# Patient Record
Sex: FEMALE | NOT HISPANIC OR LATINO | Employment: OTHER | URBAN - METROPOLITAN AREA
[De-identification: names, ages, dates, MRNs, and addresses within clinical notes are randomized per-mention and may not be internally consistent; named-entity substitution may affect disease eponyms.]

---

## 2018-08-21 ENCOUNTER — TELEPHONE (OUTPATIENT)
Dept: GASTROENTEROLOGY | Facility: CLINIC | Age: 73
End: 2018-08-21

## 2018-08-21 NOTE — TELEPHONE ENCOUNTER
PREVISIT INFORMATION                                                    Bobby KAILA Ignacio scheduled for future visit at [facility] specialty clinics.    Patient is scheduled to see Dr Anderson on 8/24/18  Reason for visit: stomach pain/burning   Referring provider N/A  Has patient seen previous specialist? No  Medical Records:  records in care ereverywhere     REVIEW                                                      New patient packet mailed to patient: No  Medication reconciliation complete: No      Current Outpatient Prescriptions   Medication Sig Dispense Refill     atorvastatin (LIPITOR) 20 MG tablet Take 1 tablet (20 mg) by mouth daily 30 tablet 12     Bismuth Subsalicylate (PEPTO-BISMOL PO) Take by mouth every morning       Cholecalciferol (VITAMIN D) 2000 UNITS CAPS Take 2,000 Units by mouth daily       levothyroxine (SYNTHROID, LEVOTHROID) 112 MCG tablet Take 1 tablet by mouth daily. 100 tablet 3     omeprazole (PRILOSEC) 20 MG capsule Take 1 capsule (20 mg) by mouth daily 30 capsule 12     sertraline (ZOLOFT) 50 MG tablet Take 1 tablet by mouth daily. 100 tablet 3       Allergies: Aspirin-butalbital-caffeine [butalbital-aspirin-caffeine]        PLAN/FOLLOW-UP NEEDED                                                      Previsit review complete.  Patient will see provider at future scheduled appointment. Spoke with patient about consult date, time, and location of visit. Patient stated she will call back with . Patient is also due to see GI   Through the [employer] system on 8/22/18. Will call back if visit with Dr Anderson is needed    Patient Reminders Given:  Please, make sure you bring an updated list of your medications.   If you are having a procedure, please, present 15 minutes early.  If you need to cancel or reschedule,please call [phone].    Man CHOWDHURY

## 2018-08-21 NOTE — TELEPHONE ENCOUNTER
Spoke with patient and  about visit with Dr Anderson. Patient states they will keep visit with Dr Anderson as of now and will call if visit needs to be canceled after seeing GI through [facility]     Man CHOWDHURY

## 2018-12-05 ENCOUNTER — PATIENT OUTREACH (OUTPATIENT)
Dept: CARE COORDINATION | Facility: CLINIC | Age: 73
End: 2018-12-05

## 2019-12-20 ENCOUNTER — TRANSCRIBE ORDERS (OUTPATIENT)
Dept: OTHER | Age: 74
End: 2019-12-20

## 2019-12-20 DIAGNOSIS — M79.2 NEUROPATHIC PAIN OF LOWER EXTREMITY, UNSPECIFIED LATERALITY: Primary | ICD-10-CM

## 2020-01-02 ENCOUNTER — TELEPHONE (OUTPATIENT)
Dept: NEUROLOGY | Facility: CLINIC | Age: 75
End: 2020-01-02

## 2020-01-02 NOTE — TELEPHONE ENCOUNTER
Health Call Center    Phone Message    May a detailed message be left on voicemail: yes    Reason for Call: Other: Pt referred by Precious Earl from [facility] for neuropathic pain of extremity.     Pt's  says that pt experiences pain from her knees to her foot. Pt's toes are also painful and there is a burning sensation. Pt is not taking any narcotics for this. Protocols says to send a msg for clinical team to review.    Please call Pt , Daren Ignacio back.  says they prefer appt in the afternoon.    Action Taken: Message routed to:  [facility] neuro

## 2020-01-03 NOTE — TELEPHONE ENCOUNTER
[facility] Call Center    Phone Message    May a detailed message be left on voicemail: yes    Reason for Call: Other: .  pt is calling back requesting a response to his original inquiry.    Action Taken: Message routed to:  [facility] neuro

## 2020-01-07 NOTE — TELEPHONE ENCOUNTER
I called pt  back and left a voicemail. I offered him the very soonest opening in general neuro January 24th at 10am with Dr. Whitfield. I held spot and sent to scheduling to make appointment. I asked pt call back to confirm this time will work.     Hali MTAOS

## 2020-01-09 ENCOUNTER — PRE VISIT (OUTPATIENT)
Dept: NEUROLOGY | Facility: CLINIC | Age: 75
End: 2020-01-09

## 2020-01-09 NOTE — TELEPHONE ENCOUNTER
FUTURE VISIT INFORMATION      FUTURE VISIT INFORMATION:    Date: 1/24/2020    Time: 10AM    Location: [facility]  REFERRAL INFORMATION:    Referring provider:  Dr. Earl     Referring providers clinic:  [facility]     Reason for visit/diagnosis  Neuropathic Pain of Lower Extremity     RECORDS REQUESTED FROM:       Clinic name Comments Records Status Imaging Status   [facility]  Care Everywhere Requested to PACS-MR Lumbar spine 5/1/2019

## 2020-04-10 NOTE — TELEPHONE ENCOUNTER
FUTURE VISIT INFORMATION      FUTURE VISIT INFORMATION:    Date: 7/14/20    Time: 2:20pm    Location: [facility] [place]  REFERRAL INFORMATION:    Referring provider:  Unknown    Referring providers clinic:  Unknown    Reason for visit/diagnosis  3 month follow up    RECORDS REQUESTED FROM:       Left message asking what patient is coming in for/ where records are  Clinic name Comments Records Status Imaging Status

## 2020-04-29 ENCOUNTER — TELEPHONE (OUTPATIENT)
Dept: INTERNAL MEDICINE | Facility: CLINIC | Age: 75
End: 2020-04-29

## 2020-04-29 NOTE — TELEPHONE ENCOUNTER
[facility] Call Center    Phone Message    May a detailed message be left on voicemail: yes     Reason for Call: Medication Question or concern regarding medication   Prescription Clarification  Name of Medication: something for dizziness  Prescribing Provider: Dr Wilkins   Pharmacy:      [id number] - [facility] [address] AT [facility] [address] [facility]     What on the order needs clarification? Daren pt  is calling to see if the pt can get something for her dizziness?             Action Taken: Message routed to:  [facility] primary care    Travel Screening: Not Applicable

## 2020-04-29 NOTE — TELEPHONE ENCOUNTER
Called patient's  Daren:  Cannot prescribe medication for patient's dizziness until she her appointment with Dr. Wilkins on 05/04/20.  Main reason: patient has not seen us primary care clinic or Dr. Wilkins since 2013.  Because of this, patient is no longer an establish patient of ours.  Advised to ED or urgent care if dizziness gets worse before appointment with Dr. Wilkins on 05/04/20.   gave verbal understanding.         Abad Powell CMA at 3:41 PM on 4/29/2020

## 2020-05-28 ENCOUNTER — VIRTUAL VISIT (OUTPATIENT)
Dept: NEUROLOGY | Facility: CLINIC | Age: 75
End: 2020-05-28
Payer: COMMERCIAL

## 2020-05-28 DIAGNOSIS — R69 DIAGNOSIS DEFERRED: Primary | ICD-10-CM

## 2020-05-28 NOTE — LETTER
5/28/2020     RE: Bobby Ignacio  [address] [address]     Dear Colleague,    Thank you for referring your patient, Bobby Ignacio, to the [facility] NEUROLOGY at [facility]. Please see a copy of my visit note below.    Not available today. Wants it tomorrow. To reschedule. [email or web address]    Again, thank you for allowing me to participate in the care of your patient.      Sincerely,    George Ramos MD

## 2020-06-12 ENCOUNTER — TELEPHONE (OUTPATIENT)
Dept: OPHTHALMOLOGY | Facility: CLINIC | Age: 75
End: 2020-06-12

## 2020-06-15 NOTE — TELEPHONE ENCOUNTER
FUTURE VISIT INFORMATION      FUTURE VISIT INFORMATION:    Date: 9/1/20    Time: 2:20pm    Location: [facility]  REFERRAL INFORMATION:    Referring provider:  Unknown    Referring providers clinic:  Unknown    Reason for visit/diagnosis  3 month follow up     RECORDS REQUESTED FROM:         Left message asking what patient is coming in for/ where records are

## 2020-07-14 ENCOUNTER — PRE VISIT (OUTPATIENT)
Dept: OPHTHALMOLOGY | Facility: CLINIC | Age: 75
End: 2020-07-14

## 2020-09-01 ENCOUNTER — PRE VISIT (OUTPATIENT)
Dept: OPHTHALMOLOGY | Facility: CLINIC | Age: 75
End: 2020-09-01

## 2020-09-01 ENCOUNTER — OFFICE VISIT (OUTPATIENT)
Dept: OPHTHALMOLOGY | Facility: CLINIC | Age: 75
End: 2020-09-01
Payer: COMMERCIAL

## 2020-09-01 DIAGNOSIS — H25.813 MIXED TYPE AGE-RELATED CATARACT, BOTH EYES: ICD-10-CM

## 2020-09-01 DIAGNOSIS — H52.4 PRESBYOPIA OF BOTH EYES: ICD-10-CM

## 2020-09-01 DIAGNOSIS — H43.813 PVD (POSTERIOR VITREOUS DETACHMENT), BILATERAL: ICD-10-CM

## 2020-09-01 DIAGNOSIS — H52.00 HYPERMETROPIA, UNSPECIFIED LATERALITY: Primary | ICD-10-CM

## 2020-09-01 DIAGNOSIS — H10.13 ALLERGIC CONJUNCTIVITIS OF BOTH EYES: ICD-10-CM

## 2020-09-01 ASSESSMENT — REFRACTION_MANIFEST
OD_CYLINDER: +0.50
OS_SPHERE: +0.25
OS_CYLINDER: +2.00
OS_AXIS: 173
OS_CYLINDER: +2.25
OD_ADD: +2.75
OD_CYLINDER: +0.50
OD_AXIS: 151
METHOD_AUTOREFRACTION: 1
OS_SPHERE: +0.75
OD_SPHERE: +0.50
OS_AXIS: 170
OD_SPHERE: +0.50
OD_AXIS: 150
OS_ADD: +2.75

## 2020-09-01 ASSESSMENT — EXTERNAL EXAM - RIGHT EYE: OD_EXAM: NORMAL

## 2020-09-01 ASSESSMENT — KERATOMETRY
OS_AXISANGLE2_DEGREES: 073
OS_K2POWER_DIOPTERS: 46.25
OS_K1POWER_DIOPTERS: 45.75
OD_K2POWER_DIOPTERS: 46.75
OS_AXISANGLE_DEGREES: 163
METHOD_AUTO_MANUAL: AUTOMATED
OD_AXISANGLE2_DEGREES: 070
OD_AXISANGLE_DEGREES: 160
OD_K1POWER_DIOPTERS: 46.00

## 2020-09-01 ASSESSMENT — SLIT LAMP EXAM - LIDS
COMMENTS: NORMAL
COMMENTS: NORMAL

## 2020-09-01 ASSESSMENT — CONF VISUAL FIELD
OS_NORMAL: 1
METHOD: COUNTING FINGERS
OD_NORMAL: 1

## 2020-09-01 ASSESSMENT — CUP TO DISC RATIO
OS_RATIO: 0.3
OD_RATIO: 0.3

## 2020-09-01 ASSESSMENT — VISUAL ACUITY
METHOD: SNELLEN - LINEAR
OS_SC: 20/70
OD_SC: 20/25
OD_SC+: +1

## 2020-09-01 ASSESSMENT — TONOMETRY
OS_IOP_MMHG: 14
OD_IOP_MMHG: 13
IOP_METHOD: ICARE

## 2020-09-01 ASSESSMENT — EXTERNAL EXAM - LEFT EYE: OS_EXAM: NORMAL

## 2020-09-01 NOTE — PROGRESS NOTES
"HPI  Bobby Ignacio is a 75 year old female here for comprehensive eye exam.  Patient notes that she wears glasses  for reading, she sees okay in the distance but would like to see signs better when driving. Patient uses an \"OTC drop\" unsure of the name, uses for relief of itching and tearing, uses in the spring time due to seasonal allergies.  She uses TID during spring season, not currently using now.       PMH:   Past Medical History:   Diagnosis Date     Chest pain 4/2014     Depression      Fatigue      Gastritis      Unspecified hypothyroidism     Hypothyroidism        POH: glasses , posterior vitreous detachment, no surgery, no trauma  Oc Meds: none  FH: Denies any glaucoma, age related macular degeneration, or other known eye diseases       Assessment & Plan      (H52.00) Hypermetropia, unspecified laterality - Both Eyes  (primary encounter diagnosis)  (H52.4) Presbyopia of both eyes - Both Eyes  Comment: change improves visual acuity   Plan: manifest refraction done and prescription for glasses given           (H25.813) Mixed type age-related cataract, both eyes - Both Eyes  Comment: becoming visually significant left eye > right eye   Plan: discussed with patient natural history of cataract and to call with worsening vision    (H43.813) PVD (posterior vitreous detachment), bilateral - Both Eyes  Comment: chronic per chart, no symptoms  No Bridger's sign, retinal tears, or detachment seen on exam today.  Plan:  Natural history of posterior vitreous detachment as well as retinal tear/detachment symptoms discussed with patient.  Told to call for prompt dilated exam if these symptoms occur.    (H10.13) Allergic conjunctivitis of both eyes - Both Eyes  Comment: mild currently, worse in spring by history  Plan: recommend ketotifen, patient not sure what she uses, call if symptoms do not improve  -----------------------------------------------------------------------------------       Patient disposition:   Return " in about 1 year (around 9/1/2021) for Comprehensive Exam. Patient to call sooner as needed.    Complete documentation of historical and exam elements from today's encounter can be found in the full encounter summary report (not reduplicated in this progress note). I personally obtained the chief complaint(s) and history of present illness.  I have confirmed and edited as necessary the CC, HPI, PMH/PSH, social history, FMH, ROS, and exam/neuro findings as obtained by the technician or others. I have examined this patient myself and I personally viewed the image(s) and studies listed above and the documentation reflects my findings and interpretation.     Ernestina Mckinney MD

## 2020-09-01 NOTE — NURSING NOTE
"Chief Complaints and History of Present Illnesses   Patient presents with     COMPREHENSIVE EYE EXAM     Chief Complaint(s) and History of Present Illness(es)     COMPREHENSIVE EYE EXAM     Laterality: both eyes    Quality: blurred vision    Context: distance vision and near vision    Associated symptoms: itching.  Negative for flashes, floaters, dryness, eye pain and tearing              Comments     Patient notes that she wears gls for reading, she sees okay in the distance but would like to see signs better when driving. Patient uses an \"OTC drop\" unsure of the name, uses for relief of itching, uses in the spring time due to seasonal allergies, uses TID during spring season, not currently using now.     Lacy Kline COT September 1, 2020 2:06 PM                  "

## 2020-10-05 ENCOUNTER — OFFICE VISIT (OUTPATIENT)
Dept: NEUROLOGY | Facility: CLINIC | Age: 75
End: 2020-10-05
Attending: INTERNAL MEDICINE
Payer: COMMERCIAL

## 2020-10-05 VITALS
RESPIRATION RATE: 18 BRPM | DIASTOLIC BLOOD PRESSURE: 73 MMHG | SYSTOLIC BLOOD PRESSURE: 118 MMHG | OXYGEN SATURATION: 95 % | HEART RATE: 94 BPM

## 2020-10-05 DIAGNOSIS — M79.605 PAIN IN BOTH LOWER EXTREMITIES: Primary | ICD-10-CM

## 2020-10-05 DIAGNOSIS — M79.604 PAIN IN BOTH LOWER EXTREMITIES: Primary | ICD-10-CM

## 2020-10-05 DIAGNOSIS — M79.2 NEUROPATHIC PAIN: ICD-10-CM

## 2020-10-05 PROCEDURE — 99204 OFFICE O/P NEW MOD 45 MIN: CPT | Performed by: PSYCHIATRY & NEUROLOGY

## 2020-10-05 RX ORDER — TRAZODONE HYDROCHLORIDE 50 MG/1
TABLET, FILM COATED ORAL
COMMUNITY
Start: 2020-09-18 | End: 2022-02-25

## 2020-10-05 RX ORDER — NORTRIPTYLINE HCL 10 MG
10 CAPSULE ORAL
COMMUNITY
Start: 2020-09-18 | End: 2020-10-05

## 2020-10-05 RX ORDER — GABAPENTIN 100 MG/1
CAPSULE ORAL
Qty: 90 CAPSULE | Refills: 2 | Status: SHIPPED | OUTPATIENT
Start: 2020-10-05 | End: 2020-10-21

## 2020-10-05 NOTE — LETTER
10/5/2020       RE: Bobby Ignacio  [address] [facility] [address]     Dear Colleague,    Thank you for referring your patient, Bobby Ignacio, to the [facility]OLOG[facility] [place] at [facility]. Please see a copy of my visit note below.    Service Date: 10/05/2020      HISTORY OF PRESENT ILLNESS:  Bobby Ignacio is a 75-year-old female being seen for evaluation of cramps, tingling and burning pain involving her lower extremities.      This started about 4 or 5 years ago.  Initially it was more of a tingling sensation, but now she has gone on to have burning pain in her feet.  She will get cramps.  She also has intermittent tingling of the right more than the left thigh.  Her symptoms are much worse at night, and this disrupts her sleep.  She denies any lower extremity weakness.  She does report a dry mouth.      She did have a lumbar MRI scan done through the [employer] system on 05/01/2019.  The study did not show any major changes.  There was mild left foraminal stenosis at L3-L4, mild bilateral foraminal narrowing at L4-L5 and a broad-based disk bulge at L5-S1 with moderate foraminal stenosis and some contact of the traversing S1 nerve roots.      She has had a number of laboratory studies done over the past year or so.  Her hemoglobin A1c has been elevated at 6.1.  She does have hypothyroidism and at one point her TSH was low with a normal free T4, but it was rechecked in September and the TSH was normal.  She has had normal liver functions, basic metabolic panel, CBC and sedimentation rate.  She has also had negative testing for hepatitis C.  Tissue transglutaminase was negative.      The patient was recently started on nortriptyline at 10 mg.  It has not really helped much.  She has not been on gabapentin that she can recall.      PAST MEDICAL HISTORY:  Notable for hyperlipidemia, hypothyroidism, depression and bilateral knee surgeries.       CURRENT MEDICATIONS:   1.  Atorvastatin.   2.  Vitamin D.   3.  Levothyroxine.   4.  Omeprazole.   5.  Sertraline 100 mg.   6.  Trazodone for sleep.   7.  Nortriptyline 10 mg.      ALLERGIES:  She does not have any true medical allergies.  It appears Fiorinal caused an upset stomach.      FAMILY HISTORY:  There is no family history of neurologic disorders that she is aware of.  There is no family history of neuropathy.      SOCIAL HISTORY:  She does not smoke or use alcohol.      PHYSICAL EXAMINATION:  Examination reveals a patient who is alert and cooperative.  Her heart rate is 95.  Blood pressure 118/73.  Pupils are equal, round and react well to light.  She has full extraocular motility.  Facial strength and sensation are normal.  Palate moves in the midline.  Tongue is normal.  Motor examination reveals normal upper and lower extremity strength.  Her sensory examination is intact to pin, cold, touch, position, and vibration.  There is no sensory loss to touch over the anterolateral thighs.  She ambulates with an antalgic gait.  Reflexes are 2+ in the upper extremities, trace at the knees and absent at the ankles.  Plantar responses are flexor.      IMPRESSION:  Chronic lower extremity paresthesias, dysesthesias and cramps.      I suspect this is a peripheral neuropathy, although her examination is really unrevealing in that regard.      PLAN:  For further evaluation, she is going to have EMG studies done.        I am also going to check Sjogren antibodies and serum immunofixation.      I discussed a trial of gabapentin for her pain, which is particularly problematic at night.  I cautioned her about exacerbation of depression on the drug, but we are going to start off with a low dose.  She will start at 100 mg at night for a week, then she can go to 200 mg for a week and then 300 mg and then hold at that dose until I see her back.      I advised her to discontinue the nortriptyline, as it probably will only  exacerbate her dry mouth.      I will be seeing her back in 8 weeks.     FINESSE GOLDBERG MD       cc:   Pete Wilkins MD    [facility]    [address]    [place]  [address]       Precious Earl MD   [facility]   [address] [place]  [address]      Carolyn Cary MD   [address]  [address]             D: 10/05/2020   T: 10/05/2020   MT: JUANIS      Name:     ELMO CRAIN   MRN:      [id number]        Account:      [id number]   :      1945           Service Date: 10/05/2020      Document: [id number]

## 2020-10-06 NOTE — PROGRESS NOTES
Service Date: 10/05/2020      HISTORY OF PRESENT ILLNESS:  Bobby Ignacio is a 75-year-old female being seen for evaluation of cramps, tingling and burning pain involving her lower extremities.      This started about 4 or 5 years ago.  Initially it was more of a tingling sensation, but now she has gone on to have burning pain in her feet.  She will get cramps.  She also has intermittent tingling of the right more than the left thigh.  Her symptoms are much worse at night, and this disrupts her sleep.  She denies any lower extremity weakness.  She does report a dry mouth.      She did have a lumbar MRI scan done through the [email or web address] system on 05/01/2019.  The study did not show any major changes.  There was mild left foraminal stenosis at L3-L4, mild bilateral foraminal narrowing at L4-L5 and a broad-based disk bulge at L5-S1 with moderate foraminal stenosis and some contact of the traversing S1 nerve roots.      She has had a number of laboratory studies done over the past year or so.  Her hemoglobin A1c has been elevated at 6.1.  She does have hypothyroidism and at one point her TSH was low with a normal free T4, but it was rechecked in September and the TSH was normal.  She has had normal liver functions, basic metabolic panel, CBC and sedimentation rate.  She has also had negative testing for hepatitis C.  Tissue transglutaminase was negative.      The patient was recently started on nortriptyline at 10 mg.  It has not really helped much.  She has not been on gabapentin that she can recall.      PAST MEDICAL HISTORY:  Notable for hyperlipidemia, hypothyroidism, depression and bilateral knee surgeries.      CURRENT MEDICATIONS:   1.  Atorvastatin.   2.  Vitamin D.   3.  Levothyroxine.   4.  Omeprazole.   5.  Sertraline 100 mg.   6.  Trazodone for sleep.   7.  Nortriptyline 10 mg.      ALLERGIES:  She does not have any true medical allergies.  It appears Fiorinal caused an upset stomach.      FAMILY HISTORY:   There is no family history of neurologic disorders that she is aware of.  There is no family history of neuropathy.      SOCIAL HISTORY:  She does not smoke or use alcohol.      PHYSICAL EXAMINATION:  Examination reveals a patient who is alert and cooperative.  Her heart rate is 95.  Blood pressure 118/73.  Pupils are equal, round and react well to light.  She has full extraocular motility.  Facial strength and sensation are normal.  Palate moves in the midline.  Tongue is normal.  Motor examination reveals normal upper and lower extremity strength.  Her sensory examination is intact to pin, cold, touch, position, and vibration.  There is no sensory loss to touch over the anterolateral thighs.  She ambulates with an antalgic gait.  Reflexes are 2+ in the upper extremities, trace at the knees and absent at the ankles.  Plantar responses are flexor.      IMPRESSION:  Chronic lower extremity paresthesias, dysesthesias and cramps.      I suspect this is a peripheral neuropathy, although her examination is really unrevealing in that regard.      PLAN:  For further evaluation, she is going to have EMG studies done.        I am also going to check Sjogren antibodies and serum immunofixation.      I discussed a trial of gabapentin for her pain, which is particularly problematic at night.  I cautioned her about exacerbation of depression on the drug, but we are going to start off with a low dose.  She will start at 100 mg at night for a week, then she can go to 200 mg for a week and then 300 mg and then hold at that dose until I see her back.      I advised her to discontinue the nortriptyline, as it probably will only exacerbate her dry mouth.      I will be seeing her back in 8 weeks.     ADDENDUM 10/21/20: EMG negative for large fiber neuropathy but dis show changes of Chronic bilateral S1 radiculopathy. She did not have SIEP or Sjogren antibodies done. She did not try Gabapentin as went to wrong pharmacy. Spoke to  . MRI done in May 2019 suggested possible bilateral S1 compression. Will get follow up lumbar MRI in view of EMG findings. She will have blood work done. Rx for Gabapentin sent to her pharmacy. She has follow up with me on  and reminded her  of this.     20: Patient unable to keep appointment today. Discussed Lumbar MRI results with . Moderate degenerative changes at L4-5 with disc bulging and abutment on L5 and S1 nerve roots. Not severe. She became depressed with Gabapentin. Discussed trail of low dose Duloxetine (20 mg/day). Discussed potential interaction with sertraline and advised stopping duloxetine if any significant side effects such as confusion, tremor. Reminded to have IEP and Sjogren ABs checked. Still wonder about neuropathy based on symptoms. ?mall fiber.  will arrange follow up with me in a few weeks.       cc:   Pete Wilkins MD    [facility]    [address]    [place]  [address]       Precious Earl MD   [facility]   [address] [id number]      Carolyn Cary MD   [facility]   [address] [place]  [address]         FINESSE GOLDBERG MD             D: 10/05/2020   T: 10/05/2020   MT: JUANIS      Name:     ELMO CRAIN   MRN:      [id number]        Account:      [id number]   :      1945           Service Date: 10/05/2020      Document: [id number]

## 2020-10-12 ENCOUNTER — OFFICE VISIT (OUTPATIENT)
Dept: NEUROLOGY | Facility: CLINIC | Age: 75
End: 2020-10-12
Payer: COMMERCIAL

## 2020-10-12 DIAGNOSIS — M79.672 PAIN IN BOTH FEET: ICD-10-CM

## 2020-10-12 DIAGNOSIS — M54.16 LUMBAR RADICULOPATHY: Primary | ICD-10-CM

## 2020-10-12 DIAGNOSIS — M79.671 PAIN IN BOTH FEET: ICD-10-CM

## 2020-10-12 PROCEDURE — 95886 MUSC TEST DONE W/N TEST COMP: CPT | Performed by: PSYCHIATRY & NEUROLOGY

## 2020-10-12 PROCEDURE — 95910 NRV CNDJ TEST 7-8 STUDIES: CPT | Performed by: PSYCHIATRY & NEUROLOGY

## 2020-10-12 PROCEDURE — 95885 MUSC TST DONE W/NERV TST LIM: CPT | Mod: 59 | Performed by: PSYCHIATRY & NEUROLOGY

## 2020-10-12 NOTE — LETTER
10/12/2020       RE: Bobby Ignacio  [address] [address]     Dear Colleague,    Thank you for referring your patient, Bobby Ignacio, to the [facility] EMG [facility] [place] at [facility]. Please see a copy of my visit note below.        [facility]  Electrodiagnostic Laboratory    Nerve Conduction & EMG Report          Patient: Bobby Ignacio YOB: 1945  Patient ID: [id number] Age: 75 Years 4 Months  Gender: Female      History & Examination:  75 year old woman with burning in both feet. She also reports low back pain. Evaluate for polyneuropathy vs radiuclopathy.     Techniques:  Sensory and motor conduction studies were done with surface recording electrodes. EMG was done with a concentric needle electrode.     Results:  Nerve conduction studies:   1. Bilateral sural and superficial peroneal sensory responses are normal.   2. Bilateral peroneal-EDB and tibial-AH motor responses are normal.     Needle EM. Fibrillation potentials and positive sharp waves were seen in the bilateral gastrocnemius muscles.   2. Large amplitude and/or long duration motor unit potentials (MUP) were seen in the bilateral gastrocnemius, right PL, and right glut med muscles.   3. Recruitment patterns are normal.     Interpretation:  This is an abnormal study. There is electrophysiologic evidence of a chronic right-sided S1 radiculopathy. Limited needle EMG of left-sided muscles was suggestive of a similar process affecting that side as well (i.e., bilateral chronic S1 radiulcopathies). There is no evidence of a large-fiber polyneuropathy affecting the lower limbs on the basis of this study.       Garry Cheung MD  Department of Neurology      Sensory NCS      Nerve / Sites Rec. Site Onset Peak NP Amp Ref. PP Amp Dist Antwan Ref. Temp     ms ms  V  V  V cm m/s m/s  C   R SURAL - Lat Mall 60      Calf Ankle 2.92 3.80 11.3 5.0 16.1 14 48.0 38.0  31.5   L SURAL - Lat Mall 60      Calf Ankle 2.92 3.80 14.7 5.0 18.3 14 48.0 38.0 31.8   L SUP PERONEAL      Lat Leg Castillo 2.34 3.23 5.2  3.5 12.5 53.3 38.0 31.8   R SUP PERONEAL      Lat Leg Castillo 2.66 3.23 5.4  8.1 12.5 47.1 38.0 31.8       Motor NCS      Nerve / Sites Rec. Site Lat Ref. Amp Ref. Rel Amp Dist Antwan Ref. Dur. Area Temp.     ms ms mV mV % cm m/s m/s ms %  C   R DEEP PERONEAL - EDB 60      Ankle EDB 3.96 6.00 4.0 2.0 100 8   6.88 100 31.8      FibHead EDB 10.00  3.6  89.4 27 44.7 38.0 7.14 89.9 31.8      Pop Fos EDB 11.77  3.6  89.8 10 56.5 38.0 7.03 449 31.8   L DEEP PERONEAL - EDB 60      Ankle EDB 3.96 6.00 2.6 2.0 100 8   5.57 100 24.7   R TIBIAL - AH      Ankle AH 3.54 6.00 13.1 4.0 100 8   5.68 100 31.8      Pop Fos AH 11.82  10.3  78.7 32 38.6 38.0 6.56 99 31.8   L TIBIAL - AH      Ankle AH 3.85 6.00 8.8 4.0 100 8   5.89 100 31.8       EMG Summary Table     Spontaneous MUAP Recruitment    IA Fib/PSW Fasc H.F. Amp Dur. PPP Pattern   R. TIB ANTERIOR N None None None N N None Normal   R. GASTROCN (MED) Increased 1+ None None 1+ N None Normal   R. PERON LONGUS N None None None N 1+ 1+ Normal   R. VAST LATERALIS N None None None N N None Normal   R. GLUTEUS MED N None None None N N 2+ Normal   R. LUMB PSP (L) N None None None       L. TIB ANTERIOR N None None None N N None Normal   L. GASTROCN (MED) Increased 1+ None None 1+ N 1+ Normal                                Again, thank you for allowing me to participate in the care of your patient.      Sincerely,    Garry Cheung MD

## 2020-10-12 NOTE — LETTER
10/12/2020       RE: Bobby Ignacio  [address] [facility] [id number]     Dear Colleague,    Thank you for referring your patient, Bobby Ignacio, to the [facility] EMG CLINIC [place] at [facility]. Please see a copy of my visit note below.    Nerve Conduction & EMG Report          Patient: Bobby Ignacio YOB: 1945  Patient ID: [id number] Age: 75 Years 4 Months  Gender: Female      History & Examination:  75 year old woman with burning in both feet. She also reports low back pain. Evaluate for polyneuropathy vs radiuclopathy.     Techniques:  Sensory and motor conduction studies were done with surface recording electrodes. EMG was done with a concentric needle electrode.     Results:  Nerve conduction studies:   1. Bilateral sural and superficial peroneal sensory responses are normal.   2. Bilateral peroneal-EDB and tibial-AH motor responses are normal.     Needle EM. Fibrillation potentials and positive sharp waves were seen in the bilateral gastrocnemius muscles.   2. Large amplitude and/or long duration motor unit potentials (MUP) were seen in the bilateral gastrocnemius, right PL, and right glut med muscles.   3. Recruitment patterns are normal.     Interpretation:  This is an abnormal study. There is electrophysiologic evidence of a chronic right-sided S1 radiculopathy. Limited needle EMG of left-sided muscles was suggestive of a similar process affecting that side as well (i.e., bilateral chronic S1 radiulcopathies). There is no evidence of a large-fiber polyneuropathy affecting the lower limbs on the basis of this study.       Garry Cheung MD  Department of Neurology      Sensory NCS      Nerve / Sites Rec. Site Onset Peak NP Amp Ref. PP Amp Dist Antwan Ref. Temp     ms ms  V  V  V cm m/s m/s  C   R SURAL - Lat Mall 60      Calf Ankle 2.92 3.80 11.3 5.0 16.1 14 48.0 38.0 31.5   L SURAL - Lat Mall 60      Calf Ankle 2.92 3.80 14.7  5.0 18.3 14 48.0 38.0 31.8   L SUP PERONEAL      Lat Leg Castillo 2.34 3.23 5.2  3.5 12.5 53.3 38.0 31.8   R SUP PERONEAL      Lat Leg Castillo 2.66 3.23 5.4  8.1 12.5 47.1 38.0 31.8       Motor NCS      Nerve / Sites Rec. Site Lat Ref. Amp Ref. Rel Amp Dist Antwan Ref. Dur. Area Temp.     ms ms mV mV % cm m/s m/s ms %  C   R DEEP PERONEAL - EDB 60      Ankle EDB 3.96 6.00 4.0 2.0 100 8   6.88 100 31.8      FibHead EDB 10.00  3.6  89.4 27 44.7 38.0 7.14 89.9 31.8      Pop Fos EDB 11.77  3.6  89.8 10 56.5 38.0 7.03 449 31.8   L DEEP PERONEAL - EDB 60      Ankle EDB 3.96 6.00 2.6 2.0 100 8   5.57 100 24.7   R TIBIAL - AH      Ankle AH 3.54 6.00 13.1 4.0 100 8   5.68 100 31.8      Pop Fos AH 11.82  10.3  78.7 32 38.6 38.0 6.56 99 31.8   L TIBIAL - AH      Ankle AH 3.85 6.00 8.8 4.0 100 8   5.89 100 31.8           EMG Summary Table     Spontaneous MUAP Recruitment    IA Fib/PSW Fasc H.F. Amp Dur. PPP Pattern   R. TIB ANTERIOR N None None None N N None Normal   R. GASTROCN (MED) Increased 1+ None None 1+ N None Normal   R. PERON LONGUS N None None None N 1+ 1+ Normal   R. VAST LATERALIS N None None None N N None Normal   R. GLUTEUS MED N None None None N N 2+ Normal   R. LUMB PSP (L) N None None None       L. TIB ANTERIOR N None None None N N None Normal   L. GASTROCN (MED) Increased 1+ None None 1+ N 1+ Normal                         Again, thank you for allowing me to participate in the care of your patient.  Sincerely,    Garry Cheung MD

## 2020-10-12 NOTE — PROGRESS NOTES
[facility]  Electrodiagnostic Laboratory    Nerve Conduction & EMG Report          Patient: Bobby Ignacio YOB: 1945  Patient ID: [id number] Age: 75 Years 4 Months  Gender: Female      History & Examination:  75 year old woman with burning in both feet. She also reports low back pain. Evaluate for polyneuropathy vs radiuclopathy.     Techniques:  Sensory and motor conduction studies were done with surface recording electrodes. EMG was done with a concentric needle electrode.     Results:  Nerve conduction studies:   1. Bilateral sural and superficial peroneal sensory responses are normal.   2. Bilateral peroneal-EDB and tibial-AH motor responses are normal.     Needle EM. Fibrillation potentials and positive sharp waves were seen in the bilateral gastrocnemius muscles.   2. Large amplitude and/or long duration motor unit potentials (MUP) were seen in the bilateral gastrocnemius, right PL, and right glut med muscles.   3. Recruitment patterns are normal.     Interpretation:  This is an abnormal study. There is electrophysiologic evidence of a chronic right-sided S1 radiculopathy. Limited needle EMG of left-sided muscles was suggestive of a similar process affecting that side as well (i.e., bilateral chronic S1 radiulcopathies). There is no evidence of a large-fiber polyneuropathy affecting the lower limbs on the basis of this study.       Garry Cheung MD  Department of Neurology      Sensory NCS      Nerve / Sites Rec. Site Onset Peak NP Amp Ref. PP Amp Dist Antwan Ref. Temp     ms ms  V  V  V cm m/s m/s  C   R SURAL - Lat Mall 60      Calf Ankle 2.92 3.80 11.3 5.0 16.1 14 48.0 38.0 31.5   L SURAL - Lat Mall 60      Calf Ankle 2.92 3.80 14.7 5.0 18.3 14 48.0 38.0 31.8   L SUP PERONEAL      Lat Leg Castillo 2.34 3.23 5.2  3.5 12.5 53.3 38.0 31.8   R SUP PERONEAL      Lat Leg Castillo 2.66 3.23 5.4  8.1 12.5 47.1 38.0 31.8       Motor NCS      Nerve / Sites Rec. Site Lat Ref. Amp Ref. Rel Amp Dist  Antwan Ref. Dur. Area Temp.     ms ms mV mV % cm m/s m/s ms %  C   R DEEP PERONEAL - EDB 60      Ankle EDB 3.96 6.00 4.0 2.0 100 8   6.88 100 31.8      FibHead EDB 10.00  3.6  89.4 27 44.7 38.0 7.14 89.9 31.8      Pop Fos EDB 11.77  3.6  89.8 10 56.5 38.0 7.03 449 31.8   L DEEP PERONEAL - EDB 60      Ankle EDB 3.96 6.00 2.6 2.0 100 8   5.57 100 24.7   R TIBIAL - AH      Ankle AH 3.54 6.00 13.1 4.0 100 8   5.68 100 31.8      Pop Fos AH 11.82  10.3  78.7 32 38.6 38.0 6.56 99 31.8   L TIBIAL - AH      Ankle AH 3.85 6.00 8.8 4.0 100 8   5.89 100 31.8       EMG Summary Table     Spontaneous MUAP Recruitment    IA Fib/PSW Fasc H.F. Amp Dur. PPP Pattern   R. TIB ANTERIOR N None None None N N None Normal   R. GASTROCN (MED) Increased 1+ None None 1+ N None Normal   R. PERON LONGUS N None None None N 1+ 1+ Normal   R. VAST LATERALIS N None None None N N None Normal   R. GLUTEUS MED N None None None N N 2+ Normal   R. LUMB PSP (L) N None None None       L. TIB ANTERIOR N None None None N N None Normal   L. GASTROCN (MED) Increased 1+ None None 1+ N 1+ Normal

## 2020-10-20 ENCOUNTER — TELEPHONE (OUTPATIENT)
Dept: NEUROLOGY | Facility: CLINIC | Age: 75
End: 2020-10-20

## 2020-10-20 NOTE — TELEPHONE ENCOUNTER
[facility] Call Center    Phone Message    May a detailed message be left on voicemail: yes     Reason for Call: Other: Spouse is calling on behalf of patient to discuss the results of EMG and next steps for patient.     Spouse is wondering if patient would be prescribed medication.    Patient will be available when clinic call spouse- back to discuss best time to call anytime in the afternoon    Patient had an EMG done on 10/12/20 with Dr. Cheung-         Action Taken: Other:  NEUROLOGY    Travel Screening: Not Applicable

## 2020-10-21 DIAGNOSIS — M79.605 PAIN IN BOTH LOWER EXTREMITIES: ICD-10-CM

## 2020-10-21 DIAGNOSIS — M79.604 PAIN IN BOTH LOWER EXTREMITIES: ICD-10-CM

## 2020-10-21 RX ORDER — GABAPENTIN 100 MG/1
CAPSULE ORAL
Qty: 90 CAPSULE | Refills: 2 | Status: SHIPPED | OUTPATIENT
Start: 2020-10-21 | End: 2020-11-30 | Stop reason: SINTOL

## 2020-11-27 ENCOUNTER — ANCILLARY PROCEDURE (OUTPATIENT)
Dept: MRI IMAGING | Facility: CLINIC | Age: 75
End: 2020-11-27
Attending: PSYCHIATRY & NEUROLOGY
Payer: COMMERCIAL

## 2020-11-27 DIAGNOSIS — M79.604 PAIN IN BOTH LOWER EXTREMITIES: ICD-10-CM

## 2020-11-27 DIAGNOSIS — M79.605 PAIN IN BOTH LOWER EXTREMITIES: ICD-10-CM

## 2020-11-27 PROCEDURE — 72148 MRI LUMBAR SPINE W/O DYE: CPT | Mod: GC | Performed by: STUDENT IN AN ORGANIZED HEALTH CARE EDUCATION/TRAINING PROGRAM

## 2020-11-30 DIAGNOSIS — M79.605 PAIN IN BOTH LOWER EXTREMITIES: ICD-10-CM

## 2020-11-30 DIAGNOSIS — M54.16 LUMBAR RADICULOPATHY: Primary | ICD-10-CM

## 2020-11-30 DIAGNOSIS — M79.604 PAIN IN BOTH LOWER EXTREMITIES: ICD-10-CM

## 2020-11-30 RX ORDER — DULOXETIN HYDROCHLORIDE 20 MG/1
20 CAPSULE, DELAYED RELEASE ORAL DAILY
Qty: 30 CAPSULE | Refills: 2 | Status: SHIPPED | OUTPATIENT
Start: 2020-11-30 | End: 2022-03-14

## 2020-11-30 RX ORDER — DULOXETIN HYDROCHLORIDE 20 MG/1
20 CAPSULE, DELAYED RELEASE ORAL 2 TIMES DAILY
Qty: 30 CAPSULE | Refills: 2 | Status: SHIPPED | OUTPATIENT
Start: 2020-11-30 | End: 2020-11-30

## 2021-04-08 NOTE — TELEPHONE ENCOUNTER
REFERRAL INFORMATION:    Referring Provider:  N/A    Referring Clinic:  N/A    Reason for Visit/Diagnosis: Abdominal pain, Bloating, Problem after eating     FUTURE VISIT INFORMATION:    Appointment Date: 6/22/2021    Appointment Time: 2:40 PM      NOTES STATUS DETAILS   OFFICE NOTE from Referring Provider N/A    OFFICE NOTE from Other Specialist Care Everywhere/ internal 4/29/2021 Office visit with Dr. Danielle Romo ([facility])     11/20/2020, 8/10/18 Office visit with Dr. Carolyn Cary ([facility])     8/22/18 Office visit with LINNEA Iniguez CNP ( [facility])     8/20/18 Office visit with Dr. Taj Burdick ([facility])    HOSPITAL DISCHARGE SUMMARY/  ED VISITS Care Everywhere    OPERATIVE REPORT N/A    MEDICATION LIST Internal         ENDOSCOPY  N/A    COLONOSCOPY N/A    ERCP N/A    EUS N/A    STOOL TESTING N/A    PERTINENT LABS Care Everywhere/ Internal     PATHOLOGY REPORTS (RELATED) N/A    IMAGING (CT, MRI, EGD, MRCP, Small Bowel Follow Through/SBT, MR/CT Enterography) N/A

## 2021-04-29 ENCOUNTER — VIRTUAL VISIT (OUTPATIENT)
Dept: INTERNAL MEDICINE | Facility: CLINIC | Age: 76
End: 2021-04-29
Payer: COMMERCIAL

## 2021-04-29 DIAGNOSIS — K31.9 STOMACH PROBLEMS: Primary | ICD-10-CM

## 2021-04-29 PROCEDURE — 99203 OFFICE O/P NEW LOW 30 MIN: CPT | Mod: 95 | Performed by: INTERNAL MEDICINE

## 2021-04-29 RX ORDER — PREDNISONE 20 MG/1
20 TABLET ORAL
COMMUNITY
Start: 2021-03-25 | End: 2022-02-25

## 2021-04-29 RX ORDER — LEVOTHYROXINE SODIUM 125 UG/1
125 TABLET ORAL DAILY
COMMUNITY
Start: 2021-02-05

## 2021-04-29 RX ORDER — SERTRALINE HYDROCHLORIDE 100 MG/1
100 TABLET, FILM COATED ORAL DAILY
COMMUNITY
Start: 2021-02-04

## 2021-04-29 RX ORDER — PANTOPRAZOLE SODIUM 40 MG/1
40 TABLET, DELAYED RELEASE ORAL DAILY
COMMUNITY
Start: 2021-02-04 | End: 2022-03-14

## 2021-04-29 NOTE — PROGRESS NOTES
Pre charting:  This effort is essential to preparing for upcoming service directly affecting ongoing primary care management and coordination of care for this patient for the same day office visit.  Person performing pre charting work:  Dr. Romo  This non face-to-face clinical work included review/documentation of medical history, notes and test results outside our system (e.g.. CareEverywhere, paper copies), reviewing which specialists are also following the patient, updating problem list, reviewing medications, need for refills, vital sign trends,  flow sheets such as PHQ-9 and ORAL-7 questionnaires, hospital discharge summaries, routine health care maintenance, specialist notes, laboratory, procedures, imaging, etc.    Date of pre charting: Today  Time: 10:53-11:06 a.m.  Total time:  15 min

## 2021-04-29 NOTE — PROGRESS NOTES
"This patient is being evaluated via a billable telephone visit; THIS VISIT WAS INITIATED BY THE PT, as AN ALTERNATIVE TO IN PERSON VISIT .       The patient has has been notified of following:      \"This billable telephone visit will be conducted via a call between you and your physician/provider. We have found that certain health care needs can be provided without the need for a physical exam.  This service lets us provide the care you need with a short phone conversation.  If a prescription is necessary we can send it directly to your pharmacy.  If lab work is needed we can place an order for that and you can then stop by our lab to have the test done at a later time. We can also place orders for a limited number of imaging tests if they are deemed urgently necessary.     If during the course of the call the physician/provider feels a telephone visit is not appropriate, you will not be charged for this service.\"     Due to efforts to reduce the spread of COVID-19 in the clinic, state, nation, telephone visits are encouraged currently. Patient understands that diagnose and advice is limited by the inability to exam him/her/them face-to-face.    Patient has given verbal consent for the virtual audio visit? Yes  Did patient initiate this virtual visit? Yes    Person spoken to: patient    This was a synchronous virtual visit  Time call initiated:  1:05 pm  Time call ended:  1:34 pm  Total length of call: 29 min    Danielle Romo M.D.  Internal Medicine  [facility]       Patients: if you have questions or concerns about this progress note, please discuss them with the provider at a future office visit.      "

## 2021-04-29 NOTE — PATIENT INSTRUCTIONS
[facility] Medication Refill Request Information:  * Please contact your pharmacy regarding ANY request for medication refills.  ** [facility] Prescription Fax = [phone]  * Please allow 3 business days for routine medication refills.  * Please allow 5 business days for controlled substance medication refills.     [facility] Test Result notification information:  *You will be notified with in 7-10 days of your appointment day regarding the results of your test.  If you are on MyChart you will be notified as soon as the provider has reviewed the results and signed off on them.    [facility] [phone]

## 2021-04-29 NOTE — PROGRESS NOTES
"CC: stomach problems    Other health care team members:  PCP Dr. Wilkins  GI:  Dr. Kwon  Neurology:  Dr. Whitfield  Ophthalmology:  Dr. Mckinney  Ortho:  Dr. Barrientos/outside clinic    HPI:  75 year old female  History may be affected by language barrier.  Patient provides history, with 's assistance.  Patient reports \"problem with stomach\", \"burning, gasses, loose motion\", dry mouth, hiccuping.  Pepto Bismol and Imodium help, but not completely.  Duration \"long time\", intermittent, variable intensity, this bout has been going on for about one month.  Intensity, unable to say. Aggravated by--unable to say. States had tests to \"look into stomach\" about 10 years ago, one in [place], one time here.  States was normal. Reviewed GI consultation from [facility].  Has not changed her diet. Eats \"typical  foods\", pork, chicken, goat. Bananas, oranges. Does cook with onions.      Was seen by [facility], reviewed note from 8/22/18.  IBS suspected.  Discussed nature of IBS, FODMAP diet, probiotics, Imodium, dietary/supplemental fiber, antispasmotics, avoid caffeine, encouraged exercise, and stress management.  Advised fiber up to 1 TBS BID.  Bentyl prn cramping.  Small, frequent meals, drink water, exercise.  Continue omeprazole. RTC one year.    I do not see any EGD/colonoscopy or surg path reports in CareEverywhere.    PMHx:  Hearing loss, S/N  S/p left TKA  DJD, hip, low back , neck, knee  LBP with right S1 radiculopathy, possibly left as well (see EMG/NCS 10/12/20)  Dyslipidemia  Hypothyroidism  Foot burning sensation/neuropathy  LE cramping  LE varicose veins  Anxiety, depression  GERD  Osteopenia  Insomnia  IBS  Chronic abdominal pain, bloating  Impaired fasting glucose  Dizziness, unclear etiology    Current Outpatient Medications   Medication Sig Dispense Refill     atorvastatin (LIPITOR) 20 MG tablet Take 1 tablet (20 mg) by mouth daily 30 tablet 12     Bismuth Subsalicylate (PEPTO-BISMOL PO) Take by " mouth every morning       Cholecalciferol (VITAMIN D) 2000 UNITS CAPS Take 2,000 Units by mouth daily       DULoxetine (CYMBALTA) 20 MG capsule Take 1 capsule (20 mg) by mouth daily 30 capsule 2     levothyroxine (SYNTHROID, LEVOTHROID) 112 MCG tablet Take 1 tablet by mouth daily. (Patient taking differently: Take 0.125 mcg by mouth daily ) 100 tablet 3     levothyroxine (SYNTHROID/LEVOTHROID) 125 MCG tablet Take 125 mcg by mouth daily       omeprazole (PRILOSEC) 20 MG capsule Take 1 capsule (20 mg) by mouth daily 30 capsule 12     pantoprazole (PROTONIX) 40 MG EC tablet Take 40 mg by mouth daily       predniSONE (DELTASONE) 20 MG tablet Take 20 mg by mouth       sertraline (ZOLOFT) 100 MG tablet Take 100 mg by mouth daily       sertraline (ZOLOFT) 50 MG tablet Take 1 tablet by mouth daily. (Patient taking differently: Take 100 mg by mouth daily ) 100 tablet 3     traZODone (DESYREL) 50 MG tablet TK 1 TO 2 TS PO HS PRN       Allergies   Allergen Reactions     Aspirin-Butalbital-Caffeine [Butalbital-Aspirin-Caffeine]      sensatitive stomach     Gabapentin Other (See Comments)     Depression     BP Readings from Last 6 Encounters:   10/05/20 118/73   05/05/14 128/63   03/27/13 128/74   04/19/12 134/69   04/05/12 141/85     Wt Readings from Last 5 Encounters:   05/05/14 71.4 kg (157 lb 8 oz)   03/27/13 70.7 kg (155 lb 12.8 oz)   04/19/12 71.4 kg (157 lb 6.5 oz)   04/05/12 72.4 kg (159 lb 9.8 oz)     Estimated body mass index is 30.76 kg/m  as calculated from the following:    Height as of 5/5/14: 1.524 m (5').    Weight as of 5/5/14: 71.4 kg (157 lb 8 oz).  General:  Alert, breathing comfortably, actively engaged in conversation.    Bobby was seen today for gastrointestinal problem and medication follow-up.    Diagnoses and all orders for this visit:    Stomach problems  -     GASTROENTEROLOGY ADULT REF CONSULT ONLY; Future    In person.    See additional details of today's visit in the history of present illness  section.    F/U with PCP as needed.    Danielle Romo M.D.  Internal Medicine  [facility]     Patients: if you have questions or concerns about this progress note, please discuss them with the provider at a future office visit.

## 2021-04-29 NOTE — NURSING NOTE
Chief Complaint   Patient presents with     Gastrointestinal Problem     Pt is havng abdominal pain, burning sensation, gas. Pt is possibly dehydration per patients . pt also has a dry mouth.      Medication Follow-up     Video Visit Technology for this patient: No video technology available to patient, please call patient over the phone     Sonam Lazo LPN at 1:02 PM on 4/29/2021.

## 2021-06-22 ENCOUNTER — PRE VISIT (OUTPATIENT)
Dept: GASTROENTEROLOGY | Facility: CLINIC | Age: 76
End: 2021-06-22

## 2021-09-19 ENCOUNTER — TRANSFERRED RECORDS (OUTPATIENT)
Dept: HEALTH INFORMATION MANAGEMENT | Facility: CLINIC | Age: 76
End: 2021-09-19

## 2021-09-22 PROBLEM — M54.2 NECK PAIN, CHRONIC: Status: ACTIVE | Noted: 2018-01-06

## 2021-09-22 PROBLEM — R11.0 NAUSEA: Status: ACTIVE | Noted: 2018-01-08

## 2021-09-22 PROBLEM — R73.03 PREDIABETES: Status: ACTIVE | Noted: 2018-01-06

## 2021-09-22 PROBLEM — H90.3 SENSORINEURAL HEARING LOSS OF BOTH EARS: Status: ACTIVE | Noted: 2018-08-25

## 2021-09-22 PROBLEM — G89.29 NECK PAIN, CHRONIC: Status: ACTIVE | Noted: 2018-01-06

## 2021-09-22 PROBLEM — R20.8 BURNING SENSATION OF FEET: Status: ACTIVE | Noted: 2017-02-23

## 2021-09-22 PROBLEM — R73.01 IFG (IMPAIRED FASTING GLUCOSE): Status: ACTIVE | Noted: 2018-01-06

## 2021-09-22 PROBLEM — I83.90 VARICOSE VEINS OF LOWER EXTREMITY: Status: ACTIVE | Noted: 2017-02-23

## 2021-09-22 PROBLEM — R25.2 CRAMPS OF LOWER EXTREMITY: Status: ACTIVE | Noted: 2017-02-23

## 2021-09-22 PROBLEM — M19.90 DJD (DEGENERATIVE JOINT DISEASE): Status: ACTIVE | Noted: 2018-01-06

## 2021-09-22 PROBLEM — K59.00 CONSTIPATION: Status: ACTIVE | Noted: 2018-01-09

## 2021-09-22 PROBLEM — R10.9 CHRONIC ABDOMINAL PAIN: Status: ACTIVE | Noted: 2020-09-20

## 2021-09-22 PROBLEM — Z96.652 STATUS POST TOTAL LEFT KNEE REPLACEMENT: Status: ACTIVE | Noted: 2018-01-07

## 2021-09-22 PROBLEM — E78.5 HYPERLIPEMIA: Status: ACTIVE | Noted: 2018-01-06

## 2021-09-22 PROBLEM — G89.29 CHRONIC ABDOMINAL PAIN: Status: ACTIVE | Noted: 2020-09-20

## 2021-09-22 RX ORDER — ATORVASTATIN CALCIUM 10 MG/1
10 TABLET, FILM COATED ORAL AT BEDTIME
COMMUNITY
Start: 2021-05-27 | End: 2022-03-14

## 2021-09-23 NOTE — TELEPHONE ENCOUNTER
Action 9/23/21 CJ   Action Taken Requested EKGs 9-19-21 x2 (showing Afib) from [facility]    EKG in Epic

## 2021-09-30 ENCOUNTER — OFFICE VISIT (OUTPATIENT)
Dept: CARDIOLOGY | Facility: CLINIC | Age: 76
End: 2021-09-30
Attending: INTERNAL MEDICINE
Payer: COMMERCIAL

## 2021-09-30 ENCOUNTER — PRE VISIT (OUTPATIENT)
Dept: CARDIOLOGY | Facility: CLINIC | Age: 76
End: 2021-09-30

## 2021-09-30 VITALS
HEIGHT: 60 IN | HEART RATE: 112 BPM | DIASTOLIC BLOOD PRESSURE: 83 MMHG | BODY MASS INDEX: 29.25 KG/M2 | WEIGHT: 149 LBS | OXYGEN SATURATION: 94 % | SYSTOLIC BLOOD PRESSURE: 126 MMHG

## 2021-09-30 DIAGNOSIS — I48.19 PERSISTENT ATRIAL FIBRILLATION (H): Primary | ICD-10-CM

## 2021-09-30 PROCEDURE — 99204 OFFICE O/P NEW MOD 45 MIN: CPT | Mod: GC | Performed by: INTERNAL MEDICINE

## 2021-09-30 PROCEDURE — G0463 HOSPITAL OUTPT CLINIC VISIT: HCPCS | Mod: 25

## 2021-09-30 PROCEDURE — 93005 ELECTROCARDIOGRAM TRACING: CPT

## 2021-09-30 RX ORDER — POTASSIUM CHLORIDE 1500 MG/1
40 TABLET, EXTENDED RELEASE ORAL
Status: CANCELLED | OUTPATIENT
Start: 2021-09-30

## 2021-09-30 RX ORDER — LIDOCAINE 40 MG/G
CREAM TOPICAL
Status: CANCELLED | OUTPATIENT
Start: 2021-09-30

## 2021-09-30 RX ORDER — MAGNESIUM SULFATE HEPTAHYDRATE 40 MG/ML
2 INJECTION, SOLUTION INTRAVENOUS
Status: CANCELLED | OUTPATIENT
Start: 2021-09-30

## 2021-09-30 RX ORDER — METOPROLOL SUCCINATE 25 MG/1
25 TABLET, EXTENDED RELEASE ORAL AT BEDTIME
Qty: 30 TABLET | Refills: 4 | Status: SHIPPED | OUTPATIENT
Start: 2021-09-30

## 2021-09-30 RX ORDER — POTASSIUM CHLORIDE 1500 MG/1
20 TABLET, EXTENDED RELEASE ORAL
Status: CANCELLED | OUTPATIENT
Start: 2021-09-30

## 2021-09-30 ASSESSMENT — MIFFLIN-ST. JEOR: SCORE: 1081.74

## 2021-09-30 ASSESSMENT — PAIN SCALES - GENERAL: PAINLEVEL: NO PAIN (0)

## 2021-09-30 NOTE — LETTER
9/30/2021      RE: Bobby Ignacio  [address] [facility] [place] [address]       Dear Colleague,    Thank you for the opportunity to participate in the care of your patient, Bobby Ignacio, at the [facility] [place] [facility]. Please see a copy of my visit note below.    September 30, 2021    [facility]     Bobby Ignacio is a 76 year old female with a hx of hypothyroid, HLD who is presenting for evaluation after a new diagnosis of atrial fibrillation.     11 days ago she presented to an outside emergency department with chest pain and left shoulder pain.  In the ED her troponin was negative x2 and her chest pain was thought to be noncardiac.  Her EKG during this ED visit was notable for rate controlled atrial fibrillation.  She is started on apixaban and discharged home with a cardiology referral.  Since her ED visit she does endorse no further episodes of chest pain.  She denies any palpitations or rapid heart rate.  She denies any lightheadedness dizziness or passing out.  She does have chronic shortness of breath which limits her exercise capacity.  She says she can only walk up 1-2 flights of stairs or walk 1-2 blocks before stopping to rest.  Denies any orthopnea or PND.  Denies any snoring, apnea or early morning headaches.  No weight changes she has no history of cardiac disease, but does have a significant family history for MI.    PAST MEDICAL HISTORY:  Past Medical History:   Diagnosis Date     Chest pain 4/2014     Depression      Fatigue      Gastritis      Unspecified hypothyroidism     Hypothyroidism       FAMILY HISTORY:  Family History   Problem Relation Age of Onset     Cardiovascular Father         CHF     C.A.D. Father      Cardiovascular Brother         CHF     C.A.D. Brother      Cardiovascular Brother      Cardiovascular Brother      Glaucoma No family hx of      Macular Degeneration No  family hx of        SOCIAL HISTORY:  Social History     Socioeconomic History     Marital status:      Spouse name: Not on file     Number of children: Not on file     Years of education: Not on file     Highest education level: Not on file   Occupational History     Not on file   Tobacco Use     Smoking status: Never Smoker     Smokeless tobacco: Never Used   Substance and Sexual Activity     Alcohol use: No     Drug use: No     Sexual activity: Yes     Partners: Male   Other Topics Concern      Service Not Asked     Blood Transfusions Not Asked     Caffeine Concern Not Asked     Occupational Exposure Not Asked     Hobby Hazards Not Asked     Sleep Concern Not Asked     Stress Concern Not Asked     Weight Concern Not Asked     Special Diet Not Asked     Back Care Not Asked     Exercise Yes     Bike Helmet Not Asked     Seat Belt Not Asked     Self-Exams Not Asked     Parent/sibling w/ CABG, MI or angioplasty before 65F 55M? Not Asked   Social History Narrative     Not on file     Social Determinants of Health     Financial Resource Strain:      Difficulty of Paying Living Expenses:    Food Insecurity:      Worried About Running Out of Food in the Last Year:      Ran Out of Food in the Last Year:    Transportation Needs:      Lack of Transportation (Medical):      Lack of Transportation (Non-Medical):    Physical Activity:      Days of Exercise per Week:      Minutes of Exercise per Session:    Stress:      Feeling of Stress :    Social Connections:      Frequency of Communication with Friends and Family:      Frequency of Social Gatherings with Friends and Family:      Attends Zoroastrianism Services:      Active Member of Clubs or Organizations:      Attends Club or Organization Meetings:      Marital Status:    Intimate Partner Violence:      Fear of Current or Ex-Partner:      Emotionally Abused:      Physically Abused:      Sexually Abused:        CURRENT MEDICATIONS:  Current Outpatient Medications    Medication Sig Dispense Refill     apixaban ANTICOAGULANT (ELIQUIS) 5 MG tablet Take 5 mg by mouth       atorvastatin (LIPITOR) 10 MG tablet Take 10 mg by mouth At Bedtime       atorvastatin (LIPITOR) 20 MG tablet Take 1 tablet (20 mg) by mouth daily 30 tablet 12     Bismuth Subsalicylate (PEPTO-BISMOL PO) Take by mouth every morning       Cholecalciferol (VITAMIN D) 2000 UNITS CAPS Take 2,000 Units by mouth daily       DULoxetine (CYMBALTA) 20 MG capsule Take 1 capsule (20 mg) by mouth daily 30 capsule 2     levothyroxine (SYNTHROID, LEVOTHROID) 112 MCG tablet Take 1 tablet by mouth daily. (Patient taking differently: Take 0.125 mcg by mouth daily ) 100 tablet 3     levothyroxine (SYNTHROID/LEVOTHROID) 125 MCG tablet Take 125 mcg by mouth daily       omeprazole (PRILOSEC) 20 MG capsule Take 1 capsule (20 mg) by mouth daily 30 capsule 12     pantoprazole (PROTONIX) 40 MG EC tablet Take 40 mg by mouth daily       predniSONE (DELTASONE) 20 MG tablet Take 20 mg by mouth       sertraline (ZOLOFT) 100 MG tablet Take 100 mg by mouth daily       traZODone (DESYREL) 50 MG tablet TK 1 TO 2 TS PO HS PRN         ROS:   10 point ROS negative except HPI    EXAM:  There were no vitals taken for this visit.  General: appears comfortable, alert and articulate  Head: normocephalic, atraumatic  Eyes: anicteric sclera, EOMI  Neck: no adenopathy  Orophyarynx: moist mucosa, no lesions, dentition intact  Heart: Irregularly irregular, tachycardic, no murmur, gallop, rub, estimated JVP 6cmH20  Lungs: clear, no rales or wheezing  Abdomen: soft, non-tender, bowel sounds present, no hepatosplenomegaly  Extremities: no clubbing, cyanosis or edema  Neurological: normal speech and affect, no gross motor deficits    Labs:  No results for input(s): WBC, HGB, HCT, PLT in the last 71496 hours.  No results for input(s): NA, POTASSIUM, CHLORIDE, CO2, BUN, CR, GFRESTIMATED in the last 05498 hours.    Invalid input(s): GLUC  No results for input(s):  CHOL, HDL, LDL, TRIG, CHOLHDLRATIO in the last 83036 hours.    ECG:  Atrial fibrillation with RVR, RBBB    Assessment and Plan: Bobby Ignacio is a 76 year old female with a past medical history of HLD, Hypothyroidism who presents for evaluation of new atrial fibrillation.     #Persistent Atrial Fibrillation:  #[id number] Score 3 (Female, age)  Noted to be in atrial fibrillation in her ED visit on 9/19. Continues to be in atrial fibrillation today with rates 110-120. She is asymptomatic so unclear when she developed A-fib. Notably she was tachycardic during a clinic visit in Aug. Unclear what prompted atrial fibrillation, ddx includes inappropriate synthroid dose (TSH 0.09 recently). Low suspicion for structural heart disease or LUCI. She has been taking her medications as instructed.   - Start Metoprolol Succinate 25mg at bedtime  - Family Practitioner adjusting her synthroid dose   - Apixaban co-pay is currently too expensive, she will look into the cost of Pradax and Xarelto and follow up (still has 3 weeks of apixiban remaining so she will continue to take it)  - Will plan to schedule a cardioversion and TTE in late October     This patient was seen and discussed with Dr. Holly Matthews MD  Cardiology Fellow  [phone]      Attestation signed by Sherie Barrera MD at 9/30/2021 10:15 PM (Updated):  Patient seen and examined with Dr. Matthews. The history and physical findings are accurate as recorded.   Briefly, 77 yo presented with chest pain, noncardiac, but was incidentally discovered to have atrial fibrillation. Apixaban 5 mg bid started.  TSH 0.09. PCP reduced levothyroxine from 125 to 88 mcg yesterday - she had not yet picked up new script.    Review of available records: no prior EKGs; office visit at PCP 8/10/2021: ; 11/20/2020: HR 85.    All labs, imaging studies, ECG and telemetry data have been reviewed personally. Specifically,   EKGs: 9/19/2021: AF 91 bpm, RBBB; today:  bpm  Labs  9/19/2021: TSH 0.09, free T4 1.47, cr 1.03 (CCL 58), hgb 12, plt 165K  Nuclear stress test 7/23/2015: sinus rhythm, RBBB, no ischemia    The assessment and plans outlined reflect our joint decision making.  AF, unknown duration, probably since at least August 2021. Asymptomatic.  On appropriate dose of apixaban 5 bid, however insurance does not cover all cost so it is very expensive for her. Alternatives are Pradaxa 150 bid or Xarelto 20 every day - I wrote these down for her so she can check with insurance company. I would add Metoprolol succinate 25 at bedtime, and plan for elective cardioversion after she has been on DOAC without interruption for at least 3 weeks.    Ms. Ignacio's son is an ENT in [place] - I spoke with him on phone at her request.    In total, we spent 40 minutes face to face with Bobby Ignacio during today's office visit. I have spent an additional 20 minutes today on chart review and documentation.    I will see her back 1 month post cardioversion and will get 2D echo at that time.    Sherie Barrera MD  Cardiology

## 2021-09-30 NOTE — NURSING NOTE
START Metoprolol 25 mg daily    Needs to change anticoagulation d/t costs, pt will update us with what medication insurance covers    Plan for cardioversion at the end of October.    Follow up with Dr. Barrera 1 month post cardioversion with echo prior.     Jeffrey Vega, RN   Cardiology Nurse Coordinator

## 2021-09-30 NOTE — PROGRESS NOTES
September 30, 2021    [facility]     Bobby Ignacio is a 76 year old female with a hx of hypothyroid, HLD who is presenting for evaluation after a new diagnosis of atrial fibrillation.     11 days ago she presented to an outside emergency department with chest pain and left shoulder pain.  In the ED her troponin was negative x2 and her chest pain was thought to be noncardiac.  Her EKG during this ED visit was notable for rate controlled atrial fibrillation.  She is started on apixaban and discharged home with a cardiology referral.  Since her ED visit she does endorse no further episodes of chest pain.  She denies any palpitations or rapid heart rate.  She denies any lightheadedness dizziness or passing out.  She does have chronic shortness of breath which limits her exercise capacity.  She says she can only walk up 1-2 flights of stairs or walk 1-2 blocks before stopping to rest.  Denies any orthopnea or PND.  Denies any snoring, apnea or early morning headaches.  No weight changes she has no history of cardiac disease, but does have a significant family history for MI.    PAST MEDICAL HISTORY:  Past Medical History:   Diagnosis Date     Chest pain 4/2014     Depression      Fatigue      Gastritis      Unspecified hypothyroidism     Hypothyroidism       FAMILY HISTORY:  Family History   Problem Relation Age of Onset     Cardiovascular Father         CHF     C.A.D. Father      Cardiovascular Brother         CHF     C.A.D. Brother      Cardiovascular Brother      Cardiovascular Brother      Glaucoma No family hx of      Macular Degeneration No family hx of        SOCIAL HISTORY:  Social History     Socioeconomic History     Marital status:      Spouse name: Not on file     Number of children: Not on file     Years of education: Not on file     Highest education level: Not on file   Occupational History     Not on file   Tobacco Use     Smoking status: Never Smoker     Smokeless  tobacco: Never Used   Substance and Sexual Activity     Alcohol use: No     Drug use: No     Sexual activity: Yes     Partners: Male   Other Topics Concern      Service Not Asked     Blood Transfusions Not Asked     Caffeine Concern Not Asked     Occupational Exposure Not Asked     Hobby Hazards Not Asked     Sleep Concern Not Asked     Stress Concern Not Asked     Weight Concern Not Asked     Special Diet Not Asked     Back Care Not Asked     Exercise Yes     Bike Helmet Not Asked     Seat Belt Not Asked     Self-Exams Not Asked     Parent/sibling w/ CABG, MI or angioplasty before 65F 55M? Not Asked   Social History Narrative     Not on file     Social Determinants of Health     Financial Resource Strain:      Difficulty of Paying Living Expenses:    Food Insecurity:      Worried About Running Out of Food in the Last Year:      Ran Out of Food in the Last Year:    Transportation Needs:      Lack of Transportation (Medical):      Lack of Transportation (Non-Medical):    Physical Activity:      Days of Exercise per Week:      Minutes of Exercise per Session:    Stress:      Feeling of Stress :    Social Connections:      Frequency of Communication with Friends and Family:      Frequency of Social Gatherings with Friends and Family:      Attends Church Services:      Active Member of Clubs or Organizations:      Attends Club or Organization Meetings:      Marital Status:    Intimate Partner Violence:      Fear of Current or Ex-Partner:      Emotionally Abused:      Physically Abused:      Sexually Abused:        CURRENT MEDICATIONS:  Current Outpatient Medications   Medication Sig Dispense Refill     apixaban ANTICOAGULANT (ELIQUIS) 5 MG tablet Take 5 mg by mouth       atorvastatin (LIPITOR) 10 MG tablet Take 10 mg by mouth At Bedtime       atorvastatin (LIPITOR) 20 MG tablet Take 1 tablet (20 mg) by mouth daily 30 tablet 12     Bismuth Subsalicylate (PEPTO-BISMOL PO) Take by mouth every morning        Cholecalciferol (VITAMIN D) 2000 UNITS CAPS Take 2,000 Units by mouth daily       DULoxetine (CYMBALTA) 20 MG capsule Take 1 capsule (20 mg) by mouth daily 30 capsule 2     levothyroxine (SYNTHROID, LEVOTHROID) 112 MCG tablet Take 1 tablet by mouth daily. (Patient taking differently: Take 0.125 mcg by mouth daily ) 100 tablet 3     levothyroxine (SYNTHROID/LEVOTHROID) 125 MCG tablet Take 125 mcg by mouth daily       omeprazole (PRILOSEC) 20 MG capsule Take 1 capsule (20 mg) by mouth daily 30 capsule 12     pantoprazole (PROTONIX) 40 MG EC tablet Take 40 mg by mouth daily       predniSONE (DELTASONE) 20 MG tablet Take 20 mg by mouth       sertraline (ZOLOFT) 100 MG tablet Take 100 mg by mouth daily       traZODone (DESYREL) 50 MG tablet TK 1 TO 2 TS PO HS PRN         ROS:   10 point ROS negative except HPI    EXAM:  There were no vitals taken for this visit.  General: appears comfortable, alert and articulate  Head: normocephalic, atraumatic  Eyes: anicteric sclera, EOMI  Neck: no adenopathy  Orophyarynx: moist mucosa, no lesions, dentition intact  Heart: Irregularly irregular, tachycardic, no murmur, gallop, rub, estimated JVP 6cmH20  Lungs: clear, no rales or wheezing  Abdomen: soft, non-tender, bowel sounds present, no hepatosplenomegaly  Extremities: no clubbing, cyanosis or edema  Neurological: normal speech and affect, no gross motor deficits    Labs:  No results for input(s): WBC, HGB, HCT, PLT in the last 79302 hours.  No results for input(s): NA, POTASSIUM, CHLORIDE, CO2, BUN, CR, GFRESTIMATED in the last 28158 hours.    Invalid input(s): GLUC  No results for input(s): CHOL, HDL, LDL, TRIG, CHOLHDLRATIO in the last 21460 hours.    ECG:  Atrial fibrillation with RVR, RBBB    Assessment and Plan: Bobby Ignacio is a 76 year old female with a past medical history of HLD, Hypothyroidism who presents for evaluation of new atrial fibrillation.     #Persistent Atrial Fibrillation:  #[id number] Score 3 (Female,  age)  Noted to be in atrial fibrillation in her ED visit on 9/19. Continues to be in atrial fibrillation today with rates 110-120. She is asymptomatic so unclear when she developed A-fib. Notably she was tachycardic during a clinic visit in Aug. Unclear what prompted atrial fibrillation, ddx includes inappropriate synthroid dose (TSH 0.09 recently). Low suspicion for structural heart disease or LUCI. She has been taking her medications as instructed.   - Start Metoprolol Succinate 25mg at bedtime  - Family Practitioner adjusting her synthroid dose   - Apixaban co-pay is currently too expensive, she will look into the cost of Pradax and Xarelto and follow up (still has 3 weeks of apixiban remaining so she will continue to take it)  - Will plan to schedule a cardioversion and TTE in late October     This patient was seen and discussed with Dr. Holly Matthews MD  Cardiology Fellow  [phone]

## 2021-09-30 NOTE — PATIENT INSTRUCTIONS
"You were seen today in the Cardiovascular Clinic at the [facility].     Cardiology Providers you saw during your visit: Dr. Sherie Barrera    Diagnosis: atrial fibrillation    Results: discussed with patient    Orders:   None    Medication Changes:   Add Metoprolol succinate 25 mg at bedtime    Options for Eliquis:  Pradaxa 150 twice a day or Xarelto 20 mg once a day    Recommendations:   Outpatient cardioversion late October    Follow-up:   4 weeks after cardioversion  Please follow up with primary care provider for medication refills         Please feel free to call me with any questions or concerns.       Lay Fajardo RN     Questions and [phone].   First press #1 for the [employer] and then press #3 for \"Medical Questions\" to reach us Cardiology Nurses.      On Call Cardiologist for after hours or on weekends: [phone]   option #4 and ask to speak to the on-call Cardiologist.          If you need a medication refill please contact your pharmacy.  Please allow 3 business days for your refill to be completed.    "

## 2021-10-01 LAB
ATRIAL RATE - MUSE: 87 BPM
DIASTOLIC BLOOD PRESSURE - MUSE: NORMAL MMHG
INTERPRETATION ECG - MUSE: NORMAL
P AXIS - MUSE: NORMAL DEGREES
PR INTERVAL - MUSE: NORMAL MS
QRS DURATION - MUSE: 126 MS
QT - MUSE: 356 MS
QTC - MUSE: 485 MS
R AXIS - MUSE: 6 DEGREES
SYSTOLIC BLOOD PRESSURE - MUSE: NORMAL MMHG
T AXIS - MUSE: -16 DEGREES
VENTRICULAR RATE- MUSE: 112 BPM

## 2021-10-05 ENCOUNTER — TELEPHONE (OUTPATIENT)
Dept: CARDIOLOGY | Facility: CLINIC | Age: 76
End: 2021-10-05

## 2021-10-05 NOTE — TELEPHONE ENCOUNTER
EP Scheduling called the patient to schedule Cardioversion. The number [phone] was left for the patient to return the call and schedule the procedure.    Nancy Duque  Periop Electrophysiology   [phone]

## 2021-10-05 NOTE — TELEPHONE ENCOUNTER
Attempted to call pt to discuss which blood thinner her insurance will cover to get that ordered since she only has a few weeks left of Eliquis.     Pt did not answer but left VM requesting she call us back to discuss.     Jeffrey Vega, RN   Cardiology Nurse Coordinator

## 2021-10-07 NOTE — TELEPHONE ENCOUNTER
Another attempt was made to reach the patient and schedule her procedure. The phone number to EP scheduling was left again.     Nancy Duque  Periop Electrophysiology   [phone]

## 2021-10-11 ENCOUNTER — OFFICE VISIT (OUTPATIENT)
Dept: OPHTHALMOLOGY | Facility: CLINIC | Age: 76
End: 2021-10-11
Payer: COMMERCIAL

## 2021-10-11 ENCOUNTER — TELEPHONE (OUTPATIENT)
Dept: OPHTHALMOLOGY | Facility: CLINIC | Age: 76
End: 2021-10-11

## 2021-10-11 DIAGNOSIS — H01.00A BLEPHARITIS OF UPPER AND LOWER EYELIDS OF BOTH EYES, UNSPECIFIED TYPE: ICD-10-CM

## 2021-10-11 DIAGNOSIS — H02.9 LESION OF LEFT UPPER EYELID: ICD-10-CM

## 2021-10-11 DIAGNOSIS — H10.13 ALLERGIC CONJUNCTIVITIS OF BOTH EYES: Primary | ICD-10-CM

## 2021-10-11 DIAGNOSIS — H01.00B BLEPHARITIS OF UPPER AND LOWER EYELIDS OF BOTH EYES, UNSPECIFIED TYPE: ICD-10-CM

## 2021-10-11 PROCEDURE — 92012 INTRM OPH EXAM EST PATIENT: CPT | Performed by: OPHTHALMOLOGY

## 2021-10-11 ASSESSMENT — TONOMETRY
IOP_METHOD: ICARE
OD_IOP_MMHG: 10
OS_IOP_MMHG: 10

## 2021-10-11 ASSESSMENT — VISUAL ACUITY
CORRECTION_TYPE: GLASSES
OS_PH_CC: 20/25
OS_CC: 20/40
OD_CC: 20/25
OS_CC+: -1
OS_PH_CC+: -3
OD_CC+: -1
METHOD: SNELLEN - LINEAR

## 2021-10-11 ASSESSMENT — REFRACTION_WEARINGRX
OD_SPHERE: +0.50
OD_CYLINDER: +0.50
OD_AXIS: 150
OS_AXIS: 170
OS_SPHERE: +0.75
SPECS_TYPE: LAST MRX IN PHOROPTER
OS_ADD: +2.75
OD_ADD: +2.75
OS_CYLINDER: +2.00

## 2021-10-11 ASSESSMENT — EXTERNAL EXAM - RIGHT EYE: OD_EXAM: NORMAL

## 2021-10-11 ASSESSMENT — CONF VISUAL FIELD
OS_NORMAL: 1
METHOD: COUNTING FINGERS
OD_NORMAL: 1

## 2021-10-11 ASSESSMENT — SLIT LAMP EXAM - LIDS: COMMENTS: MGD MILD

## 2021-10-11 ASSESSMENT — EXTERNAL EXAM - LEFT EYE: OS_EXAM: NORMAL

## 2021-10-11 NOTE — TELEPHONE ENCOUNTER
Spoke with patient regarding scheduling a Return in about 8 days (around 10/19/2021) for Follow Up-blepharitis, allergic conjunctivitis, needs CEE (deferred last time). Ok to overbook next 1-2 weeks on a Tuesday. Scheduled patient accordingly and sent AVS Printout to confirmed address.-Per Patient

## 2021-10-11 NOTE — NURSING NOTE
Chief Complaints and History of Present Illnesses   Patient presents with     Eye Problem Left Eye     Chief Complaint(s) and History of Present Illness(es)     Eye Problem Left Eye     Laterality: left eye    Onset: 3 days ago    Associated symptoms: photophobia, swelling, discharge, itching and foreign body sensation (constant per pt).  Negative for eye pain    Pain scale: 0/10              Comments     Pt notes that she has been having tearing and pain, has been ongoing for a few days, has been photophobic, denies redness. Pt is using Always every day OU. She notes that when the pain is present it feels tender, comes and goes  Lacy Kline COT October 11, 2021 10:24 AM

## 2021-10-11 NOTE — PROGRESS NOTES
HPI  Bobby Ignacio is a 76 year old female here for swelling of left eyelid and itching left eye with light sensitivity.  Started 3 days ago.  No known exposures to chemicals, dust, debris.  Using alaway eye drops twice a day not helping.  Pimple like lesion on the outer corner left eye for about a month. Lid tenderness comes and goes.  Distance vision not totally clear but hasn't changed recently.     PMH:   Past Medical History:   Diagnosis Date     Chest pain 4/2014     Depression      Fatigue      Gastritis      Unspecified hypothyroidism     Hypothyroidism        POH: hypermetropia/presbyopia glasses , posterior vitreous detachment, no surgery, no trauma, Mixed type age-related cataract, posterior vitreous detachment both eyes   Oc Meds: alaway twice a day both eyes   FH: Denies any glaucoma, age related macular degeneration, or other known eye diseases       Assessment & Plan      (H10.13) Allergic conjunctivitis of both eyes - Both Eyes  (primary encounter diagnosis)  Comment: signs of eye rubbing, unknown environmental exposure  Plan: frequent flushing with artificial tear drops   Continue antihistamine- can try oral since some nasal symptoms   Wipe lids before bed    (H01.00A,  H01.00B) Blepharitis of upper and lower eyelids of both eyes, unspecified type  Comment: secondary   Plan:   Warm compresses 1-2 times daily   Lid scrubs morning and evening: use diluted baby shampoo on warm washcloth  or cotton-tipped applicator to clean lid margins carefully from side to side   Artificial tears 4-6 times per day as needed for discomfort    (H02.9) Lesion of left upper eyelid - Left Eye  Comment: insect bite versus old chalazion  Doesn't look activity inflammed and not close to margin  Plan: observe for now    Due for annual exam but will wait for that until above resolved, deferred refraction today  -----------------------------------------------------------------------------------       Patient disposition:    Return in about 8 days (around 10/19/2021) for Follow Up-blepharitis, allergic conjunctivitis,  needs CEE (deferred last time). Patient to call sooner as needed.    Complete documentation of historical and exam elements from today's encounter can be found in the full encounter summary report (not reduplicated in this progress note). I personally obtained the chief complaint(s) and history of present illness.  I have confirmed and edited as necessary the CC, HPI, PMH/PSH, social history, FMH, ROS, and exam/neuro findings as obtained by the technician or others. I have examined this patient myself and I personally viewed the image(s) and studies listed above and the documentation reflects my findings and interpretation.     Ernestina Mckinney MD

## 2021-10-12 NOTE — TELEPHONE ENCOUNTER
Another message was left in an attempt to schedule the patient for her cardioversion.     Nancy Duque  Periop Electrophysiology   [phone]

## 2021-12-16 DIAGNOSIS — I48.19 PERSISTENT ATRIAL FIBRILLATION (H): ICD-10-CM

## 2021-12-20 RX ORDER — METOPROLOL SUCCINATE 25 MG/1
25 TABLET, EXTENDED RELEASE ORAL AT BEDTIME
Qty: 90 TABLET | OUTPATIENT
Start: 2021-12-20

## 2022-01-26 NOTE — TELEPHONE ENCOUNTER
REFERRAL INFORMATION:    Referring Provider:  Dr. Danielle Romo     Referring Clinic:  [facility]     Reason for Visit/Diagnosis: Stomach problems      FUTURE VISIT INFORMATION:    Appointment Date: 5/4/2022    Appointment Time: 3 PM      NOTES STATUS DETAILS   OFFICE NOTE from Referring Provider Internal 4/29/2021 Office visit with Dr. Romo      OFFICE NOTE from Other Specialist Internal  2/25/2022 Office visit with Dr. Pete Wilkins ([facility])      HOSPITAL DISCHARGE SUMMARY/  ED VISITS N/A    OPERATIVE REPORT N/A    MEDICATION LIST Internal         ENDOSCOPY  N/A    COLONOSCOPY N/A    ERCP N/A    EUS N/A    STOOL TESTING N/A    PERTINENT LABS Internal/ Care Everywhere    PATHOLOGY REPORTS (RELATED) N/A    IMAGING (CT, MRI, EGD, MRCP, Small Bowel Follow Through/SBT, MR/CT Enterography) Internal  US Abdomen: 2/25/2022

## 2022-02-25 ENCOUNTER — OFFICE VISIT (OUTPATIENT)
Dept: INTERNAL MEDICINE | Facility: CLINIC | Age: 77
End: 2022-02-25
Payer: COMMERCIAL

## 2022-02-25 ENCOUNTER — ANCILLARY PROCEDURE (OUTPATIENT)
Dept: ULTRASOUND IMAGING | Facility: CLINIC | Age: 77
End: 2022-02-25
Attending: INTERNAL MEDICINE
Payer: COMMERCIAL

## 2022-02-25 ENCOUNTER — LAB (OUTPATIENT)
Dept: LAB | Facility: CLINIC | Age: 77
End: 2022-02-25
Payer: COMMERCIAL

## 2022-02-25 VITALS
HEIGHT: 60 IN | WEIGHT: 147 LBS | HEART RATE: 97 BPM | OXYGEN SATURATION: 94 % | SYSTOLIC BLOOD PRESSURE: 144 MMHG | BODY MASS INDEX: 28.86 KG/M2 | DIASTOLIC BLOOD PRESSURE: 90 MMHG | RESPIRATION RATE: 16 BRPM

## 2022-02-25 DIAGNOSIS — R10.84 ABDOMINAL PAIN, GENERALIZED: ICD-10-CM

## 2022-02-25 DIAGNOSIS — E55.9 VITAMIN D DEFICIENCY: ICD-10-CM

## 2022-02-25 DIAGNOSIS — E78.5 HYPERLIPIDEMIA, UNSPECIFIED HYPERLIPIDEMIA TYPE: ICD-10-CM

## 2022-02-25 DIAGNOSIS — E03.9 HYPOTHYROIDISM, UNSPECIFIED TYPE: ICD-10-CM

## 2022-02-25 DIAGNOSIS — R19.5 LOOSE STOOLS: Primary | ICD-10-CM

## 2022-02-25 LAB
ALBUMIN SERPL-MCNC: 3.9 G/DL (ref 3.4–5)
ALBUMIN UR-MCNC: NEGATIVE MG/DL
ALP SERPL-CCNC: 100 U/L (ref 40–150)
ALT SERPL W P-5'-P-CCNC: 26 U/L (ref 0–50)
ANION GAP SERPL CALCULATED.3IONS-SCNC: 4 MMOL/L (ref 3–14)
APPEARANCE UR: CLEAR
AST SERPL W P-5'-P-CCNC: 29 U/L (ref 0–45)
BASOPHILS # BLD AUTO: 0 10E3/UL (ref 0–0.2)
BASOPHILS NFR BLD AUTO: 1 %
BILIRUB SERPL-MCNC: 0.7 MG/DL (ref 0.2–1.3)
BILIRUB UR QL STRIP: NEGATIVE
BUN SERPL-MCNC: 17 MG/DL (ref 7–30)
CALCIUM SERPL-MCNC: 9.3 MG/DL (ref 8.5–10.1)
CHLORIDE BLD-SCNC: 105 MMOL/L (ref 94–109)
CHOLEST SERPL-MCNC: 130 MG/DL
CO2 SERPL-SCNC: 30 MMOL/L (ref 20–32)
COLOR UR AUTO: ABNORMAL
CREAT SERPL-MCNC: 0.75 MG/DL (ref 0.52–1.04)
CRP SERPL-MCNC: <2.9 MG/L (ref 0–8)
DEPRECATED CALCIDIOL+CALCIFEROL SERPL-MC: 66 UG/L (ref 20–75)
EOSINOPHIL # BLD AUTO: 0.3 10E3/UL (ref 0–0.7)
EOSINOPHIL NFR BLD AUTO: 4 %
ERYTHROCYTE [DISTWIDTH] IN BLOOD BY AUTOMATED COUNT: 13.4 % (ref 10–15)
ERYTHROCYTE [SEDIMENTATION RATE] IN BLOOD BY WESTERGREN METHOD: 17 MM/HR (ref 0–30)
FASTING STATUS PATIENT QL REPORTED: NO
GFR SERPL CREATININE-BSD FRML MDRD: 82 ML/MIN/1.73M2
GLUCOSE BLD-MCNC: 96 MG/DL (ref 70–99)
GLUCOSE UR STRIP-MCNC: NEGATIVE MG/DL
HCT VFR BLD AUTO: 41.3 % (ref 35–47)
HDLC SERPL-MCNC: 50 MG/DL
HGB BLD-MCNC: 13.4 G/DL (ref 11.7–15.7)
HGB UR QL STRIP: NEGATIVE
IMM GRANULOCYTES # BLD: 0 10E3/UL
IMM GRANULOCYTES NFR BLD: 0 %
KETONES UR STRIP-MCNC: NEGATIVE MG/DL
LDLC SERPL CALC-MCNC: 60 MG/DL
LEUKOCYTE ESTERASE UR QL STRIP: NEGATIVE
LIPASE SERPL-CCNC: 187 U/L (ref 73–393)
LYMPHOCYTES # BLD AUTO: 1.6 10E3/UL (ref 0.8–5.3)
LYMPHOCYTES NFR BLD AUTO: 23 %
MCH RBC QN AUTO: 28.3 PG (ref 26.5–33)
MCHC RBC AUTO-ENTMCNC: 32.4 G/DL (ref 31.5–36.5)
MCV RBC AUTO: 87 FL (ref 78–100)
MONOCYTES # BLD AUTO: 0.6 10E3/UL (ref 0–1.3)
MONOCYTES NFR BLD AUTO: 9 %
MUCOUS THREADS #/AREA URNS LPF: PRESENT /LPF
NEUTROPHILS # BLD AUTO: 4.1 10E3/UL (ref 1.6–8.3)
NEUTROPHILS NFR BLD AUTO: 63 %
NITRATE UR QL: NEGATIVE
NONHDLC SERPL-MCNC: 80 MG/DL
NRBC # BLD AUTO: 0 10E3/UL
NRBC BLD AUTO-RTO: 0 /100
PH UR STRIP: 6 [PH] (ref 5–7)
PLATELET # BLD AUTO: 171 10E3/UL (ref 150–450)
POTASSIUM BLD-SCNC: 3.7 MMOL/L (ref 3.4–5.3)
PROT SERPL-MCNC: 7.6 G/DL (ref 6.8–8.8)
RBC # BLD AUTO: 4.73 10E6/UL (ref 3.8–5.2)
RBC URINE: 1 /HPF
SODIUM SERPL-SCNC: 139 MMOL/L (ref 133–144)
SP GR UR STRIP: 1.02 (ref 1–1.03)
SQUAMOUS EPITHELIAL: 1 /HPF
TRANSITIONAL EPI: <1 /HPF
TRIGL SERPL-MCNC: 101 MG/DL
TSH SERPL DL<=0.005 MIU/L-ACNC: 3.92 MU/L (ref 0.4–4)
UROBILINOGEN UR STRIP-MCNC: NORMAL MG/DL
WBC # BLD AUTO: 6.6 10E3/UL (ref 4–11)
WBC URINE: 1 /HPF

## 2022-02-25 PROCEDURE — 99214 OFFICE O/P EST MOD 30 MIN: CPT | Performed by: INTERNAL MEDICINE

## 2022-02-25 PROCEDURE — 86140 C-REACTIVE PROTEIN: CPT | Performed by: PATHOLOGY

## 2022-02-25 PROCEDURE — 83690 ASSAY OF LIPASE: CPT | Performed by: PATHOLOGY

## 2022-02-25 PROCEDURE — 82306 VITAMIN D 25 HYDROXY: CPT | Mod: 90 | Performed by: PATHOLOGY

## 2022-02-25 PROCEDURE — 80053 COMPREHEN METABOLIC PANEL: CPT | Performed by: PATHOLOGY

## 2022-02-25 PROCEDURE — 76700 US EXAM ABDOM COMPLETE: CPT | Performed by: RADIOLOGY

## 2022-02-25 PROCEDURE — 81001 URINALYSIS AUTO W/SCOPE: CPT | Performed by: PATHOLOGY

## 2022-02-25 PROCEDURE — 85652 RBC SED RATE AUTOMATED: CPT | Performed by: PATHOLOGY

## 2022-02-25 PROCEDURE — 84443 ASSAY THYROID STIM HORMONE: CPT | Performed by: PATHOLOGY

## 2022-02-25 PROCEDURE — 99000 SPECIMEN HANDLING OFFICE-LAB: CPT | Performed by: PATHOLOGY

## 2022-02-25 PROCEDURE — 85025 COMPLETE CBC W/AUTO DIFF WBC: CPT | Performed by: PATHOLOGY

## 2022-02-25 PROCEDURE — 36415 COLL VENOUS BLD VENIPUNCTURE: CPT | Performed by: PATHOLOGY

## 2022-02-25 PROCEDURE — 80061 LIPID PANEL: CPT | Performed by: PATHOLOGY

## 2022-02-25 ASSESSMENT — PAIN SCALES - GENERAL: PAINLEVEL: NO PAIN (0)

## 2022-02-25 NOTE — PATIENT INSTRUCTIONS
Thank you for visiting the [facility] today at the [facility]! The following is some information about our clinic:     Primary Care Center Frequently-Asked Questions    (1) How do I schedule appointments at the [facility]?     Primary Care--to schedule or make changes to an existing appointment, please call our primary care line at [phone].    Labs--to schedule a lab appointment at the [facility] you can use [employer] or call [phone]. If you have a [place] location that is closer to home, you can reach out to that location for scheduling options.     Imaging--if you need to schedule a CT, X-ray, MRI, ultrasound, or other imaging study you can call [phone] to schedule at the [facility] or any other [facility] imaging location.     Referrals--if a referral to another specialty was ordered you can expect a phone call from their scheduling team. If you have not heard from them in a week, please call us or send us a [employer] message to check the status or get a scheduling number. Please note that this only applies to internal [facility] referrals. If the referral is external you would need to contact their office for scheduling.     (2) I have a question about my visit, who do I contact?     You can call us at the primary care line at [phone] to ask questions about your visit. You can also send a secure message through [employer], which is reviewed by clinic staff. Please note that [employer] messages have a twenty-four to forty-eight business hour turnaround time and should not be used for urgent concerns.    (3) How will I get the results of my tests?    If you are signed up for [employer] all tests will be released to you within twenty-four hours of resulting. Please allow three to five days for your doctor to review your results and place a note interpreting the results. If you do not have [employer] you will receive your  results through mail seven to ten business days following the return of the tests. Please note that if there should be any urgent or concerning results that your doctor or their registered nurse will reach out to you the same day as the tests come back. If you have follow up questions about your results or would like to discuss the results in detail please schedule a follow up with your provider either in person or virtually.     (4) How do I get refills of my prescriptions?     You should always first contact your pharmacy for refills of your medications. If submitting a refill request on AGL[employer]IC, please be sure to submit the request only once--repeat requests can cause delays in refill. If you are requesting a NEW medication or a medication related to new symptoms you will need to schedule an appointment with a provider prior to approval. Please note: Routine medication refills have up to one to three business day turnaround whereas controlled substances refills have up to five to seven business day turnaround.    (5) I have new symptoms, what do I do?     If you are having an immediate medical emergency, you should dial 911 for assistance.   For anything urgent that needs to be seen within a few hours to one day you should visit a local urgent care for assistance.  For non-urgent symptoms that need to be seen within a few days to a week you can schedule with an available provider in primary care by going to [employer] or calling [phone].   If you are not sure how serious your symptoms are or you would like to receive medical advice you can always call [phone] to speak with a triage nurse.

## 2022-02-25 NOTE — NURSING NOTE
Bobby Ignacio is a 76 year old female patient that presents today in clinic for the following:    Chief Complaint   Patient presents with     Gastrointestinal Problem     Feels bloated and has had trouble sleeping because of the stomach pain     The patient's allergies and medications were reviewed as noted. A set of vitals were recorded as noted without incident. The patient does not have any other questions for the provider.    Bridgett Marie, EMT at 3:03 PM on 2/25/2022

## 2022-02-25 NOTE — PROGRESS NOTES
HPI  76-year-old presents today with a note from her  indicating that she has been having ongoing problems with bloating and abdominal pain as well as burning in the feet.  Burning in the feet has been present for some time is followed by neurology.  Is been found to have an S1 radiculopathy and assumed to have a peripheral neuropathy.  The abdominal pain is associated with bloating is generalized it seems to be improved with eating by her report and also bothers her at night.  She also reports that she has several loose stools throughout the course of the day and on occasion has nocturnal stools.  There is no bloody or black stools no nausea vomiting no fever chills or sweats.  In the past he was treated with omeprazole which is not believe that this provided her any relief.  She has not had any recent imaging endoscopy or investigation of this.  Past Medical History:   Diagnosis Date     Chest pain 4/2014     Depression      Fatigue      Gastritis      Unspecified hypothyroidism     Hypothyroidism     Past Surgical History:   Procedure Laterality Date     CHOLECYSTECTOMY, OPEN       Family History   Problem Relation Age of Onset     Cardiovascular Father         CHF     MENDYAYISEL. Father      Cardiovascular Brother         CHF     BARBARA. Brother      Cardiovascular Brother      Cardiovascular Brother      Glaucoma No family hx of      Macular Degeneration No family hx of          Exam:  BP (!) 144/90 (BP Location: Right arm, Patient Position: Sitting, Cuff Size: Adult Regular)   Pulse 97   Resp 16   Ht 1.524 m (5')   Wt 66.7 kg (147 lb)   SpO2 94%   BMI 28.71 kg/m    147 lbs 0 oz  PHYSICAL EXAMINATION:   The patient is alert, oriented with a clear sensorium.   Skin shows no lesions or rashes and good turgor.   Head is normocephalic and atraumatic.   Neck shows no nodes, thyromegaly or bruits.   Back is nontender.   Lungs are clear to percussion and auscultation.   Heart irregular normal S1 and S2 without  murmur or gallop.   Abdomen is soft, nontender without masses or organomegaly.   Extremities show no edema and no evidence of active synovitis.   Results for orders placed or performed in visit on 02/25/22   US Abdomen Complete     Status: None    Narrative    Complete abdomen ultrasound    INDICATION: Abdominal pain    COMPARISON: None    FINDINGS: Cholecystectomy. Liver appears normal, 12.1 cm in length.  The included abdominal aorta and IVC appear normal. Portal vein color  flow and Doppler waveforms normal. Common bile duct 5 mm. 5 x 4 x 3 mm  right renal cyst laterally. Renal length 10.1 cm. Left kidney appears  normal with length 9.6 cm. No ascites. Spleen appears normal, 7.8 cm  in maximum length. Pancreas is partially obscured by bowel gas  blocking.      Impression    IMPRESSION: Cholecystectomy. 4 mm mean diameter right renal cyst.    CARLIE FELIPE MD         SYSTEM ID:  [id number]   Results for orders placed or performed in visit on 02/25/22   Comprehensive metabolic panel     Status: Normal   Result Value Ref Range    Sodium 139 133 - 144 mmol/L    Potassium 3.7 3.4 - 5.3 mmol/L    Chloride 105 94 - 109 mmol/L    Carbon Dioxide (CO2) 30 20 - 32 mmol/L    Anion Gap 4 3 - 14 mmol/L    Urea Nitrogen 17 7 - 30 mg/dL    Creatinine 0.75 0.52 - 1.04 mg/dL    Calcium 9.3 8.5 - 10.1 mg/dL    Glucose 96 70 - 99 mg/dL    Alkaline Phosphatase 100 40 - 150 U/L    AST 29 0 - 45 U/L    ALT 26 0 - 50 U/L    Protein Total 7.6 6.8 - 8.8 g/dL    Albumin 3.9 3.4 - 5.0 g/dL    Bilirubin Total 0.7 0.2 - 1.3 mg/dL    GFR Estimate 82 >60 mL/min/1.73m2   Lipase     Status: Normal   Result Value Ref Range    Lipase 187 73 - 393 U/L   CRP inflammation     Status: Normal   Result Value Ref Range    CRP Inflammation <2.9 0.0 - 8.0 mg/L   Erythrocyte sedimentation rate auto     Status: Normal   Result Value Ref Range    Erythrocyte Sedimentation Rate 17 0 - 30 mm/hr   Lipid Profile     Status: None   Result Value Ref Range     Cholesterol 130 <200 mg/dL    Triglycerides 101 <150 mg/dL    Direct Measure HDL 50 >=50 mg/dL    LDL Cholesterol Calculated 60 <=100 mg/dL    Non HDL Cholesterol 80 <130 mg/dL    Patient Fasting > 8hrs? No     Narrative    Cholesterol  Desirable:  <200 mg/dL    Triglycerides  Normal:  Less than 150 mg/dL  Borderline High:  150-199 mg/dL  High:  200-499 mg/dL  Very High:  Greater than or equal to 500 mg/dL    Direct Measure HDL  Female:  Greater than or equal to 50 mg/dL   Male:  Greater than or equal to 40 mg/dL    LDL Cholesterol  Desirable:  <100mg/dL  Above Desirable:  100-129 mg/dL   Borderline High:  130-159 mg/dL   High:  160-189 mg/dL   Very High:  >= 190 mg/dL    Non HDL Cholesterol  Desirable:  130 mg/dL  Above Desirable:  130-159 mg/dL  Borderline High:  160-189 mg/dL  High:  190-219 mg/dL  Very High:  Greater than or equal to 220 mg/dL   TSH with free T4 reflex     Status: Normal   Result Value Ref Range    TSH 3.92 0.40 - 4.00 mU/L   UA with Microscopic reflex to Culture     Status: Abnormal    Specimen: Urine, Midstream   Result Value Ref Range    Color Urine Straw Colorless, Straw, Light Yellow, Yellow    Appearance Urine Clear Clear    Glucose Urine Negative Negative mg/dL    Bilirubin Urine Negative Negative    Ketones Urine Negative Negative mg/dL    Specific Gravity Urine 1.025 1.003 - 1.035    Blood Urine Negative Negative    pH Urine 6.0 5.0 - 7.0    Protein Albumin Urine Negative Negative mg/dL    Urobilinogen Urine Normal Normal, 2.0 mg/dL    Nitrite Urine Negative Negative    Leukocyte Esterase Urine Negative Negative    Mucus Urine Present (A) None Seen /LPF    RBC Urine 1 <=2 /HPF    WBC Urine 1 <=5 /HPF    Squamous Epithelials Urine 1 <=1 /HPF    Transitional Epithelials Urine <1 <=1 /HPF    Narrative    Urine Culture not indicated   Vitamin D Deficiency     Status: Normal   Result Value Ref Range    Vitamin D, Total (25-Hydroxy) 66 20 - 75 ug/L    Narrative    Season, race, dietary  intake, and treatment affect the concentration of 25-hydroxy-Vitamin D. Values may decrease during winter months and increase during summer months. Values 20-29 ug/L may indicate Vitamin D insufficiency and values <20 ug/L may indicate Vitamin D deficiency.    Vitamin D determination is routinely performed by an immunoassay specific for 25 hydroxyvitamin D3.  If an individual is on vitamin D2(ergocalciferol) supplementation, please specify 25 OH vitamin D2 and D3 level determination by LCMSMS test VITD23.     CBC with platelets and differential     Status: None   Result Value Ref Range    WBC Count 6.6 4.0 - 11.0 10e3/uL    RBC Count 4.73 3.80 - 5.20 10e6/uL    Hemoglobin 13.4 11.7 - 15.7 g/dL    Hematocrit 41.3 35.0 - 47.0 %    MCV 87 78 - 100 fL    MCH 28.3 26.5 - 33.0 pg    MCHC 32.4 31.5 - 36.5 g/dL    RDW 13.4 10.0 - 15.0 %    Platelet Count 171 150 - 450 10e3/uL    % Neutrophils 63 %    % Lymphocytes 23 %    % Monocytes 9 %    % Eosinophils 4 %    % Basophils 1 %    % Immature Granulocytes 0 %    NRBCs per 100 WBC 0 <1 /100    Absolute Neutrophils 4.1 1.6 - 8.3 10e3/uL    Absolute Lymphocytes 1.6 0.8 - 5.3 10e3/uL    Absolute Monocytes 0.6 0.0 - 1.3 10e3/uL    Absolute Eosinophils 0.3 0.0 - 0.7 10e3/uL    Absolute Basophils 0.0 0.0 - 0.2 10e3/uL    Absolute Immature Granulocytes 0.0 <=0.4 10e3/uL    Absolute NRBCs 0.0 10e3/uL   CBC with Platelets & Differential     Status: None    Narrative    The following orders were created for panel order CBC with Platelets & Differential.  Procedure                               Abnormality         Status                     ---------                               -----------         ------                     CBC with platelets and d...[[id number]]                      Final result                 Please view results for these tests on the individual orders.       ASSESSMENT  1.  Abdominal pain bloating and nocturnal stools uncertain etiology  2.  Hypertension aggravated  by above  3.  Atrial fibrillation  4   History of depression, on sertraline.   5.  Hypothyroidism on replacement  6.  Vitamin D deficiency resolved  7.  S1 radiculopathy  Followed by neurology  8.  Hyperlipidemia on atorvastatin    Plan  We discussed nonpharmacologic blood pressure control measures.  She indicated she cannot eat fruit due to its effect on her abdomen bloating in bowels.  We will investigate her labs today set her up for abdominal ultrasound as well as gastroscopy and colonoscopy.  We will have her follow-up after that.  Over 30 minutes spent on the day of service in chart review, patient contact, record completion and review and notification of lab reports    This note was completed using Dragon voice recognition software.      Pete Wilkins MD  General Internal Medicine  [facility]  [phone]

## 2022-02-26 ENCOUNTER — NURSE TRIAGE (OUTPATIENT)
Dept: NURSING | Facility: CLINIC | Age: 77
End: 2022-02-26
Payer: COMMERCIAL

## 2022-02-26 NOTE — TELEPHONE ENCOUNTER
Patient's  calling - verbal consent given by patient over the phone. Says patient saw her PCP yesterday.  He says they are a bit confused about which medications she is to stop and what the new medications are.  Reviewed with caller the three medications to stop per her after visit summary:  Pepto-Bismol, Prednisone and Trazodone.  Advised caller there are no new medications listed.    He says they have some questions about the medications listed on the after visit summary:     Levothyroxine is listed twice with two different dosages.  She has levothyroxine 125 mcg at home -is this the correct dose to continue?      Also she does not have apixaban - is this something she should be taking?     If she is to continue the rest of the medications on the medication list she will need refills of:  apixaban  metoprolol succinate  sertraline  duloxetine  Preferred Pharmacy : [facility]     Message to PCP care team for Monday.  Please call patient and/or  to discuss medication questions.    Shea Grodon RN  Triage Nurse Advisor    Reason for Disposition    [1] Caller has NON-URGENT medication question about med that PCP prescribed AND [2] triager unable to answer question    Protocols used: MEDICATION QUESTION CALL-A-

## 2022-02-28 ENCOUNTER — TELEPHONE (OUTPATIENT)
Dept: GASTROENTEROLOGY | Facility: CLINIC | Age: 77
End: 2022-02-28
Payer: COMMERCIAL

## 2022-02-28 ENCOUNTER — TELEPHONE (OUTPATIENT)
Dept: INTERNAL MEDICINE | Facility: CLINIC | Age: 77
End: 2022-02-28
Payer: COMMERCIAL

## 2022-02-28 NOTE — TELEPHONE ENCOUNTER
[facility] Call Center    Phone Message    May a detailed message be left on voicemail: yes     Reason for Call: Other: Patient and spouse were calling regarding the patient's medication and which she should be taking. Please call and discuss.     Action Taken: Message routed to:  [facility]    Travel Screening: Not Applicable

## 2022-02-28 NOTE — TELEPHONE ENCOUNTER
Patient  picked up and states patient is not home. Will call back later- provided department number.

## 2022-03-01 NOTE — TELEPHONE ENCOUNTER
[facility] Call Center    Phone Message    May a detailed message be left on voicemail: yes     Reason for Call: Other: Patient's spouse called returning a voicemail regarding medications. Please call back when available.      Action Taken: Message routed to:  [facility]    Travel Screening: Not Applicable

## 2022-03-01 NOTE — CONFIDENTIAL NOTE
Attempted to reach Bobby regarding recent call to clinic but reached VM. Left VM asking for a return call if they still wanted to discuss medication.  Hilaria Mendoza RN

## 2022-03-02 NOTE — TELEPHONE ENCOUNTER
[facility] Call Center    Phone Message    May a detailed message be left on voicemail: yes     Reason for Call: Other: Patient's  calling back Nurse Hilaria regarding meds. Writer informed him that she is out of clinic today. Requesting a call back from a nurse to discuss meds today.      #: [phone]    Action Taken: Message routed to:  [facility]    Travel Screening: Not Applicable

## 2022-03-03 NOTE — CONFIDENTIAL NOTE
Attempted to return Bobby's call to clinic but reached . Left  requesting a return call and gave clinic number.  Hilaria Mendoza RN

## 2022-03-04 NOTE — TELEPHONE ENCOUNTER
[facility] Call Center    Phone Message    May a detailed message be left on voicemail: yes     Reason for Call: Other: Patient's  calling back and requesting to speak to the nurse about thyroid meds. Please call back to discuss asap.     Action Taken: Message routed to:  [facility]    Travel Screening: Not Applicable

## 2022-03-07 NOTE — CONFIDENTIAL NOTE
------- If patient returns call, please help them make a virtual visit with any provider in [facility] with availability. Thanks    Attempted to return Bobby's 's call to clinic but reached voicemail. Left vm suggesting an appointment to discuss medications. Gave clinic number to schedule.

## 2022-03-09 ENCOUNTER — OFFICE VISIT (OUTPATIENT)
Dept: OPHTHALMOLOGY | Facility: CLINIC | Age: 77
End: 2022-03-09
Attending: OPHTHALMOLOGY
Payer: COMMERCIAL

## 2022-03-09 DIAGNOSIS — H10.13 ALLERGIC CONJUNCTIVITIS OF BOTH EYES: ICD-10-CM

## 2022-03-09 DIAGNOSIS — H01.00A BLEPHARITIS OF UPPER AND LOWER EYELIDS OF BOTH EYES, UNSPECIFIED TYPE: Primary | ICD-10-CM

## 2022-03-09 DIAGNOSIS — H01.00B BLEPHARITIS OF UPPER AND LOWER EYELIDS OF BOTH EYES, UNSPECIFIED TYPE: Primary | ICD-10-CM

## 2022-03-09 DIAGNOSIS — H25.813 MIXED TYPE AGE-RELATED CATARACT, BOTH EYES: ICD-10-CM

## 2022-03-09 PROCEDURE — 99213 OFFICE O/P EST LOW 20 MIN: CPT | Performed by: OPHTHALMOLOGY

## 2022-03-09 PROCEDURE — G0463 HOSPITAL OUTPT CLINIC VISIT: HCPCS

## 2022-03-09 ASSESSMENT — REFRACTION_WEARINGRX
OS_AXIS: 170
OS_SPHERE: +0.75
OD_SPHERE: +0.50
OS_ADD: +2.75
OD_CYLINDER: +0.50
OD_AXIS: 150
OS_CYLINDER: +2.00
OD_ADD: +2.75
SPECS_TYPE: LAST MRX IN PHOROPTER

## 2022-03-09 ASSESSMENT — EXTERNAL EXAM - RIGHT EYE: OD_EXAM: NORMAL

## 2022-03-09 ASSESSMENT — VISUAL ACUITY
OD_SC: 20/30
OS_PH_SC: 20/40
OS_SC+: +1
OS_PH_SC+: -1
OS_SC: 20/50
METHOD: SNELLEN - LINEAR
OD_PH_SC: 20/25

## 2022-03-09 ASSESSMENT — CONF VISUAL FIELD
METHOD: COUNTING FINGERS
OD_NORMAL: 1
OS_NORMAL: 1

## 2022-03-09 ASSESSMENT — EXTERNAL EXAM - LEFT EYE: OS_EXAM: NORMAL

## 2022-03-09 ASSESSMENT — TONOMETRY
IOP_METHOD: ICARE
OS_IOP_MMHG: 10
OD_IOP_MMHG: 10

## 2022-03-09 NOTE — NURSING NOTE
Chief Complaints and History of Present Illnesses   Patient presents with     Conjunctivitis Follow Up     Chief Complaint(s) and History of Present Illness(es)     Conjunctivitis Follow Up     Laterality: both eyes    Associated symptoms: irritation, burning and itching    Frequency: constantly    Duration: 2 weeks    Treatments tried: artificial tears              Comments     Follow up for irritated,burning and itching eyes.   FB sensation in the left eye.   She notes increased symptoms for two weeks.    She is bothered by the sun.  She has decreased night vision when headlights from cars are near her  YISEL Ibarra, COA 3:08 PM 03/09/2022

## 2022-03-09 NOTE — PATIENT INSTRUCTIONS
"Instructions for your blepharitis:  Follow these steps twice a day:     1.  Soak the eyelids for 5-10 minutes with a hot wet cloth -- as hot as you can but not so hot that you burn yourself.  An easy way to make a long-lasting warm compress is to use a microwaveable gel mask, Abraham eye mask (available online or  Pharmacy) or wrap a hot potato in a wet washcloth.  If you use the microwave to heat anything, be VERY CAREFUL that it is not too hot as microwaved foods and cloths can have very uneven hot spots that pose a burn hazard.       2.  After the eyelids are soft and refreshed from the hot compress, clean the debris from the glands at the bases of the eyelashes.  With a warm wet washcloth wrapped around your index finger, use the tip of your finger to vigorously scrub the bases of the eyelashes.  The principle is similar to brushing your teeth but here you can use a side-to-side motion.  Perform ten strokes per eyelid across the entire length of the eyelid. You can use plain water for this brushing but many patients claim better results if they use a tiny drop of Scar's or Aveeno \"tear-free\" baby shampoo in a glass of water.  Alternatively, you may try Ocusoft towelettes or foam.  This cleaning dislodges and removes the caked-in secretions in the gland and debris on the eyelids.  Do NOT wash the EYEBALL.    3.  To rinse your eye, use over the counter artificial tear drops liberally after eyelid scrubs (and also in between up to 4-6 times daily) in both eyes.  Use a preservative-free eyedrop if using more frequently than that.  Any brand is ok to try, there are minimal differences.  Preservative-free drops (best if using more than 6 x daily) brands include: Celluvisc, Refresh, Systane, Blink, Optive.  Do NOT use Visine, Clear Eyes, or any \"anti-redness\" eye drops.  These can worsen your eye redness and irritation over time.      4.  Use PATADAY  allergy eye drop once a day both eyes     If these measures are " not helping, we may need to take other measurements to get your eye comfortable.  Please call the clinic if you need a sooner appointment.

## 2022-03-09 NOTE — PROGRESS NOTES
HPI  Bobby Ignacio is a 76 year old female here for comprehensive eye exam.  Was last seen for eyelid lesion and blepharitis. Still complains of itching both eyes and foreign body sensation left eye has been present off and on, more often in the last 2 weeks.   She is bothered by the sun.  She has decreased night vision when headlights from cars are near her.    Alaway eye drops twice a day not helping.  Pimple like lesion on the outer corner left eye for about a month. Lid tenderness comes and goes.  Distance vision not totally clear but hasn't changed recently.     PMH:   Past Medical History:   Diagnosis Date     Chest pain 4/2014     Depression      Fatigue      Gastritis      Unspecified hypothyroidism     Hypothyroidism        POH: hypermetropia/presbyopia glasses , posterior vitreous detachment, no surgery, no trauma, Mixed type age-related cataract, posterior vitreous detachment both eyes   Oc Meds: alaway twice a day both eyes   FH: Denies any glaucoma, age related macular degeneration, or other known eye diseases       Assessment & Plan      (H01.00A,  H01.00B) Blepharitis of upper and lower eyelids of both eyes, unspecified type  Comment: etiology of itching  Plan:   Warm compresses 1-2 times daily   Lid scrubs morning and evening: use diluted baby shampoo on warm washcloth  or cotton-tipped applicator to clean lid margins carefully from side to side   Artificial tears 4-6 times per day as needed for discomfort    (H10.13) Allergic conjunctivitis of both eyes - Both Eyes  (primary encounter diagnosis)  Comment: signs of eye rubbing, ? environmental exposure  Plan: frequent flushing with artificial tear drops   Pataday over the counter (red box) once daily both eyes   Wipe lids before bed    (H25.813) Mixed type age-related cataract, both eyes  Due for annual exam but will wait for that until above resolved, deferred refraction today  Manifest refraction next visit and dilated fundus exam    -----------------------------------------------------------------------------------       Patient disposition:   Return in about 2 weeks (around 3/23/2022) for Comprehensive Exam (f/u blepharitis as  well, needs  cee). Patient to call sooner as needed.    Complete documentation of historical and exam elements from today's encounter can be found in the full encounter summary report (not reduplicated in this progress note). I personally obtained the chief complaint(s) and history of present illness.  I have confirmed and edited as necessary the CC, HPI, PMH/PSH, social history, FMH, ROS, and exam/neuro findings as obtained by the technician or others. I have examined this patient myself and I personally viewed the image(s) and studies listed above and the documentation reflects my findings and interpretation.     Ernestina Mckinney MD

## 2022-03-14 ENCOUNTER — OFFICE VISIT (OUTPATIENT)
Dept: INTERNAL MEDICINE | Facility: CLINIC | Age: 77
End: 2022-03-14
Payer: COMMERCIAL

## 2022-03-14 VITALS
BODY MASS INDEX: 29.84 KG/M2 | SYSTOLIC BLOOD PRESSURE: 143 MMHG | OXYGEN SATURATION: 98 % | RESPIRATION RATE: 16 BRPM | DIASTOLIC BLOOD PRESSURE: 91 MMHG | HEART RATE: 93 BPM | HEIGHT: 60 IN | WEIGHT: 152 LBS

## 2022-03-14 DIAGNOSIS — I48.0 PAROXYSMAL ATRIAL FIBRILLATION (H): Primary | ICD-10-CM

## 2022-03-14 PROCEDURE — 99214 OFFICE O/P EST MOD 30 MIN: CPT | Performed by: INTERNAL MEDICINE

## 2022-03-14 ASSESSMENT — PAIN SCALES - GENERAL: PAINLEVEL: NO PAIN (0)

## 2022-03-14 NOTE — NURSING NOTE
Bobby Igncaio is a 76 year old female patient that presents today in clinic for the following:    Chief Complaint   Patient presents with     RECHECK     Discuss lab results and dry mouth     Recheck Medication     The patient's allergies and medications were reviewed as noted. A set of vitals were recorded as noted without incident. The patient does not have any other questions for the provider.    Bridgett Marie, EMT at 5:30 PM on 3/14/2022

## 2022-03-14 NOTE — PROGRESS NOTES
HPI  76-year-old turns today for follow-up evaluation regarding her recent laboratory studies and her medications.  She brings her medications in with her and were able to straighten out her med list.  She has been on the 125 levothyroxine as her recent TSH was normal and we will continue her on this.  She taken the metoprolol for blood pressure she is not on an anticoagulation for her atrial fibrillation despite a CHADS2 score of 3.  We discussed resuming the apixaban and she agreed.  If this is an expensive option will investigate other options for her.  Abdominal bloating has improved significantly although she still has intermittent abdominal discomfort and bloating.  She has not had any change in bowel movements in association with this.  We reviewed her recent laboratory studies which were normal along with her abdominal ultrasound which is normal.  She has upcoming GI appointment and referral for gastroscopy and colonoscopy  Past Medical History:   Diagnosis Date     Chest pain 4/2014     Depression      Fatigue      Gastritis      Unspecified hypothyroidism     Hypothyroidism     Past Surgical History:   Procedure Laterality Date     CHOLECYSTECTOMY, OPEN       Family History   Problem Relation Age of Onset     Cardiovascular Father         CHF     C.A.D. Father      Cardiovascular Brother         CHF     C.AYISEL. Brother      Cardiovascular Brother      Cardiovascular Brother      Glaucoma No family hx of      Macular Degeneration No family hx of          Exam:  BP (!) 143/91 (BP Location: Right arm, Patient Position: Sitting, Cuff Size: Adult Regular)   Pulse 93   Resp 16   Ht 1.524 m (5')   Wt 68.9 kg (152 lb)   SpO2 98%   BMI 29.69 kg/m    152 lbs 0 oz  The patient is alert, oriented with a clear sensorium.   Skin shows no lesions or rashes and good turgor.   Head is normocephalic and atraumatic.    Neck shows no nodes, thyromegaly.     Lungs are clear.   Heart irreg irreg normal S1 and S2 without  murmur or gallop.    Abdomen is soft and nontender  Extremities show no edema.    ASSESSMENT  1.  Atrial fibrillation off apixaban  2.  Hypertension   3.  History of depression, on sertraline.   4   Hyperlipidemia on atorvastatin  5.  Hypothyroidism on replacement  6.  Vitamin D deficiency resolved  7.  S1 radiculopathy  Followed by neurology  8 abdominal bloating uncertain etiology needs endoscopy    Plan  Await the results of her gastroscopy and colonoscopy we will continue her on her present medications and add back the apixaban 5 mg twice daily.  Her follow-up in 6 months for reassessment or follow-up sooner immediately if any increased symptoms or problems        This note was completed using Dragon voice recognition software.      Pete Wilkins MD  General Internal Medicine  [facility]  [phone]

## 2022-04-05 ENCOUNTER — OFFICE VISIT (OUTPATIENT)
Dept: OPHTHALMOLOGY | Facility: CLINIC | Age: 77
End: 2022-04-05
Payer: COMMERCIAL

## 2022-04-05 VITALS — WEIGHT: 144 LBS | HEIGHT: 57 IN | BODY MASS INDEX: 31.07 KG/M2

## 2022-04-05 DIAGNOSIS — H25.813 MIXED TYPE AGE-RELATED CATARACT, BOTH EYES: Primary | ICD-10-CM

## 2022-04-05 DIAGNOSIS — H01.00B BLEPHARITIS OF UPPER AND LOWER EYELIDS OF BOTH EYES, UNSPECIFIED TYPE: ICD-10-CM

## 2022-04-05 DIAGNOSIS — H01.00A BLEPHARITIS OF UPPER AND LOWER EYELIDS OF BOTH EYES, UNSPECIFIED TYPE: ICD-10-CM

## 2022-04-05 DIAGNOSIS — H52.02 HYPEROPIA OF LEFT EYE: ICD-10-CM

## 2022-04-05 PROCEDURE — 92015 DETERMINE REFRACTIVE STATE: CPT | Performed by: OPHTHALMOLOGY

## 2022-04-05 PROCEDURE — 92014 COMPRE OPH EXAM EST PT 1/>: CPT | Performed by: OPHTHALMOLOGY

## 2022-04-05 ASSESSMENT — VISUAL ACUITY
OD_CC: J1+/-3
OD_SC: 20/40
OS_SC: 20/60
OD_SC+: +2
OS_CC: J1-3
METHOD: SNELLEN - LINEAR

## 2022-04-05 ASSESSMENT — TONOMETRY
OS_IOP_MMHG: 14
IOP_METHOD: ICARE
OD_IOP_MMHG: 14

## 2022-04-05 ASSESSMENT — REFRACTION_WEARINGRX
SPECS_TYPE: LAST MRX IN PHOROPTER
OD_CYLINDER: +1.00
OD_SPHERE: +2.50
OD_AXIS: 159
OS_CYLINDER: +1.00
OS_AXIS: 016
OS_SPHERE: +3.00

## 2022-04-05 ASSESSMENT — REFRACTION_MANIFEST
OS_ADD: +2.50
OD_SPHERE: PLANO
OS_CYLINDER: +1.00
OD_ADD: +2.50
OD_AXIS: 165
OS_AXIS: 003
OS_SPHERE: +1.50
OD_CYLINDER: +1.00

## 2022-04-05 ASSESSMENT — EXTERNAL EXAM - RIGHT EYE: OD_EXAM: NORMAL

## 2022-04-05 ASSESSMENT — CONF VISUAL FIELD
OD_NORMAL: 1
OS_NORMAL: 1
METHOD: COUNTING FINGERS

## 2022-04-05 ASSESSMENT — EXTERNAL EXAM - LEFT EYE: OS_EXAM: NORMAL

## 2022-04-05 ASSESSMENT — CUP TO DISC RATIO
OS_RATIO: 0.3
OD_RATIO: 0.3

## 2022-04-05 NOTE — PROGRESS NOTES
"HPI  Bobby Ignacio is a 76 year old female here for comprehensive eye exam.  Vision is acceptable for daily activities but wants new glasses and says \"they could be better\".  Irritation is better with warm moist compresses and pataday. Pimple like lesion on the outer corner left eye for about a month. Lid tenderness comes and goes.       PMH:   Past Medical History:   Diagnosis Date     Chest pain 4/2014     Depression      Fatigue      Gastritis      Unspecified hypothyroidism     Hypothyroidism        POH: hypermetropia/presbyopia glasses , posterior vitreous detachment, no surgery, no trauma, Mixed type age-related cataract, posterior vitreous detachment both eyes   Oc Meds: alaway twice a day both eyes   FH: Denies any glaucoma, age related macular degeneration, or other known eye diseases       Assessment & Plan      (H25.813) Mixed type age-related cataract, both eyes  Comment:   Visually significant left eye and becoming significant right eye  Discussed improving vision with cataract extraction and she thinks she may be ready later this year.  Plan:  Manifest refraction done and new glasses given, if patient decides to proceed with surgery will call for appointment, will need IOL master and surgical discussion then    (H01.00A,  H01.00B) Blepharitis of upper and lower eyelids of both eyes, unspecified type  Comment: better   Plan:   Continue warm moist compresses and lid hygiene    Artificial tears 4-6 times per day as needed for discomfort  Continue Pataday over the counter (red box) once daily both eyes   Wipe lids before bed    (H52.02) Hyperopia of left eye - Left Eye  Comment: myopic shift from cataract   Plan: manifest refraction done and prescription for glasses given     -----------------------------------------------------------------------------------       Patient disposition:   Return in about 1 year (around 4/5/2023) for Comprehensive Exam- cataracts, blepharitis, IOL master. Patient to call " sooner as needed.    Complete documentation of historical and exam elements from today's encounter can be found in the full encounter summary report (not reduplicated in this progress note). I personally obtained the chief complaint(s) and history of present illness.  I have confirmed and edited as necessary the CC, HPI, PMH/PSH, social history, FMH, ROS, and exam/neuro findings as obtained by the technician or others. I have examined this patient myself and I personally viewed the image(s) and studies listed above and the documentation reflects my findings and interpretation.     Ernestina Mckinney MD

## 2022-04-05 NOTE — NURSING NOTE
Chief Complaints and History of Present Illnesses   Patient presents with     COMPREHENSIVE EYE EXAM     Here for exam and follow up with Blepharitis each eye.   Would like to get Rx for glasses and sunglasses.      Chief Complaint(s) and History of Present Illness(es)     COMPREHENSIVE EYE EXAM     Laterality: both eyes    Associated symptoms: tearing, redness and dryness.  Negative for eye pain, discharge and swelling    Treatments tried: no treatments    Pain scale: 0/10    Comments: Here for exam and follow up with Blepharitis each eye.   Would like to get Rx for glasses and sunglasses.               Comments     Lids are feeling better still some irritation with DEDRICK at times but no pattern to onset. Is not bothering at the moment. Feel like vision each eye is OK with glasses. each eye get red and irritated at times feels related to allergy spring and summer months. No bother at the moment.     Present glasses could be stronger. Reports using them for reading. Wants to get something for vision at all focal points to wear full times.     Carol Burgess, COT COT 2:25 PM April 5, 2022

## 2022-04-25 ENCOUNTER — TELEPHONE (OUTPATIENT)
Dept: GASTROENTEROLOGY | Facility: CLINIC | Age: 77
End: 2022-04-25
Payer: COMMERCIAL

## 2022-04-25 NOTE — TELEPHONE ENCOUNTER
Called to remind patient of their upcoming appointment with our GI clinic, on Wednesday 5/4/2022 at 2:45PM with Dr. Ferraro. This appointment is scheduled as an in-person appt. Please arrive 15 minutes early to check in for your appointment. , if your appointment is virtual (video or telephone) you need to be in [place] for the visit. To reschedule or cancel patient to call [phone].    Sayra Jama MA

## 2022-04-26 ENCOUNTER — TELEPHONE (OUTPATIENT)
Dept: GASTROENTEROLOGY | Facility: CLINIC | Age: 77
End: 2022-04-26
Payer: COMMERCIAL

## 2022-04-26 NOTE — TELEPHONE ENCOUNTER
Called to remind patient of their upcoming appointment with our GI clinic, on Wednesday 5/4/2022 at 2:45PM with Dr. Ferraro. This appointment is scheduled as an in-person appt. Please arrive 15 minutes early to check in for your appointment. , if your appointment is virtual (video or telephone) you need to be in [place] for the visit. To reschedule or cancel patient to call [phone].     Sayra Jama MA

## 2022-04-29 ENCOUNTER — TELEPHONE (OUTPATIENT)
Dept: GASTROENTEROLOGY | Facility: CLINIC | Age: 77
End: 2022-04-29
Payer: COMMERCIAL

## 2022-05-03 ENCOUNTER — LAB (OUTPATIENT)
Dept: LAB | Facility: CLINIC | Age: 77
End: 2022-05-03
Payer: COMMERCIAL

## 2022-05-03 ENCOUNTER — OFFICE VISIT (OUTPATIENT)
Dept: GASTROENTEROLOGY | Facility: CLINIC | Age: 77
End: 2022-05-03
Payer: COMMERCIAL

## 2022-05-03 VITALS
DIASTOLIC BLOOD PRESSURE: 82 MMHG | WEIGHT: 150.7 LBS | SYSTOLIC BLOOD PRESSURE: 141 MMHG | HEART RATE: 108 BPM | HEIGHT: 57 IN | OXYGEN SATURATION: 96 % | BODY MASS INDEX: 32.51 KG/M2

## 2022-05-03 DIAGNOSIS — R14.0 POSTPRANDIAL ABDOMINAL BLOATING: ICD-10-CM

## 2022-05-03 DIAGNOSIS — R14.0 BLOATING: ICD-10-CM

## 2022-05-03 DIAGNOSIS — R53.83 FATIGUE, UNSPECIFIED TYPE: Primary | ICD-10-CM

## 2022-05-03 DIAGNOSIS — K90.49 MALABSORPTION DUE TO INTOLERANCE, NOT ELSEWHERE CLASSIFIED: ICD-10-CM

## 2022-05-03 DIAGNOSIS — R20.2 PARESTHESIA: ICD-10-CM

## 2022-05-03 DIAGNOSIS — R53.83 FATIGUE, UNSPECIFIED TYPE: ICD-10-CM

## 2022-05-03 DIAGNOSIS — K52.9 CHRONIC DIARRHEA: ICD-10-CM

## 2022-05-03 LAB
ALBUMIN SERPL-MCNC: 3.8 G/DL (ref 3.4–5)
ALP SERPL-CCNC: 101 U/L (ref 40–150)
ALT SERPL W P-5'-P-CCNC: 22 U/L (ref 0–50)
ANION GAP SERPL CALCULATED.3IONS-SCNC: 5 MMOL/L (ref 3–14)
AST SERPL W P-5'-P-CCNC: 19 U/L (ref 0–45)
BASOPHILS # BLD AUTO: 0 10E3/UL (ref 0–0.2)
BASOPHILS NFR BLD AUTO: 0 %
BILIRUB SERPL-MCNC: 0.5 MG/DL (ref 0.2–1.3)
BUN SERPL-MCNC: 17 MG/DL (ref 7–30)
CALCIUM SERPL-MCNC: 9.1 MG/DL (ref 8.5–10.1)
CHLORIDE BLD-SCNC: 105 MMOL/L (ref 94–109)
CO2 SERPL-SCNC: 30 MMOL/L (ref 20–32)
CREAT SERPL-MCNC: 0.65 MG/DL (ref 0.52–1.04)
CRP SERPL-MCNC: <2.9 MG/L (ref 0–8)
EOSINOPHIL # BLD AUTO: 0.2 10E3/UL (ref 0–0.7)
EOSINOPHIL NFR BLD AUTO: 3 %
ERYTHROCYTE [DISTWIDTH] IN BLOOD BY AUTOMATED COUNT: 12.5 % (ref 10–15)
ERYTHROCYTE [SEDIMENTATION RATE] IN BLOOD BY WESTERGREN METHOD: 13 MM/HR (ref 0–30)
FERRITIN SERPL-MCNC: 118 NG/ML (ref 8–252)
GFR SERPL CREATININE-BSD FRML MDRD: >90 ML/MIN/1.73M2
GLUCOSE BLD-MCNC: 108 MG/DL (ref 70–99)
HCT VFR BLD AUTO: 40.5 % (ref 35–47)
HGB BLD-MCNC: 13.6 G/DL (ref 11.7–15.7)
IMM GRANULOCYTES # BLD: 0 10E3/UL
IMM GRANULOCYTES NFR BLD: 0 %
IRON SATN MFR SERPL: 40 % (ref 15–46)
IRON SERPL-MCNC: 131 UG/DL (ref 35–180)
LYMPHOCYTES # BLD AUTO: 1.4 10E3/UL (ref 0.8–5.3)
LYMPHOCYTES NFR BLD AUTO: 18 %
MCH RBC QN AUTO: 29.6 PG (ref 26.5–33)
MCHC RBC AUTO-ENTMCNC: 33.6 G/DL (ref 31.5–36.5)
MCV RBC AUTO: 88 FL (ref 78–100)
MONOCYTES # BLD AUTO: 0.6 10E3/UL (ref 0–1.3)
MONOCYTES NFR BLD AUTO: 8 %
NEUTROPHILS # BLD AUTO: 5.4 10E3/UL (ref 1.6–8.3)
NEUTROPHILS NFR BLD AUTO: 71 %
NRBC # BLD AUTO: 0 10E3/UL
NRBC BLD AUTO-RTO: 0 /100
PLATELET # BLD AUTO: 153 10E3/UL (ref 150–450)
POTASSIUM BLD-SCNC: 3.9 MMOL/L (ref 3.4–5.3)
PROT SERPL-MCNC: 7.3 G/DL (ref 6.8–8.8)
RBC # BLD AUTO: 4.6 10E6/UL (ref 3.8–5.2)
SODIUM SERPL-SCNC: 140 MMOL/L (ref 133–144)
TIBC SERPL-MCNC: 329 UG/DL (ref 240–430)
TSH SERPL DL<=0.005 MIU/L-ACNC: 3.39 MU/L (ref 0.4–4)
VIT B12 SERPL-MCNC: 491 PG/ML (ref 193–986)
WBC # BLD AUTO: 7.6 10E3/UL (ref 4–11)

## 2022-05-03 PROCEDURE — 83550 IRON BINDING TEST: CPT | Performed by: PATHOLOGY

## 2022-05-03 PROCEDURE — 85027 COMPLETE CBC AUTOMATED: CPT | Performed by: PATHOLOGY

## 2022-05-03 PROCEDURE — 86140 C-REACTIVE PROTEIN: CPT | Performed by: PATHOLOGY

## 2022-05-03 PROCEDURE — 99000 SPECIMEN HANDLING OFFICE-LAB: CPT | Performed by: PATHOLOGY

## 2022-05-03 PROCEDURE — 85652 RBC SED RATE AUTOMATED: CPT | Performed by: PATHOLOGY

## 2022-05-03 PROCEDURE — 82306 VITAMIN D 25 HYDROXY: CPT | Mod: 90 | Performed by: PATHOLOGY

## 2022-05-03 PROCEDURE — 84443 ASSAY THYROID STIM HORMONE: CPT | Performed by: PATHOLOGY

## 2022-05-03 PROCEDURE — 82607 VITAMIN B-12: CPT | Performed by: PATHOLOGY

## 2022-05-03 PROCEDURE — 36415 COLL VENOUS BLD VENIPUNCTURE: CPT | Performed by: PATHOLOGY

## 2022-05-03 PROCEDURE — 86364 TISS TRNSGLTMNASE EA IG CLAS: CPT | Mod: 90 | Performed by: PATHOLOGY

## 2022-05-03 PROCEDURE — 80053 COMPREHEN METABOLIC PANEL: CPT | Performed by: PATHOLOGY

## 2022-05-03 PROCEDURE — 99204 OFFICE O/P NEW MOD 45 MIN: CPT | Mod: GC | Performed by: STUDENT IN AN ORGANIZED HEALTH CARE EDUCATION/TRAINING PROGRAM

## 2022-05-03 PROCEDURE — 82784 ASSAY IGA/IGD/IGG/IGM EACH: CPT | Mod: 90 | Performed by: PATHOLOGY

## 2022-05-03 PROCEDURE — 86258 DGP ANTIBODY EACH IG CLASS: CPT | Mod: 90 | Performed by: PATHOLOGY

## 2022-05-03 PROCEDURE — 82728 ASSAY OF FERRITIN: CPT | Performed by: PATHOLOGY

## 2022-05-03 NOTE — PATIENT INSTRUCTIONS
MsJyothi Mateus,     It was nice to meet you in the GI clinic today. Please see below for the next steps from today's visit.     We have ordered some lab tests. Please stop on the first floor of this clinic building to have your blood drawn before you leave. We will call with the results of these tests. One of the tests is a stool sample as well. You will bring the kit home with you, provide the stool sample, and you can drop it back off at any [facility] laboratory or clinic.   We will schedule you for both an upper endoscopy (to look at your stomach and intestines) as well as a colonoscopy (to look at your large intestine) in the next few months. It has been 10 years since you have had these procedures and your symptoms appear to be worsening. Also, we are unable to access these records from when they were completed in [facility].   Please continue to use the Gax-X medication as needed for your gas and bloating symptoms.   We will see you in clinic again in 6 months.    Please reach out to our clinic with any questions or concerns.     Dr. Jasmine Way and Dr. Anderson

## 2022-05-03 NOTE — LETTER
5/3/2022         RE: Bobby Ignacio  [address] [id number]        Dear Colleague,    Thank you for referring your patient, Bobby Ignacio, to the [facility] [place]. Please see a copy of my visit note below.    [facility]:     New Consult: abdominal fullness, bloating, diarrhea    Date of visit: 5/3/2022     Referring provider: Dr. Pete Wilkins    CC: 76 year old female referred by Dr. Pete Wilkins for evaluation of bloating, post-prandial issues, and diarrhea.    ASSESSMENT AND PLAN:    #. Bloating  #. Diarrhea  #. Post-prandial fullness/early satiety  Broad differential which includes H. Pylori, PUD, microscopic colitis, IBD, malabsorption, chronic pancreatitis, FGIDs, and occult malignancy. Red flag/concerning features of her symptom constellation include age, nocturnal diarrhea, PPI refractoriness. Unlikely to be infection other than H. Pylori given chronicity. Endoscopic work up in [place] around 10 years ago reportedly normal.   -- broad lab work up today for pain/diarrhea/bloating, stool H. Pylori antigen  -- upper and lower endoscopy  -- PRN simethicone and TUMs for symptomatic relief at present    PATIENT CONTACT INFORMATION:    [phone] (Mateus Daren- ) she prefers we use this number    [phone] (her cell), would be 2nd contact    Return to Clinic: 6 months    Hermelindo Ferraro MD  Gastroenterology Fellow  Division of Gastroenterology, Hepatology and Nutrition  [facility]      Patient discussed and seen with Gastroenterology staff Dr. Anderson, who is in agreement with the above.     ====================================================================================================================================  HPI:     Ms. Ignacio is a 76 yof that is referred to GI clinic for bloating    She apologizes for her difficulties with English, though notably has not requested an  for  "assistance. I ask permission to proceed and she grants it.     Provides that she has struggled with digestive issues for \"years\", though the last 4-6 months have been particularly bothersome. Her issues are characterized by post-prandial fullness, bloating, and diarrhea. She denies jonah pain, though the bloating is \"tight\" and \"sharp\" after eating meals. It can last for up to 1-2 hours and is epigastric without radiation. She mentions how loud her intestines are as well, worries others can hear them. Significant amount of eructation in post-prandial setting as well. Her diarrhea occurs 3-5 times per day and can wake her from sleep at times. No blood, mucus, or tenesmus occurs.     No dietary triggers she can identify for her bloating or diarrhea.     No new medication starts, travel, or sick contacts.     She had an upper and lower endoscopy in [facility] 10 years ago, and the were reportedly \"normal\".     Has been on and off of PPIs over the last few years, not much benefit with any dose or duration.     Denies hematochezia, melena, dyschezia, fevers, chills, weight loss, dysphagia,     No family history of GI malignancy, IBD, celiac, H. Pylori.     Notably, has been told to be tested for H. Pylori in the past though has never returned the testing kit.     Social:  - EtOH: none  - Tobacco: none  - Living: in [place], [place] with her     Targeted GI review of systems complete and is otherwise negative.     Past Medical History:   Diagnosis Date     Chest pain 4/2014     Depression      Fatigue      Gastritis      Unspecified hypothyroidism     Hypothyroidism       Past Surgical History:   Procedure Laterality Date     CHOLECYSTECTOMY, OPEN         Family History   Problem Relation Age of Onset     Cardiovascular Father         CHF     C.A.D. Father      Cardiovascular Brother         CHF     C.A.D. Brother      Cardiovascular Brother      Cardiovascular Brother      Glaucoma No family hx of      Macular " "Degeneration No family hx of        Social History     Tobacco Use     Smoking status: Never Smoker     Smokeless tobacco: Never Used   Substance Use Topics     Alcohol use: No       Physical exam:     Vitals:BP (!) 141/82   Pulse 108   Ht 1.448 m (4' 9\")   Wt 68.4 kg (150 lb 11.2 oz)   SpO2 96%   BMI 32.61 kg/m     BMI: Body mass index is 32.61 kg/m .      GEN: NAD, A&Ox3, appropriate  HEENT: EOMI, PERRLA, MMM, hearing grossly intact  Neck: full ROM  Cardiopulmonary: non labored, comfortable on RA, nondiaphoretic, no LE edema  Abdominal: soft, nontender, nondistended, no HSM, no rebound, no guarding, normoactive BS  Skin: no jaundice, no gross lesions on visible skin  Neuro: A&Ox3, grossly intact motor/sensation, gait intact  MSK: no gross deformity, moves arms/legs equally  Psych: normal affect, congruent with mood      Labs:   Lab on 02/25/2022   Component Date Value Ref Range Status     Sodium 02/25/2022 139  133 - 144 mmol/L Final     Potassium 02/25/2022 3.7  3.4 - 5.3 mmol/L Final     Chloride 02/25/2022 105  94 - 109 mmol/L Final     Carbon Dioxide (CO2) 02/25/2022 30  20 - 32 mmol/L Final     Anion Gap 02/25/2022 4  3 - 14 mmol/L Final     Urea Nitrogen 02/25/2022 17  7 - 30 mg/dL Final     Creatinine 02/25/2022 0.75  0.52 - 1.04 mg/dL Final     Calcium 02/25/2022 9.3  8.5 - 10.1 mg/dL Final     Glucose 02/25/2022 96  70 - 99 mg/dL Final     Alkaline Phosphatase 02/25/2022 100  40 - 150 U/L Final     AST 02/25/2022 29  0 - 45 U/L Final     ALT 02/25/2022 26  0 - 50 U/L Final     Protein Total 02/25/2022 7.6  6.8 - 8.8 g/dL Final     Albumin 02/25/2022 3.9  3.4 - 5.0 g/dL Final     Bilirubin Total 02/25/2022 0.7  0.2 - 1.3 mg/dL Final     GFR Estimate 02/25/2022 82  >60 mL/min/1.73m2 Final    Effective December 21, 2021 eGFRcr in adults is calculated using the 2021 CKD-EPI creatinine equation which includes age and gender (Alecia et al., NEJ, DOI: 10.1056/DXRRcs1721358)     Lipase 02/25/2022 187  73 - " 393 U/L Final     CRP Inflammation 02/25/2022 <2.9  0.0 - 8.0 mg/L Final     Erythrocyte Sedimentation Rate 02/25/2022 17  0 - 30 mm/hr Final     Cholesterol 02/25/2022 130  <200 mg/dL Final     Triglycerides 02/25/2022 101  <150 mg/dL Final     Direct Measure HDL 02/25/2022 50  >=50 mg/dL Final     LDL Cholesterol Calculated 02/25/2022 60  <=100 mg/dL Final     Non HDL Cholesterol 02/25/2022 80  <130 mg/dL Final     Patient Fasting > 8hrs? 02/25/2022 No   Final     TSH 02/25/2022 3.92  0.40 - 4.00 mU/L Final     Color Urine 02/25/2022 Straw  Colorless, Straw, Light Yellow, Yellow Final     Appearance Urine 02/25/2022 Clear  Clear Final     Glucose Urine 02/25/2022 Negative  Negative mg/dL Final     Bilirubin Urine 02/25/2022 Negative  Negative Final     Ketones Urine 02/25/2022 Negative  Negative mg/dL Final     Specific Gravity Urine 02/25/2022 1.025  1.003 - 1.035 Final     Blood Urine 02/25/2022 Negative  Negative Final     pH Urine 02/25/2022 6.0  5.0 - 7.0 Final     Protein Albumin Urine 02/25/2022 Negative  Negative mg/dL Final     Urobilinogen Urine 02/25/2022 Normal  Normal, 2.0 mg/dL Final     Nitrite Urine 02/25/2022 Negative  Negative Final     Leukocyte Esterase Urine 02/25/2022 Negative  Negative Final     Mucus Urine 02/25/2022 Present (A) None Seen /LPF Final     RBC Urine 02/25/2022 1  <=2 /HPF Final     WBC Urine 02/25/2022 1  <=5 /HPF Final     Squamous Epithelials Urine 02/25/2022 1  <=1 /HPF Final     Transitional Epithelials Urine 02/25/2022 <1  <=1 /HPF Final     Vitamin D, Total (25-Hydroxy) 02/25/2022 66  20 - 75 ug/L Final     WBC Count 02/25/2022 6.6  4.0 - 11.0 10e3/uL Final     RBC Count 02/25/2022 4.73  3.80 - 5.20 10e6/uL Final     Hemoglobin 02/25/2022 13.4  11.7 - 15.7 g/dL Final     Hematocrit 02/25/2022 41.3  35.0 - 47.0 % Final     MCV 02/25/2022 87  78 - 100 fL Final     MCH 02/25/2022 28.3  26.5 - 33.0 pg Final     MCHC 02/25/2022 32.4  31.5 - 36.5 g/dL Final     RDW 02/25/2022  13.4  10.0 - 15.0 % Final     Platelet Count 02/25/2022 171  150 - 450 10e3/uL Final     % Neutrophils 02/25/2022 63  % Final     % Lymphocytes 02/25/2022 23  % Final     % Monocytes 02/25/2022 9  % Final     % Eosinophils 02/25/2022 4  % Final     % Basophils 02/25/2022 1  % Final     % Immature Granulocytes 02/25/2022 0  % Final     NRBCs per 100 WBC 02/25/2022 0  <1 /100 Final     Absolute Neutrophils 02/25/2022 4.1  1.6 - 8.3 10e3/uL Final     Absolute Lymphocytes 02/25/2022 1.6  0.8 - 5.3 10e3/uL Final     Absolute Monocytes 02/25/2022 0.6  0.0 - 1.3 10e3/uL Final     Absolute Eosinophils 02/25/2022 0.3  0.0 - 0.7 10e3/uL Final     Absolute Basophils 02/25/2022 0.0  0.0 - 0.2 10e3/uL Final     Absolute Immature Granulocytes 02/25/2022 0.0  <=0.4 10e3/uL Final     Absolute NRBCs 02/25/2022 0.0  10e3/uL Final           Relevant imaging:   RUQ U/S: 2/25/22    INDICATION: Abdominal pain     COMPARISON: None     FINDINGS: Cholecystectomy. Liver appears normal, 12.1 cm in length.  The included abdominal aorta and IVC appear normal. Portal vein color  flow and Doppler waveforms normal. Common bile duct 5 mm. 5 x 4 x 3 mm  right renal cyst laterally. Renal length 10.1 cm. Left kidney appears  normal with length 9.6 cm. No ascites. Spleen appears normal, 7.8 cm  in maximum length. Pancreas is partially obscured by bowel gas  blocking.                                                                      IMPRESSION: Cholecystectomy. 4 mm mean diameter right renal cyst.      Endoscopy:  No history of endoscopy.     Pathology:   N/A    Relevant surgical reports:   N/A          Attestation signed by Trent Anderson MD at 5/17/2022 11:35 AM:  ATTENDING ATTESTATION:     DATE SEEN: 5/3/22    Patient was discussed, seen, and examined by Ternt ortiz. The plan of care and pertinent data/imaging were also reviewed with the GI Fellow. Agree with the above assessment and plan with following additions:     Bloating,  loose stools and early satiety. Proceed with lab evaluation, EGD and colonoscopy. Also check stool h pylori antigen. Continue symptomatic management with simethicone and tums. Follow up after studies have been completed.     Please contact me with any further questions.    Trent Anderson MD    [facility]  Division of Gastroenterology, Hepatology and Nutrition      Sincerely,    Hermelindo Ferraro MD

## 2022-05-03 NOTE — NURSING NOTE
"Chief Complaint   Patient presents with     New Patient       Vitals:    05/03/22 1455   BP: (!) 141/82   Pulse: 108   SpO2: 96%   Weight: 68.4 kg (150 lb 11.2 oz)   Height: 1.448 m (4' 9\")       Body mass index is 32.61 kg/m .    Edna Dexter CMA    "

## 2022-05-03 NOTE — PROGRESS NOTES
"[facility]:     New Consult: abdominal fullness, bloating, diarrhea    Date of visit: 5/3/2022     Referring provider: Dr. Pete Wilkins    CC: 76 year old female referred by Dr. Pete Wilkins for evaluation of bloating, post-prandial issues, and diarrhea.    ASSESSMENT AND PLAN:    #. Bloating  #. Diarrhea  #. Post-prandial fullness/early satiety  Broad differential which includes H. Pylori, PUD, microscopic colitis, IBD, malabsorption, chronic pancreatitis, FGIDs, and occult malignancy. Red flag/concerning features of her symptom constellation include age, nocturnal diarrhea, PPI refractoriness. Unlikely to be infection other than H. Pylori given chronicity. Endoscopic work up in [place] around 10 years ago reportedly normal.   -- broad lab work up today for pain/diarrhea/bloating, stool H. Pylori antigen  -- upper and lower endoscopy  -- PRN simethicone and TUMs for symptomatic relief at present    PATIENT CONTACT INFORMATION:    [phone] (Mateus Daren- ) she prefers we use this number    [phone] (her cell), would be 2nd contact    Return to Clinic: 6 months    Hermelindo Ferraro MD  Gastroenterology Fellow  Division of Gastroenterology, Hepatology and Nutrition  [facility]      Patient discussed and seen with Gastroenterology staff Dr. Anderson, who is in agreement with the above.     ====================================================================================================================================  HPI:     Ms. Ignacio is a 76 yof that is referred to GI clinic for bloating    She apologizes for her difficulties with English, though notably has not requested an  for assistance. I ask permission to proceed and she grants it.     Provides that she has struggled with digestive issues for \"years\", though the last 4-6 months have been particularly bothersome. Her issues are characterized by post-prandial fullness, bloating, and diarrhea. She " "denies jonah pain, though the bloating is \"tight\" and \"sharp\" after eating meals. It can last for up to 1-2 hours and is epigastric without radiation. She mentions how loud her intestines are as well, worries others can hear them. Significant amount of eructation in post-prandial setting as well. Her diarrhea occurs 3-5 times per day and can wake her from sleep at times. No blood, mucus, or tenesmus occurs.     No dietary triggers she can identify for her bloating or diarrhea.     No new medication starts, travel, or sick contacts.     She had an upper and lower endoscopy in [facility] 10 years ago, and the were reportedly \"normal\".     Has been on and off of PPIs over the last few years, not much benefit with any dose or duration.     Denies hematochezia, melena, dyschezia, fevers, chills, weight loss, dysphagia,     No family history of GI malignancy, IBD, celiac, H. Pylori.     Notably, has been told to be tested for H. Pylori in the past though has never returned the testing kit.     Social:  - EtOH: none  - Tobacco: none  - Living: in [place], [place] with her     Targeted GI review of systems complete and is otherwise negative.     Past Medical History:   Diagnosis Date     Chest pain 4/2014     Depression      Fatigue      Gastritis      Unspecified hypothyroidism     Hypothyroidism       Past Surgical History:   Procedure Laterality Date     CHOLECYSTECTOMY, OPEN         Family History   Problem Relation Age of Onset     Cardiovascular Father         CHF     C.A.D. Father      Cardiovascular Brother         CHF     C.A.D. Brother      Cardiovascular Brother      Cardiovascular Brother      Glaucoma No family hx of      Macular Degeneration No family hx of        Social History     Tobacco Use     Smoking status: Never Smoker     Smokeless tobacco: Never Used   Substance Use Topics     Alcohol use: No       Physical exam:     Vitals:BP (!) 141/82   Pulse 108   Ht 1.448 m (4' 9\")   Wt 68.4 kg (150 lb 11.2 " oz)   SpO2 96%   BMI 32.61 kg/m     BMI: Body mass index is 32.61 kg/m .      GEN: NAD, A&Ox3, appropriate  HEENT: EOMI, PERRLA, MMM, hearing grossly intact  Neck: full ROM  Cardiopulmonary: non labored, comfortable on RA, nondiaphoretic, no LE edema  Abdominal: soft, nontender, nondistended, no HSM, no rebound, no guarding, normoactive BS  Skin: no jaundice, no gross lesions on visible skin  Neuro: A&Ox3, grossly intact motor/sensation, gait intact  MSK: no gross deformity, moves arms/legs equally  Psych: normal affect, congruent with mood      Labs:   Lab on 02/25/2022   Component Date Value Ref Range Status     Sodium 02/25/2022 139  133 - 144 mmol/L Final     Potassium 02/25/2022 3.7  3.4 - 5.3 mmol/L Final     Chloride 02/25/2022 105  94 - 109 mmol/L Final     Carbon Dioxide (CO2) 02/25/2022 30  20 - 32 mmol/L Final     Anion Gap 02/25/2022 4  3 - 14 mmol/L Final     Urea Nitrogen 02/25/2022 17  7 - 30 mg/dL Final     Creatinine 02/25/2022 0.75  0.52 - 1.04 mg/dL Final     Calcium 02/25/2022 9.3  8.5 - 10.1 mg/dL Final     Glucose 02/25/2022 96  70 - 99 mg/dL Final     Alkaline Phosphatase 02/25/2022 100  40 - 150 U/L Final     AST 02/25/2022 29  0 - 45 U/L Final     ALT 02/25/2022 26  0 - 50 U/L Final     Protein Total 02/25/2022 7.6  6.8 - 8.8 g/dL Final     Albumin 02/25/2022 3.9  3.4 - 5.0 g/dL Final     Bilirubin Total 02/25/2022 0.7  0.2 - 1.3 mg/dL Final     GFR Estimate 02/25/2022 82  >60 mL/min/1.73m2 Final    Effective December 21, 2021 eGFRcr in adults is calculated using the 2021 CKD-EPI creatinine equation which includes age and gender (Alecia smith al., NEJM, DOI: 10.1056/BJRCgb1137365)     Lipase 02/25/2022 187  73 - 393 U/L Final     CRP Inflammation 02/25/2022 <2.9  0.0 - 8.0 mg/L Final     Erythrocyte Sedimentation Rate 02/25/2022 17  0 - 30 mm/hr Final     Cholesterol 02/25/2022 130  <200 mg/dL Final     Triglycerides 02/25/2022 101  <150 mg/dL Final     Direct Measure HDL 02/25/2022 50  >=50  mg/dL Final     LDL Cholesterol Calculated 02/25/2022 60  <=100 mg/dL Final     Non HDL Cholesterol 02/25/2022 80  <130 mg/dL Final     Patient Fasting > 8hrs? 02/25/2022 No   Final     TSH 02/25/2022 3.92  0.40 - 4.00 mU/L Final     Color Urine 02/25/2022 Straw  Colorless, Straw, Light Yellow, Yellow Final     Appearance Urine 02/25/2022 Clear  Clear Final     Glucose Urine 02/25/2022 Negative  Negative mg/dL Final     Bilirubin Urine 02/25/2022 Negative  Negative Final     Ketones Urine 02/25/2022 Negative  Negative mg/dL Final     Specific Gravity Urine 02/25/2022 1.025  1.003 - 1.035 Final     Blood Urine 02/25/2022 Negative  Negative Final     pH Urine 02/25/2022 6.0  5.0 - 7.0 Final     Protein Albumin Urine 02/25/2022 Negative  Negative mg/dL Final     Urobilinogen Urine 02/25/2022 Normal  Normal, 2.0 mg/dL Final     Nitrite Urine 02/25/2022 Negative  Negative Final     Leukocyte Esterase Urine 02/25/2022 Negative  Negative Final     Mucus Urine 02/25/2022 Present (A) None Seen /LPF Final     RBC Urine 02/25/2022 1  <=2 /HPF Final     WBC Urine 02/25/2022 1  <=5 /HPF Final     Squamous Epithelials Urine 02/25/2022 1  <=1 /HPF Final     Transitional Epithelials Urine 02/25/2022 <1  <=1 /HPF Final     Vitamin D, Total (25-Hydroxy) 02/25/2022 66  20 - 75 ug/L Final     WBC Count 02/25/2022 6.6  4.0 - 11.0 10e3/uL Final     RBC Count 02/25/2022 4.73  3.80 - 5.20 10e6/uL Final     Hemoglobin 02/25/2022 13.4  11.7 - 15.7 g/dL Final     Hematocrit 02/25/2022 41.3  35.0 - 47.0 % Final     MCV 02/25/2022 87  78 - 100 fL Final     MCH 02/25/2022 28.3  26.5 - 33.0 pg Final     MCHC 02/25/2022 32.4  31.5 - 36.5 g/dL Final     RDW 02/25/2022 13.4  10.0 - 15.0 % Final     Platelet Count 02/25/2022 171  150 - 450 10e3/uL Final     % Neutrophils 02/25/2022 63  % Final     % Lymphocytes 02/25/2022 23  % Final     % Monocytes 02/25/2022 9  % Final     % Eosinophils 02/25/2022 4  % Final     % Basophils 02/25/2022 1  % Final      % Immature Granulocytes 02/25/2022 0  % Final     NRBCs per 100 WBC 02/25/2022 0  <1 /100 Final     Absolute Neutrophils 02/25/2022 4.1  1.6 - 8.3 10e3/uL Final     Absolute Lymphocytes 02/25/2022 1.6  0.8 - 5.3 10e3/uL Final     Absolute Monocytes 02/25/2022 0.6  0.0 - 1.3 10e3/uL Final     Absolute Eosinophils 02/25/2022 0.3  0.0 - 0.7 10e3/uL Final     Absolute Basophils 02/25/2022 0.0  0.0 - 0.2 10e3/uL Final     Absolute Immature Granulocytes 02/25/2022 0.0  <=0.4 10e3/uL Final     Absolute NRBCs 02/25/2022 0.0  10e3/uL Final           Relevant imaging:   RUQ U/S: 2/25/22    INDICATION: Abdominal pain     COMPARISON: None     FINDINGS: Cholecystectomy. Liver appears normal, 12.1 cm in length.  The included abdominal aorta and IVC appear normal. Portal vein color  flow and Doppler waveforms normal. Common bile duct 5 mm. 5 x 4 x 3 mm  right renal cyst laterally. Renal length 10.1 cm. Left kidney appears  normal with length 9.6 cm. No ascites. Spleen appears normal, 7.8 cm  in maximum length. Pancreas is partially obscured by bowel gas  blocking.                                                                      IMPRESSION: Cholecystectomy. 4 mm mean diameter right renal cyst.      Endoscopy:  No history of endoscopy.     Pathology:   N/A    Relevant surgical reports:   N/A

## 2022-05-04 ENCOUNTER — PRE VISIT (OUTPATIENT)
Dept: GASTROENTEROLOGY | Facility: CLINIC | Age: 77
End: 2022-05-04

## 2022-05-04 LAB
DEPRECATED CALCIDIOL+CALCIFEROL SERPL-MC: 57 UG/L (ref 20–75)
GLIADIN IGA SER-ACNC: 1.5 U/ML
GLIADIN IGG SER-ACNC: <0.6 U/ML
IGA SERPL-MCNC: 225 MG/DL (ref 84–499)
TTG IGA SER-ACNC: 0.9 U/ML
TTG IGG SER-ACNC: <0.6 U/ML

## 2022-05-10 ENCOUNTER — TELEPHONE (OUTPATIENT)
Dept: OPHTHALMOLOGY | Facility: CLINIC | Age: 77
End: 2022-05-10
Payer: COMMERCIAL

## 2022-05-10 NOTE — TELEPHONE ENCOUNTER
[facility] Call Center    Phone Message    May a detailed message be left on voicemail: yes     Reason for Call: Other: Pt spouse called to say Pt eyeglass Rx doesn't help at all. They just got her glasses and she is upset that she still can't see. Please call to discuss. Thank you.      Action Taken: Message routed to:  [facility] EYE    Travel Screening: Not Applicable

## 2022-05-12 ENCOUNTER — TELEPHONE (OUTPATIENT)
Dept: OPHTHALMOLOGY | Facility: CLINIC | Age: 77
End: 2022-05-12
Payer: COMMERCIAL

## 2022-05-12 NOTE — TELEPHONE ENCOUNTER
Spoke with both patient and  regarding concerns with glasses Rx not making much of an improvement with vision.   I mentioned the Cataracts were discussed visit last month and it was mentioned addressing to improve vision as one eye only best corrected vision was 20/50. Holding on this was the plan after that visit. I stated we could set up appt to for Cataract Evaluation.    felt he has been able to hold off on his Cataract surgery for 5 year with still having glasses improve I told him everyone is different and that's not always the case to improve vision and Cataract can progress different for everyone.   He would like to revisit looking at glasses Rx. I told him we can do that and schedule appt with Dr Mckinney I will have Ernestina wesley out to set up that visit.   Glasses Check/ Possible Cataract Evaluation. Intial call was made to M number on file and connection was difficult to hear patient and wife. Communication better on the secondary number patient offered.   Patient requested call back at [phone] ( afternoon apts best for pt)   SARAH Barrios COT 10:18 AM May 12, 2022

## 2022-05-12 NOTE — TELEPHONE ENCOUNTER
Spoke with patient's spouse regarding scheduling for a Glasses Check/ Possible Cataract Evaluation. Scheduled patient accordingly for an afternoon appointment as all morning appointments were declined by spouse. Sent appointment letter to Home address and added patient to the wait-list.-Per Patient's spouse

## 2022-05-24 ENCOUNTER — HOSPITAL ENCOUNTER (OUTPATIENT)
Facility: AMBULATORY SURGERY CENTER | Age: 77
End: 2022-05-24
Attending: INTERNAL MEDICINE | Admitting: INTERNAL MEDICINE
Payer: COMMERCIAL

## 2022-05-24 ENCOUNTER — TELEPHONE (OUTPATIENT)
Dept: GASTROENTEROLOGY | Facility: CLINIC | Age: 77
End: 2022-05-24
Payer: COMMERCIAL

## 2022-05-24 NOTE — TELEPHONE ENCOUNTER
Screening Questions  BlueKIND OF PREP RedLOCATION [review exclusion criteria] GreenSEDATION TYPE  1. Have you had a positive covid test in the last 90 days? N    2. Do you have a legal guardian or medical Power of ?  Are you able to give consent for your medical care?Yes (Sedation review/consideration needed)    3. Are you active on mychart? N    4. What insurance is in the chart? [employer]    3.   Ordering/Referring Provider: Justin    4. BMI 32.6 [BMI OVER 40-EXTENDED PREP]  If greater than 40 review exclusion criteria [PAC APPT IF @ [facility]]        5.  Respiratory Screening :  [If yes to any of the following HOSPITAL setting only]     Do you use daily home oxygen? N    Do you have mod to severe Obstructive Sleep Apnea? N  [facility]]   Do you have Pulmonary Hypertension? N     Do you have UNCONTROLLED asthma? N        6.   Have you had a heart or lung transplant? N      7.   Are you currently on dialysis? N [ If yes, G-PREP & HOSPITAL setting only]     8.   Do you have chronic kidney disease? N [ If yes, G-PREP ]    9.   Have you had a stroke or Transient ischemic attack (TIA - aka  mini stroke ) within 6 months?  N (If yes, please review exclusion criteria)    10.   In the past 6 months, have you had any heart related issues including cardiomyopathy or heart attack? N           If yes, did it require cardiac stenting or other implantable device? N      11.   Do you have any implantable devices in your body (pacemaker, defib, LVAD)? N (If yes, please review exclusion criteria)    12.   Do you take nitroglycerin? N           If yes, how often? NA  (if yes, HOSPITAL setting ONLY)    13.   Are you currently taking any blood thinners? N          [IF YES, INFORM PATIENT TO FOLLOW UP W/ ORDERING PROVIDER FOR BRIDGING INSTRUCTIONS]     14.   Do you have a diagnosis of diabetes? N   [ If yes, G-PREP ]    15.   [FEMALES] Are you currently pregnant? NA    If yes, how many weeks? NA    16.   Are you  taking any prescription pain medications on a routine schedule?  N  [ If yes, EXTENDED PREP.] [If yes, MAC]    17.   Do you have any chemical dependencies such as alcohol, street drugs, or methadone?  N [If yes, MAC]    18.   Do you have any history of post-traumatic stress syndrome, severe anxiety or history of psychosis?  N  [If yes, MAC]    19.   Do you transfer independently?  Yes    20.  On a regular basis do you go 3-5 days between bowel movements? N   [ If yes, EXTENDED PREP.]    21.   Preferred LOCAL Pharmacy for Pre Prescription   [facility] [address] [place] [address]      Scheduling Details      Caller : Karin-  (Please ask for phone number if not scheduled by patient)    Type of Procedure Scheduled: Colon/EGD  Which Colonoscopy Prep was Sent?: Miralax  KHORUTS CF PATIENTS & GROEN'S PATIENTS NEEDS EXTENDED PREP  Surgeon: Justin  Date of Procedure: 8/1/22  Location: [facility] [place]      Sedation Type: MAC  Conscious Sedation- Needs  for 6 hours after the procedure  MAC/General-Needs  for 24 hours after procedure    Pre-op Required at [facility] [place], [place] and OR for MAC sedation: NA  (advise patient they will need a pre-op prior to procedure -)      Informed patient they will need an adult  Yes  Cannot take any type of public or medical transportation alone    Pre-Procedure [facility] or Externally: [place] 7/28/22    Confirmed Nurse will call to complete assessment Yes    Additional comments:

## 2022-06-03 ENCOUNTER — TELEPHONE (OUTPATIENT)
Dept: GASTROENTEROLOGY | Facility: CLINIC | Age: 77
End: 2022-06-03
Payer: COMMERCIAL

## 2022-06-03 NOTE — TELEPHONE ENCOUNTER
Caller: Karin Shelley     Procedure: Colon    Date, Location, and Surgeon of Procedure Cancelled: 8/1, Justin SOW    Ordering Provider: Justin    Reason for cancel (please be detailed, any staff messages or encounters to note?):  of pt stated pt is experiencing increased GI symptoms and requested for a sooner date.        Rescheduled: Yes     If rescheduled:    Date: 7/25   [place] [facility]   Prep Resent: Y(changes to prep?)   Covid Test Rescheduled: N   Note any change or update to original order/sedation: N/a

## 2022-07-15 ENCOUNTER — TELEPHONE (OUTPATIENT)
Dept: GASTROENTEROLOGY | Facility: CLINIC | Age: 77
End: 2022-07-15

## 2022-07-15 NOTE — TELEPHONE ENCOUNTER
Attempted to contact patient regarding upcoming colonoscopy/EGD procedure on 7.25.2022 for pre assessment questions. Patient is unable to talk at this time.    Discuss at home rapid antigen COVID test 1-2 days prior to procedure.    Arrival time: 1330    Facility location: [facility]    Sedation type: MAC    Indication for procedure: chronic post-prandial bloating, pain, diarrhea    Anticoagulants: Eliquis. Holding interval of 2 days    Bowel prep recommendation: Miralax/Magnesium citrate/Dulcolax    Heidi Sorensen RN

## 2022-07-15 NOTE — TELEPHONE ENCOUNTER
Hello,     We wanted to reach out because your patient is having a Colonoscopy/EGD on July / 25 / 2022. The Gastroenterology Service Line is advising that their Eliquis be held for 2 days prior to their appointment.     We have advised the patient of these hold recommendations and to follow up with their prescribing/managing provider.     Please contact us if this is NOT advised, please provider alternative recommendations for hold intervals.     Thank you,     Pre-Assessment Endoscopy Nursing Team  P Endoscopy Nurse Ronald ([id number])

## 2022-07-18 ENCOUNTER — TELEPHONE (OUTPATIENT)
Dept: GASTROENTEROLOGY | Facility: CLINIC | Age: 77
End: 2022-07-18

## 2022-07-18 NOTE — TELEPHONE ENCOUNTER
Patient requested we contact her .  Pt gave 's work number listed in chart to contact.    Second attempt for pre-assessment prior to upcoming colonoscopy/EGD.    No answer.  Left message to return call [phone] #3    Heidi Sorensen RN

## 2022-07-18 NOTE — TELEPHONE ENCOUNTER
Caller: Karin Shelley     Procedure: Colon    Date, Location, and Surgeon of Procedure Cancelled: 7/25, Justin [facility]     Ordering Provider: Trent Anderson MD     Reason for cancel (please be detailed, any staff messages or encounters to note?): [place]        Rescheduled: N

## 2022-07-27 ENCOUNTER — TELEPHONE (OUTPATIENT)
Dept: GASTROENTEROLOGY | Facility: CLINIC | Age: 77
End: 2022-07-27

## 2022-07-27 ENCOUNTER — HOSPITAL ENCOUNTER (OUTPATIENT)
Facility: AMBULATORY SURGERY CENTER | Age: 77
End: 2022-07-27
Attending: INTERNAL MEDICINE | Admitting: INTERNAL MEDICINE
Payer: COMMERCIAL

## 2022-07-27 NOTE — TELEPHONE ENCOUNTER
Caller: BRANT     Procedure: DOUBLE    Date, Location, and Surgeon of Procedure Cancelled: 8/1 YASMIN [facility]    Ordering Provider:BLANCA HOFFMAN    Reason for cancel (please be detailed, any staff messages or encounters to note?): NOT FEELING WELL        Rescheduled: Y     If rescheduled:    Date: 10/4   Location: [facility]   Prep Resent: Y(changes to prep?)   Covid Test Rescheduled: Y   Note any change or update to original order/sedation: 3

## 2022-09-23 ENCOUNTER — TELEPHONE (OUTPATIENT)
Dept: GASTROENTEROLOGY | Facility: CLINIC | Age: 77
End: 2022-09-23

## 2022-09-23 DIAGNOSIS — R19.7 DIARRHEA: Primary | ICD-10-CM

## 2022-09-23 RX ORDER — BISACODYL 5 MG
TABLET, DELAYED RELEASE (ENTERIC COATED) ORAL
Qty: 4 TABLET | Refills: 0 | Status: CANCELLED | OUTPATIENT
Start: 2022-09-23

## 2022-09-23 NOTE — TELEPHONE ENCOUNTER
Called the Pt to discuss below. No answer, LM.     Patient scheduled for EGD/Colonoscopy on 10/4/22.     Discuss at home test option.     Arrival time: 0800    Facility location: [facility] [place] [facility]    Sedation type: MAC    Indication for procedure: EGD: chronic post-prandial bloating, pain  Colonoscopy: chronic bloating, diarrhea    Anticoagulations? Eliquis Holding interval of 2 days    Bowel prep recommendation: Will need to discuss with the Pt. Constipation is listed on problems list, but colonoscopy order states diarrhea.     Will hold off on prescription and Mychart message until we discuss with the Pt.     Pre visit planning completed.    Korin Harper RN

## 2022-09-26 NOTE — TELEPHONE ENCOUNTER
Called the Pt to discuss below. No answer, LM. Unable to send [employer] message as the Pt is not active.     Korin Harper RN   -[facility] Endoscopy

## 2022-09-28 RX ORDER — BISACODYL 5 MG/1
TABLET, DELAYED RELEASE ORAL
Qty: 4 TABLET | Refills: 0 | Status: SHIPPED | OUTPATIENT
Start: 2022-09-28 | End: 2024-01-05

## 2022-09-28 NOTE — TELEPHONE ENCOUNTER
Called the Pt to discuss below. No answer. Left message.     Will go ahead and send in standard GoLytely as the Pt denies constipation at scheduling intake call and order state diarrhea.     Korin Harper RN   -[facility] Endoscopy

## 2022-10-03 RX ORDER — LIDOCAINE 40 MG/G
CREAM TOPICAL
Status: CANCELLED | OUTPATIENT
Start: 2022-10-03

## 2022-10-03 RX ORDER — ONDANSETRON 2 MG/ML
4 INJECTION INTRAMUSCULAR; INTRAVENOUS
Status: CANCELLED | OUTPATIENT
Start: 2022-10-03

## 2022-10-03 NOTE — TELEPHONE ENCOUNTER
"Third attempt for pre-assessment prior to upcoming colonoscopy.    No answer.  Unable to leave a message.  Per chart notes  \"she would like her  to be called to assist in scheduling appointments and procedures.\"    RN attempted to contact pt's .  No answer.  Left message to return call [phone] #4    Heidi Sorensen RN    "

## 2022-10-06 ENCOUNTER — TELEPHONE (OUTPATIENT)
Dept: GASTROENTEROLOGY | Facility: CLINIC | Age: 77
End: 2022-10-06

## 2022-10-06 NOTE — TELEPHONE ENCOUNTER
CANCEL - RESCHEDULE    Ordering Provider: Trent Anderson MD  Procedure Cancelled: EGD/COLON  Procedure Date Cancelled: 10/04  Surgeon of Procedure Cancelled: YASMIN  Sedation type: MAC   Location of Procedure Cancelled: [facility]      Rescheduled: NO - NUMBER WAS BUSY AND NO VM TO LEAVE MESSAGE     If rescheduled:    Date: --   Location: --   Sedation Type: MAC   PAC / Pre-op Required  --   PREP TYPE: --   Covid Test [[facility] - PCR IN FACILITY] [facility]   Note any change or update to original order/sedation: --   Preferred LOCAL Pharmacy for Pre Prescription  [facility] [address]       Reason for cancel (please be detailed, any staff messages or encounters to note? SCHED ERROR? FACILITY/ SEDATION CHANGE? ):     ----- Message from Lay Chadwick RN sent at 10/4/2022  9:19 AM CDT -----  Patient no show today, please reschedule           Details and Prep sent via --     Additional details:

## 2022-10-10 ENCOUNTER — TELEPHONE (OUTPATIENT)
Dept: GASTROENTEROLOGY | Facility: CLINIC | Age: 77
End: 2022-10-10

## 2022-10-10 NOTE — TELEPHONE ENCOUNTER
Caller: geremias    Procedure: double    Date, Location, and Surgeon of Procedure Cancelled: 10/4 YASMIN    Ordering Provider:Kit Santillan Endoscopy Scheduling Pool  TRY TO REACH PT AGAIN TO R/S       THANKS           Previous Messages     ----- Message -----   From: Lay Chadwick, CARLO   Sent: 10/4/2022   9:20 AM CDT   To: Endoscopy Scheduling Pool     Patient no show today, please reschedule        Reason for CALL: Kit Santillan Endoscopy Scheduling Pool  TRY TO REACH PT AGAIN TO R/S       THANKS      ----- Message -----   From: Lay Chadwick RN   Sent: 10/4/2022   9:20 AM CDT   To: Endoscopy Scheduling Pool     Patient no show today, please reschedule        Rescheduled: NO LVM TO CALL BACK TO RESCHEDULE

## 2022-10-24 ENCOUNTER — TELEPHONE (OUTPATIENT)
Dept: GASTROENTEROLOGY | Facility: CLINIC | Age: 77
End: 2022-10-24

## 2022-10-24 NOTE — TELEPHONE ENCOUNTER
Called to remind patient of their upcoming appointment with our GI clinic, on Tuesday 11/1/2022 at 3:45PM with Dr. Ferraro. This appointment is scheduled as an in-person appt. Please arrive 15 minutes early to check in for your appointment. , if your appointment is virtual (video or telephone) you need to be in [place] for the visit. To reschedule or cancel patient to call [phone].    Sayra Jama MA

## 2022-10-27 ENCOUNTER — TELEPHONE (OUTPATIENT)
Dept: GASTROENTEROLOGY | Facility: CLINIC | Age: 77
End: 2022-10-27

## 2022-10-27 NOTE — TELEPHONE ENCOUNTER
Called to remind patient of their upcoming appointment with our GI clinic, on Tuesday 11/1/2022 at 3:45PM with Dr. Ferraro. This appointment is scheduled as an in-person appt. Please arrive 15 minutes early to check in for your appointment. , if your appointment is virtual (video or telephone) you need to be in [place] for the visit. To reschedule or cancel patient to call [phone].     Sayra Jama MA

## 2023-01-04 ENCOUNTER — VIRTUAL VISIT (OUTPATIENT)
Dept: INTERNAL MEDICINE | Facility: CLINIC | Age: 78
End: 2023-01-04
Payer: COMMERCIAL

## 2023-01-04 DIAGNOSIS — R19.7 DIARRHEA, UNSPECIFIED TYPE: ICD-10-CM

## 2023-01-04 DIAGNOSIS — M85.80 OSTEOPENIA, UNSPECIFIED LOCATION: ICD-10-CM

## 2023-01-04 DIAGNOSIS — H57.9 ITCH OF EYE: Primary | ICD-10-CM

## 2023-01-04 DIAGNOSIS — R10.13 EPIGASTRIC PAIN: ICD-10-CM

## 2023-01-04 DIAGNOSIS — Z78.0 POST-MENOPAUSAL: ICD-10-CM

## 2023-01-04 PROCEDURE — 99214 OFFICE O/P EST MOD 30 MIN: CPT | Mod: 95 | Performed by: INTERNAL MEDICINE

## 2023-01-04 RX ORDER — SUCRALFATE 1 G/1
1 TABLET ORAL 4 TIMES DAILY
Qty: 120 TABLET | Refills: 1 | Status: SHIPPED | OUTPATIENT
Start: 2023-01-04 | End: 2024-01-05

## 2023-01-04 NOTE — PROGRESS NOTES
Bobby is a 77 year old who is being evaluated via a billable video visit.      Pt is in [place]    How would you like to obtain your AVS? MyChart  If the video visit is dropped, the invitation should be resent by: Text to cell phone:[phone]  Will anyone else be joining your video visit? No    Shyla KHAN    Video-Visit Details    Type of service:  Video Visit   Video Start Time: 5:08 PM  Video End Time:5:32 PM    Originating Location (pt. Location): Home  Distant Location (provider location):  On-site  Platform used for Video Visit: [facility]    Assessment & Plan     Itch of eye    She presents with months of eye pruritis and drainage which could be allergic.  Will try ketotifen drops as below and recommend she contact [facility] to schedule follow-up with her eye doctor as well.     - ketotifen (ZADITOR) 0.025 % ophthalmic solution; Place 1 drop into both eyes 2 times daily    Epigastric pain  and  Diarrhea, unspecified type    Bobby presents with years of abdominal pain and diarrhea.  Chart review shows she may have been diagnosed with IBS in the past, but that colonoscopy and EGD were ordered last year but not completed- I think it would be reasonable to proceed with these tests.  She has tried H2B, PPI, Gas-X, and Imodium with either no relief or partial relief.  We'll try adding in sucralfate to see if that helps.      - Adult GI  Referral - Procedure Only; Future  - sucralfate (CARAFATE) 1 GM tablet; Take 1 tablet (1 g) by mouth 4 times daily    Post-menopausal  and  Osteopenia, unspecified location    Last DEXA in 2017 showed osteopenia, recommend recheck    - DX Hip/Pelvis/Spine; Future      KarinaLexy Villanueva MD  [facility] [place]    Subjective   Bobby is a 77 year old accompanied by her son, presenting for the following health issues:  Video Visit (Stomach ache, sinus, cold)      HPI     Acute Illness  Acute illness concerns: Stomach ache and eye  symtpoms  Onset/Duration: stomach ache: years, worsening since about 2 weeks ago.  Eyes for months   Symptoms:  Fever: No  Chills/Sweats: No  Headache (location?): No  Sinus Pressure: No  Conjunctivitis:  YES- itchy and watering a lot, she had cataract surgery at [facility]. She called about this to [facility] in November but wasn't set up with appointment.  She has used dry eye drops - sometimes helps and sometimes doesn't  Ear Pain: no  Rhinorrhea: YES  Congestion: No  Sore Throat: No  Cough: no  Abdominal pain is epigastric, sometimes constant but mostly comes and goes, having back pain  Decreased Appetite: No  Nausea: No  Vomiting: No  Diarrhea: YES- 3-4 times per day, no blood    She was previously schedule for an EGD and colonoscopy but this was cancelled as she was out of town.      Therapies tried and outcome: She has tried PPIs in the past without relief, and she seemed to have hair loss on these.  She also tried Pepcid complete and has been taking this recently- and sometimes that helped but sometimes not.  Imodium- sometimes helps and sometimes doesn't.  Gas-X helps some      Review of Systems   Constitutional, HEENT, pulmonary, gi systems are negative, except as otherwise noted.      Objective    There were no vitals taken for this visit.  There is no height or weight on file to calculate BMI.  Physical Exam   GENERAL: healthy, alert and no distress  PSYCH: mentation appears normal, affect normal/bright

## 2023-01-04 NOTE — PATIENT INSTRUCTIONS
DEXA bone density scan was ordered.  Please call [phone] to schedule.     Thank you for visiting the Primary Care Center today at the [facility]! The following is some information about our clinic:     Primary Care Center Frequently-Asked Questions    (1) How do I schedule appointments at the [facility]?     Primary Care--to schedule or make changes to an existing appointment, please call our primary care line at [phone].    Labs--to schedule a lab appointment at the [facility] you can use [employer] or call [phone]. If you have a [place] location that is closer to home, you can reach out to that location for scheduling options.     Imaging--if you need to schedule a CT, X-ray, MRI, ultrasound, or other imaging study you can call [phone] to schedule at the [facility] or any other [facility] imaging location.     Referrals--if a referral to another specialty was ordered you can expect a phone call from their scheduling team. If you have not heard from them in a week, please call us or send us a [employer] message to check the status or get a scheduling number. Please note that this only applies to internal [facility] referrals. If the referral is external you would need to contact their office for scheduling.     (2) I have a question about my visit, who do I contact?     You can call us at the primary care line at [phone] to ask questions about your visit. You can also send a secure message through [employer], which is reviewed by clinic staff. Please note that [employer] messages have a twenty-four to forty-eight business hour turnaround time and should not be used for urgent concerns.    (3) How will I get the results of my tests?    If you are signed up for [employer] all tests will be released to you within twenty-four hours of resulting. Please allow three to five days for your doctor to review your results and place a note  interpreting the results. If you do not have [employer] you will receive your results through mail seven to ten business days following the return of the tests. Please note that if there should be any urgent or concerning results that your doctor or their registered nurse will reach out to you the same day as the tests come back. If you have follow up questions about your results or would like to discuss the results in detail please schedule a follow up with your provider either in person or virtually.     (4) How do I get refills of my prescriptions?     You should always first contact your pharmacy for refills of your medications. If submitting a refill request on [employer], please be sure to submit the request only once--repeat requests can cause delays in refill. If you are requesting a NEW medication or a medication related to new symptoms you will need to schedule an appointment with a provider prior to approval. Please note: Routine medication refills have up to one to three business day turnaround whereas controlled substances refills have up to five to seven business day turnaround.    (5) I have new symptoms, what do I do?     If you are having an immediate medical emergency, you should dial 911 for assistance.   For anything urgent that needs to be seen within a few hours to one day you should visit a local urgent care for assistance.  For non-urgent symptoms that need to be seen within a few days to a week you can schedule with an available provider in primary care by going to [email or web address] or calling [phone].   If you are not sure how serious your symptoms are or you would like to receive medical advice you can always call [phone] to speak with a triage nurse.

## 2023-01-12 ENCOUNTER — HOSPITAL ENCOUNTER (OUTPATIENT)
Facility: CLINIC | Age: 78
End: 2023-01-12
Attending: INTERNAL MEDICINE | Admitting: INTERNAL MEDICINE
Payer: COMMERCIAL

## 2023-01-12 ENCOUNTER — TELEPHONE (OUTPATIENT)
Dept: GASTROENTEROLOGY | Facility: CLINIC | Age: 78
End: 2023-01-12

## 2023-01-12 NOTE — TELEPHONE ENCOUNTER
PT DID SCREENING QUESTIONS WANTS TO CHECK W/ TRANSPORTATON AND CALL BACK TO SCHEDULE     Screening Questions  BLUE  KIND OF PREP RED  LOCATION [review exclusion criteria] GREEN  SEDATION TYPE        N Are you active on mychart?      Ordering/Referring Provider?        [employer] What type of coverage do you have?      N Have you had a positive covid test in the last 14 days?    27.3 1. BMI  [BMI 40+ - review exclusion criteria]    Y  2. Are you able to give consent for your medical care? [IF NO,RN REVIEW]          N  3. Are you taking any prescription pain medications on a routine schedule   (ex narcotics: tramadol, oxycodone, roxicodone, oxycontin,  and percocet)?          3a. EXTENDED PREP What kind of prescription?     N 4. Do you have any chemical dependencies such as alcohol, street drugs, or methadone?        **If yes 3- 5 , please schedule with MAC sedation.**          IF YES TO ANY 6 - 10 - HOSPITAL SETTING ONLY.     N 6.   Do you need assistance transferring?     N 7.   Have you had a heart or lung transplant?    N 8.   Are you currently on dialysis?   N 9.   Do you use daily home oxygen?   N 10. Do you take nitroglycerin?   10a.  If yes, how often?     11. [FEMALES]   Are you currently pregnant?    11a.  If yes, how many weeks? [ Greater than 12 weeks, OR NEEDED]    N 12. Do you have Pulmonary Hypertension? *NEED PAC APPT AT [facility]*     N 13. [review exclusion criteria]  Do you have any implantable devices in your body (pacemaker, defib, LVAD)?    N 14. In the past 6 months, have you had any heart related issues including cardiomyopathy or heart attack?     14a.  If yes, did it require cardiac stenting if so when?     N 15. Have you had a stroke or Transient ischemic attack (TIA - aka  mini stroke ) within 6 months?      N 16. Do you have mod to severe Obstructive Sleep Apnea?  [Hospital only]    N 17. Do you have SEVERE AND UNCONTROLLED asthma? *NEED PAC APPT AT [facility]*     Y ELIQUIS 18. Are you currently taking  "any blood thinners?     18a. If yes, inform patient to \"follow up w/ ordering provider for bridging instructions.\"    N 19. Do you take the medication Phentermine?    19a. If yes, \"Hold for 7 days before procedure.  Please consult your prescribing provider if you have questions about holding this medication.\"     N  20. Do you have chronic kidney disease?      N  21. Do you have a diagnosis of diabetes?     N  22. On a regular basis do you go 3-5 days between bowel movements?      23. Preferred LOCAL Pharmacy for Pre Prescription    [ LIST ONLY ONE PHARMACY]     [id number] - [address]        - CLOSING REMINDERS -    Informed patient they will need an adult    Cannot take any type of public or medical transportation alone    Conscious Sedation- Needs  for 6 hours after the procedure       MAC/General-Needs  for 24 hours after procedure    Pre-Procedure Covid test to be completed [[facility] PCR Testing Required]    Confirmed Nurse will call to complete assessment       - SCHEDULING DETAILS -  N Hospital Setting Required? If yes, what is the exclusion?:    >  Surgeon    >  Date of Procedure  Upper and Lower Endoscopy [EGD and Colonoscopy]  Type of Procedure Scheduled  Location   [facility]-If you answer yes to questions #8, #20, #21Which Colonoscopy Prep was Sent?     MOD Sedation Type     N PAC / Pre-op Required               "

## 2023-01-12 NOTE — TELEPHONE ENCOUNTER
Patient did call back and thought they were on for 1/31, but I didn't see anything scheduled for then or noted as well.   She is on for 2/16/23 at [facility] with Moderate sedation with . details sent to her in mail

## 2023-02-06 ENCOUNTER — TELEPHONE (OUTPATIENT)
Dept: GASTROENTEROLOGY | Facility: CLINIC | Age: 78
End: 2023-02-06

## 2023-02-06 DIAGNOSIS — R19.7 DIARRHEA: Primary | ICD-10-CM

## 2023-02-06 RX ORDER — BISACODYL 5 MG
TABLET, DELAYED RELEASE (ENTERIC COATED) ORAL
Qty: 4 TABLET | Refills: 0 | Status: SHIPPED | OUTPATIENT
Start: 2023-02-06

## 2023-02-06 RX ORDER — BISACODYL 5 MG
TABLET, DELAYED RELEASE (ENTERIC COATED) ORAL
Qty: 4 TABLET | Refills: 0 | Status: SHIPPED | OUTPATIENT
Start: 2023-02-06 | End: 2024-01-05

## 2023-02-06 NOTE — LETTER
February 6, 2023      Bobby BRIGHT Mateus  [address] [id number]              Dear Bobby,        Colonoscopy/Upper endoscopy (EGD)     Procedure date: 2/16/23    Anticipated arrival time: 0900 AM   (Procedure Times are Subject to Change)    Facility location: [facility]; [address] [place] [address], [place] [address] - Check in location: Main entrance at registration desk. Parking information: Paid parking available in the Patient & Visitor Ramp on the corner of  [facility] and [facility].  options are available 24 hours for patients with mobility limitations. Self-park and shuttle service from the parking ramp available. The phone number for shuttle requests is [phone].    Prep Instructions: Instructions on how to prepare for your upcoming procedure are found below. Deviation from instructions may result in less than desired outcomes and procedure may need to be rescheduled.      Policy: Please arrive with a responsible adult who can drive you home after the exam and stay with you for the next 24 hours, unless otherwise specified. You will not be able to drive if you are having any type of sedation. You cannot take a taxi, Uber, train or bus by yourself. Procedure will be cancelled due to not having a .     If you are taking blood thinning medication: Medications such as Coumadin, Plavix, Rivaroxaban (Xarelto)..etc, may need to be temporarily stopped for multiple days before your scheduled procedure. Talk to your doctor. Your prescribing doctor will give you directions to see if these medications should be changed or stopped prior to your exam. Procedure may be canceled if medications are not accurately held.     If you have Diabetes: Ask to have your exam early in the morning if possible. Call your doctor if you have questions regarding your diet modifications and/or diabetic medications during prep.       If you take Phentermine (Adipex-P,  Lomaira): This MUST be held 7 days prior to your scheduled procedure. Your procedure will be canceled if this medication has not been held.     To reschedule or cancel your procedure: Please call our scheduling team at:   [facility]: [phone], option 2  Monday through Friday, 8 a.m. to 4:30 p.m.    ---------------------------------------------------------------------------------------------------------------------    STANDARD Golytely (Colyte, Nulytely)  Prep Instructions for your Colonoscopy    Bowel prep has been sent to [facility] #[id number] [address] AT [address]    Please read these instructions carefully at least 7 days prior to your colonoscopy procedure. Be sure to follow all directions completely. The inside of your colon must be clean to allow for a complete examination for the presence of any growths, polyps, and/or abnormalities, as well as their biopsy or removal. A number of tips are included in order to make this part of the procedure as comfortable as possible.    Getting ready:     A nurse will call you within a week of your exam to prescribe your bowel prep, review the prep instructions, and answer any other questions you have.     You must arrange for an adult to drive you home after your exam. Your colonoscopy cannot be done unless you have a ride. If you need to use public transportation, someone must ride with you and stay with you for a minimum of 6-24 hours.    Check with your insurance company to be sure they will cover this exam.    7 days before the exam:    Talk to your doctor:  If you take blood-thinners (such as Coumadin, Plavix, Xarelto), your prescription or schedule may need to change before the test.    Stop taking fiber supplements, multi-vitamins with iron, and medicines that contain iron.    Continue taking prescribed aspirin; talk to your prescribing doctor with any concerns.    Stop eating corn, nuts and  foods with seeds.  These can stay in the colon for days.    If you have diabetes:  Ask to have your exam early in the morning.  Also, ask your doctor if you should change your diet or medicines.    3 days before the exam:    Begin a low-fiber diet: No raw fruits or vegetables, whole wheat, seeds, nuts, popcorn or other high-fiber foods (see list on page). No binding agents: (bran, Metamucil, Fibercon) and no Olestra (a fat substitute).    One day before the exam:    You can have a light, low-fiber breakfast. But drink only clear liquids after 9 a.m. (see list below). Drink at least 8 to 10 full glasses of clear liquids during the day.     Fill the jug that contains the Golytely powder with warm water. Cover and shake until well mixed. Use a full gallon of water. Chill for 3 hours, but do not add ice.    You will start drinking half of the Golytely solution at 6 p.m. The timing of drinking the 2nd half of the Golytely solution depends on your exam time. See Step 2 below.    After you start drinking the solution, stay near a toilet. You may have watery stools (diarrhea), mild cramping, bloating , and nausea.     Step One:  o At 3 p.m., take 2 tablets of Dulcolax (bisacodyl).  o At 6 p.m. start drinking the Golytely solution. Drink an 8-ounce glass every 15 minutes until the jug is half empty. Drink each glass quickly.     Step Two:   If you arrive before 11 AM:  At 11 p.m. on the day before your exam:    Take 2 Dulcolax (Bisacodyl) tablets.     Start drinking the other half of the Golytely jug. Drink one 8-ounce glass every 15 minutes until the jug is empty. Drink each glass quickly.  If you arrive after 11 AM:  At 6 AM on the day of the exam:    Take 2 tablets of Dulcolax (Bisacodyl).     Drink the other half of the Golytely jug.     Drink one 8-ounce glass every 15 minutes until the jug is empty.     Drink each glass quickly.     You should finish the prep 4 hours before the exam.      Day of exam:      You may  drink clear liquids only up until 2 hours before your arrival time.    Do not drink anything 2 hours before your arrival, not even water. (If you must take medicine, you can take it with a sip of water.)     Do not chew or swallow anything including water or gum for at least 2 hours before your exam. If you do, we may cancel the exam for your safety.     Do not take diabetes medicine by mouth until after your exam.    If you have asthma, bring your inhaler.    Arrive with an adult who will take you home after your test. The medicine used will make you sleepy. If you don't have someone to take you home, we will cancel your test.       CLEAR LIQUIDS   You may have:    Water, tea, coffee (no milk or cream)    Soda pop, Gatorade (not red or purple)    Jell-O, Popsicles (no milk or fruit pieces - not red or purple)    Fat-free soup broth or bouillon    Plain hard candy, such as clear life savers (not red or purple)    Clear juices and fruit-flavored drinks, such as apple juice, white grape juice, Hi-C, and Carlos-Aid (not red or purple)   Do not have:    Milk or milk products such as ice cream, malts or shakes, or coffee creamer    Red or purple drinks of any kind such as cranberry juice or grape juice. Avoid red or purple Jell-O, Popsicles, Carlos-Aid, sorbet, sherbet and candy    Juices with pulp such as orange, grapefruit, pineapple or tomato juice    Cream soups of any kind    Alcohol and beer    Protein drinks or protein powder     LOW FIBER DIET   You may have:      Starches: White bread, rolls, biscuits, croissants, Hope toast, white flour tortillas, waffles, pancakes, Malian toast; white rice, noodles, pasta, macaroni; cooked and peeled potatoes; plain crackers, saltines; cooked farina or cream of rice; puffed rice, corn flakes, Rice Krispies, Special K     Vegetables: tender cooked and canned, vegetable broths    Fruits and fruit juices: Strained fruit juice, canned fruit without seeds or skin (not pineapple),  applesauce, pear sauce, ripe bananas, melons (not watermelon)     Milk products: Milk (plain or flavored), cheese, cottage cheese, yogurt (no berries), custard, ice cream      Proteins: Tender, well-cooked ground beef, lamb, veal, ham, pork, chicken, turkey, fish or organ meats; eggs; creamy peanut butter     Fats and condiments:  Margarine, butter, oils, mayonnaise, sour cream, salad dressing, plain gravy; spices, cooked herbs; sugar, clear jelly, honey, syrup     Snacks, sweets and drinks: Pretzels, hard candy; plain cakes and cookies (no nuts or seeds); gelatin, plain pudding, sherbet, Popsicles; coffee, tea, carbonated ( fizzy ) drinks Do not have:      Starches: Breads or rolls that contain nuts, seeds or fruit; whole wheat or whole grain breads that contain more than 1 gram of fiber per slice; cornbread; corn or whole wheat tortillas; potatoes with skin; brown rice, wild rice, kasha (buckwheat), and oatmeal     Vegetables: Any raw or steamed vegetables; vegetables with seeds; corn in any form     Fruits and fruit juices: Prunes, prune juice, raisins and other dried fruits, berries and other fruits with seeds, canned pineapple juices with pulp such as orange, grapefruit, pineapple or tomato juice    Milk products: Any yogurt with nuts, seeds or berries     Proteins: Tough, fibrous meats with gristle; cooked dried beans, peas or lentils; crunchy peanut butter    Fats and condiments: Pickles, olives, relish, horseradish; jam, marmalade, preserves     Snacks, sweets and drinks: Popcorn, nuts, seeds, granola, coconut, candies made with nuts or seeds; all desserts that contain nuts, seeds, raisins and other dried fruits, coconut, whole grains or bran.        FAQ:      How do you know if your colon is cleaned out?   o After completing the bowel prep, your bowel movements should be all liquid and yellow. Your bowel movements will look similar to urine in the toilet. If there are pieces of stool (poop) in the toilet,  or if you can't see to the bottom of the toilet, please call our office for advice. Call [phone] and ask to speak with a nurse.     Why do you need a responsible  to take you home and stay with you?  o We require a responsible adult to take you home for your safety. The sedation medicines used to relax you during the procedure can impair your judgement and reaction time, make you forgetful and possible a little unsteady. Do not drive, make any important decisions, or sign any legal documents for 24 hours after your procedure.     It is normal to feel bloated and gassy after your procedure. Walking will help move the air through your colon. You can take non-aspirin pain relievers that contain acetaminophen (Tylenol).     When can you eat after your procedure?  o You may resume your normal diet when you feel ready, unless advised otherwise by the doctor performing your procedure. Do not drink alcohol for 24 hours after your procedure.     You many resume normal activities (work, exercise, etc.) after 24 hours.     When will you get test results?  o You should have your procedure results and any lab results (if applicable) by letter, [employer] message, or phone call within 2 weeks. If you have any questions, please call the doctor that referred you for the procedure.       Thank you for choosing  [facility], for your procedure. If you are sent a survey regarding your care, please take the time to complete the questionnaire. We value your feedback!             Instructions for Your Upper Endoscopy      What is an upper endoscopy?  - This is an exam that checks for problems linked to heartburn, swallowing or belly (abdominal) pain or other symptoms of the upper GI tract. Depending on your symptoms, the doctor will look at your esophagus (food pipe), stomach and the part of the stomach that enters the small intestine.     Getting ready  - You must arrange for an adult to drive you home and stay with you  after your exam. This person will need to stay with you for 24 hours unless your provider says otherwise.   - Your exam cannot be done unless you have proper transportation. If you need to use public transportation someone must ride with you.  - Dress in comfortable, loose clothing.  - Bring your insurance card. Leave your purse, billfold, credit cards and other valuables at home.  - Bring a list of your medicines and known allergies. If you have a pacemaker or ICD, please bring your information card.  - We do our best to stay on time, but there may be a delay. Please bring something to pass the time, such as a newspaper or book.    - Important: You must complete all steps before the exam.     Seven days before the exam   - Talk to your doctor: If you take blood-thinners (such as Coumadin, Plavix, Xarelto), your prescription or schedule may need to change before the test.  - Continue taking prescribed aspirin; talk to your prescribing doctor with any concerns.    - If you have diabetes: Ask to have your exam early in the morning. Also, ask your doctor if you should change your diet or medicines    One day before the exam   - Stop eating all solid foods 8 hours prior to arrival time. You may drink clear liquids.   **If you have achalasia (treated or untreated) or gastroparesis, please be on a clear liquid diet for 24 hours prior to your exam and stop drinking all liquids at midnight.     Day of the exam  - You may drink clear liquids until 2 hours before your arrival time.  - You may take all of your morning medicines (except for diabetes pills) as usual with 4 oz. of water up to 2 hours before your arrival time.  - If you take diabetes medicine (pills): do not take them the morning of your test.  - Bring a list of your medicines and known allergies.  - Please arrive with an adult who can take you home after the test: The medicine will make you sleepy. If you do not have a , we may cancel your test.     What  are clear liquids?   You may have:  - Water, tea, coffee (no cream)  - Soda pop, Gatorade   - Clear nutrition drinks (Enlive, Resource Breeze)   - Jell-O, Popsicles (no milk or fruit pieces) or sorbet   - Fat-free soup broth or bouillon  - Plain hard cand, such as clear life savers   - Clear juices and fruit-flavored drinks such as apple juice, white grape juice, Hi-C and Carlos-Aid    Do not have:  - Milk or milk products such as ice cream, malts or shakes  - Juices with pulp such as orange, grapefruit, pineapple or tomato juice  - Cream soups of any kind  - Alcohol       During the exam  - The exam lasts from 10 to 20 minutes.   - We will review the risks and benefits of the exam. We will then ask you to sign a consent form.  - We will place a small needle (IV) in your hand or arm. We will give you medicine through the IV to keep you comfortable.   -We will spray a numbing medicine into your throat. This will reduce gagging.   - We will help you swallow a flexible tube (the endoscope). You may feel some discomfort at first. The tube will not get in the way of your breathing.  - You will not be able to talk with the endoscope in your mouth. Use hand signals instead.  - When we put air into your stomach, you may feel full or have mild cramps. We will remove the air at the end of the exam.  - To reduce discomfort, breathe at a slow, even pace. Try to relax the muscles in your neck and shoulders.  - We may take a small piece of tissue (a biopsy) to test in the lab. It will not hurt. We may also take pictures of your insides.     After the exam  - You will rest in the recovery area until you feel ready to leave. This takes about 30 to 60 minutes.   - We will remove the IV.  - You may burp up air left in your stomach.  - You may feel drowsy or a little dizzy from the medicine.   - Your doctor will discuss your results. You will receive your test results in 7 to 10 business days by letter or MyChart.   - Your throat may  feel numb. One hour after the exam, swallow a small amount of cool water. If you can swallow easily, you may go back to your regular diet and medicines.  - Your throat may be sore for the rest of the day. Throat lozenges or ice chips may help.  - Do not drive for 24 hours.    Call us at once if you have:  - Unusual pain or problems swallowing, unusual stomach or chest pain.  - Vomit that looks like coffee grounds or black or bloody stools (bowel movements).  - A fever above 100.6  F (37.5  C) when taken under the tongue.    Test results  - You will receive your results in 7 to 10 business days by phone, letter or MyChart.    For questions or appointments, call:  [facility]   [phone], option 2  Monday through Friday, 8 a.m. to 4:30 p.m.  (If it is after hours please reach out to the clinic or provider you were scheduled with.)    You should be aware that your endoscopist may be a part of a study to improve endoscopy procedures.  As part of a study, pictures gathered from your procedure may be stored and analyzed in a de-identified manner.

## 2023-02-06 NOTE — TELEPHONE ENCOUNTER
Attempted to contact patient regarding upcoming Colonoscopy/Upper endoscopy (EGD) procedure on 2/16/23 for pre assessment questions.    Went through assessment questions with pt. She said it was a lot and her  usually takes care. She says she has not received her letter that was sent on 1/12/23. Writer stated will send new letter and also email listed. Pt states her  will call back to go over instructions again.    He will return call to [phone] #4    Discuss Covid policy and designated  policy.    Pre op exam scheduled: N/A    Arrival time: 0900. Procedure time: 1000    Facility location: [facility]; [address], [address], [place] [address]    Sedation type: Conscious sedation     Anticoagulants: Yes Apixaban (Eliquis). Holding interval of 2 days  - Did instruct pt to hold this med and to call cardiologist or whoever manages it to make sure it's ok.    Electronic implanted devices? No    Diabetic? No    Indication for procedure: Epigastric pain and Diarrhea    Bowel prep recommendation: Standard Golytely      Prep instructions sent via letter and email.   Pt acknowledges that they have agreed to accept the risks and receive unencrypted communications for this incidence.     Bowel prep script sent to     [facility] #[id number] [facility] [address] AT [facility]      Dina Guevara RN  Endoscopy Procedure Pre Assessment RN

## 2023-02-08 ENCOUNTER — LAB (OUTPATIENT)
Dept: LAB | Facility: CLINIC | Age: 78
End: 2023-02-08
Payer: COMMERCIAL

## 2023-02-08 ENCOUNTER — OFFICE VISIT (OUTPATIENT)
Dept: INTERNAL MEDICINE | Facility: CLINIC | Age: 78
End: 2023-02-08
Payer: COMMERCIAL

## 2023-02-08 VITALS
OXYGEN SATURATION: 97 % | SYSTOLIC BLOOD PRESSURE: 127 MMHG | DIASTOLIC BLOOD PRESSURE: 70 MMHG | HEART RATE: 110 BPM | WEIGHT: 148 LBS | BODY MASS INDEX: 32.03 KG/M2

## 2023-02-08 DIAGNOSIS — E03.9 HYPOTHYROIDISM, UNSPECIFIED TYPE: ICD-10-CM

## 2023-02-08 DIAGNOSIS — R79.89 OTHER SPECIFIED ABNORMAL FINDINGS OF BLOOD CHEMISTRY: ICD-10-CM

## 2023-02-08 DIAGNOSIS — G62.9 PERIPHERAL POLYNEUROPATHY: Primary | ICD-10-CM

## 2023-02-08 DIAGNOSIS — E78.5 HYPERLIPIDEMIA, UNSPECIFIED HYPERLIPIDEMIA TYPE: ICD-10-CM

## 2023-02-08 DIAGNOSIS — L20.9 ATOPIC DERMATITIS, UNSPECIFIED TYPE: ICD-10-CM

## 2023-02-08 LAB
CHOLEST SERPL-MCNC: 122 MG/DL
HBA1C MFR BLD: 6.7 %
HDLC SERPL-MCNC: 45 MG/DL
LDLC SERPL CALC-MCNC: 59 MG/DL
NONHDLC SERPL-MCNC: 77 MG/DL
TRIGL SERPL-MCNC: 90 MG/DL
TSH SERPL DL<=0.005 MIU/L-ACNC: 0.65 UIU/ML (ref 0.3–4.2)

## 2023-02-08 PROCEDURE — 84443 ASSAY THYROID STIM HORMONE: CPT | Performed by: PATHOLOGY

## 2023-02-08 PROCEDURE — 80061 LIPID PANEL: CPT | Performed by: PATHOLOGY

## 2023-02-08 PROCEDURE — 99214 OFFICE O/P EST MOD 30 MIN: CPT | Performed by: INTERNAL MEDICINE

## 2023-02-08 PROCEDURE — 36415 COLL VENOUS BLD VENIPUNCTURE: CPT | Performed by: PATHOLOGY

## 2023-02-08 PROCEDURE — 83036 HEMOGLOBIN GLYCOSYLATED A1C: CPT | Performed by: PATHOLOGY

## 2023-02-08 RX ORDER — GABAPENTIN 300 MG/1
300 CAPSULE ORAL AT BEDTIME
Qty: 30 CAPSULE | Refills: 4 | Status: SHIPPED | OUTPATIENT
Start: 2023-02-08 | End: 2023-03-07

## 2023-02-08 RX ORDER — TRIAMCINOLONE ACETONIDE 1 MG/G
CREAM TOPICAL 2 TIMES DAILY
Qty: 80 G | Refills: 4 | Status: SHIPPED | OUTPATIENT
Start: 2023-02-08 | End: 2024-01-05

## 2023-02-08 NOTE — NURSING NOTE
Bobby Ignacio is a 77 year old female that presents in clinic today for the following:     Chief Complaint   Patient presents with     Follow Up     Pt here for follow up and wants to discuss pain and skin        The patient's allergies and medications were reviewed. The patient's vitals were obtained without incident. The patient does not have any other questions for the provider.     Aram Valentin, EMT at 1:30 PM on 2/8/2023.  Primary Care Clinic: [phone]

## 2023-02-08 NOTE — PATIENT INSTRUCTIONS
Please discuss bone density treatment with Dr. Bain at your establish care visit.  It appears you have not been on an oral bisphosphonate such as alendronate which we may recommend.

## 2023-02-09 NOTE — TELEPHONE ENCOUNTER
Second attempt for pre-assessment prior to upcoming colonoscopy. Calling to finish going over instructions with patients .    No answer.  Left message to return call [phone] #4    Pretty De Leon RN  Endoscopy Procedure Pre Assessment RN

## 2023-02-13 ENCOUNTER — OFFICE VISIT (OUTPATIENT)
Dept: NEUROLOGY | Facility: CLINIC | Age: 78
End: 2023-02-13
Attending: INTERNAL MEDICINE
Payer: COMMERCIAL

## 2023-02-13 DIAGNOSIS — G62.9 PERIPHERAL POLYNEUROPATHY: ICD-10-CM

## 2023-02-13 DIAGNOSIS — M47.27 OSTEOARTHRITIS OF SPINE WITH RADICULOPATHY, LUMBOSACRAL REGION: Primary | ICD-10-CM

## 2023-02-13 PROBLEM — E11.9 DIABETES MELLITUS, TYPE 2 (H): Status: ACTIVE | Noted: 2023-02-13

## 2023-02-13 PROCEDURE — 95911 NRV CNDJ TEST 9-10 STUDIES: CPT | Performed by: PSYCHIATRY & NEUROLOGY

## 2023-02-13 PROCEDURE — 95885 MUSC TST DONE W/NERV TST LIM: CPT | Mod: 59 | Performed by: PSYCHIATRY & NEUROLOGY

## 2023-02-13 PROCEDURE — 95886 MUSC TEST DONE W/N TEST COMP: CPT | Performed by: PSYCHIATRY & NEUROLOGY

## 2023-02-13 NOTE — PROGRESS NOTES
[facility]  Electrodiagnostic Laboratory                 Department of Neurology                                                                                                         Test Date:  2023    Patient: Bobby Ignacio : 1945 Physician: Garry Cheung MD   Sex: Male AGE: 77 year Ref Phys: Aishwarya Gillespie MD   ID#: [id number]   Technician: Jasvir Ritchie     Clinical Information:  77 year old with burning pain in feet and legs. She also reports tingling in hands. Evaluate for polyneuropathy vs radiuclopathy.    Techniques:  Sensory and motor conduction studies were done with surface recording electrodes. EMG was done with a concentric needle electrode.     Results:  Nerve conduction studies:   1. Bilateral sural, right median-D2, right ulnar-D5 and right radial sensory responses are normal.   2. Right median-APB, ulnar-ADM, peroneal-EDB and tibial-AH motor responses are normal.     Needle EM. Fibrillation potentials and positive sharp waves were seen in the right  gastrocnemius and LS paraspinal muscles. CRDs were seen in the right PL muscle.   2. Large amplitude and/or long duration motor unit potentials (MUP) were seen in the bilateral gastrocnemius and right PL muscles. MUPs with increased polyphasia were seen in the right glut max muscle.  3. Recruitment patterns are normal.     Interpretation:  This is an abnormal study. There is electrophysiologic evidence of chronic bilateral S1 radiculopathies. There is no evidence of a large-fiber polyneuropathy affecting the upper or lower limbs on the basis of this study. These findings are essentially unchanged when compared to a study dated 10/12/2020.     Garry Cheung MD  Department of Neurology          Nerve Conduction Studies  Motor Sites      Latency Amplitude Neg. Amp Diff Segment Distance Velocity Neg. Dur Neg Area Diff Temperature Comment   Site (ms) Norm (mV) Norm %  cm m/s Norm ms %  C    Right Fibular  (EDB) Motor   Ankle 3.7  < 6.0 3.0  > 2.0  Ankle-EDB 8   5.4  31.7    Bel Fib Head 9.4 - 2.3 - -23.3 Bel Fib Head-Ankle 25.5 45  > 38 6.0 -16.5 31.6    Pop Fossa 11.4 - 2.5 - 8.7 Pop Fossa-Bel Fib Head 9.7 49  > 38 6.1 14.1 31.5    Right Median (APB) Motor   Wrist 3.6  < 4.4 6.1  > 5.0  Wrist-APB 8   4.1  33.9    Elbow 7.4 - 6.0  > 5.0 -1.64 Elbow-Wrist 19.5 51  > 48 4.1 -3.9 34    Right Tibial (AHB) Motor   Ankle 3.1  < 6.5 10.1  > 5.0  Ankle-AHB 8   4.8  31.8    Knee 11.4 - 7.4 - -26.7 Knee-Ankle 33 40  > 38 5.5 -15.7 31.5    Right Ulnar (ADM) Motor   Wrist 3.1  < 3.5 8.1  > 5.0  Wrist-ADM 8   3.6  33.5    Bel Elbow 6.3 - 7.6 - -6.2 Bel Elbow-Wrist 17.5 55  > 48 3.6 -2.8 33.3    Abv Elbow 8.1 - 7.4 - -2.6 Abv Elbow-Bel Elbow 12.5 69  > 48 3.7 -2.9 33.2      Sensory Sites      Onset Lat Peak Lat Amp (O-P) Amp (P-P) Segment Distance Velocity Temperature Comment   Site ms ms  V Norm  V  cm m/s Norm  C    Right Median Sensory   Wrist-Dig II 2.6 3.3 18  > 10 44 Wrist-Dig II 14 54  > 48 32.3    Right Median-Ulnar Palmar Sensory        Median   Palm-Wrist 1.63 2.2 46 - 61 Palm-Wrist 8 49 - 33.2         Ulnar   Palm-Wrist 1.25 1.83 15 - 15 Palm-Wrist 8 64 - 33.3    Right Radial Sensory   Forearm-Wrist 1.58 2.1 19  > 15 26 Forearm-Wrist 10 63 - 32.9    Left Sural Sensory   Calf-Lat Mall 2.5 3.1 13  > 5 15 Calf-Lat Mall 14 56  > 38 32.2    Right Sural Sensory   Calf-Lat Mall 2.8 3.5 12  > 5 15 Calf-Lat Mall 14 50  > 38 31.8    Right Ulnar Sensory   Wrist-Dig V 2.2 2.9 11  > 8 62 Wrist-Dig V 12.5 57  > 48 32.6      Inter-Nerve Comparisons     Nerve 1 Value 1 Nerve 2 Value 2 Parameter Result Normal   Sensory Sites   R Median Palm-Wrist 2.2 ms R Ulnar Palm-Wrist 1.8 ms Peak Lat Diff 0.37 ms <0.40       Electromyography     Side Muscle Ins Act Fibs/PSW Fasc HF Amp Dur Poly Recrt Int Pat   Right Tib Anterior Nml None Nml 0 Nml Nml 0 Nml Nml   Right Vastus Lat Nml None Nml 0 Nml Nml 0 Nml Nml   Right Gastroc Incr None Nml 0 1+  Nml 1+ Nml Nml   Right Fib Longus Nml None Nml CRD 1+ Nml 0 Nml Nml   Right Gluteus Max Nml None Nml 0 Nml Nml 1+ Nml Nml   Right Lumbo Parasp (Lower) Incr 1+ Nml 0        Left Vastus Lat Nml None Nml 0 Nml Nml 0 Nml Nml   Left Tib Anterior Nml None Nml 0 Nml Nml 0 Nml Nml   Left Gastroc Nml None Nml 0 Nml 1+ 1+ Nml Nml         NCS Waveforms:    Motor                  Sensory

## 2023-02-13 NOTE — LETTER
2023       RE: Bobby Ignacio  [address] [address]     Dear Colleague,    Thank you for referring your patient, Bobby Ignacio, to the [facility] EMG CLIN[facility] [place] at [facility]. Please see a copy of my visit note below.                        [facility]  Electrodiagnostic Laboratory                 Department of Neurology                                                                                                         Test Date:  2023    Patient: Bobby Ignacio : 1945 Physician: Garry Cheung MD   Sex: Male AGE: 77 year Ref Phys: Aishwarya Gillespie MD   ID#: [id number]   Technician: Jasvir Ritchie     Clinical Information:  77 year old with burning pain in feet and legs. She also reports tingling in hands. Evaluate for polyneuropathy vs radiuclopathy.    Techniques:  Sensory and motor conduction studies were done with surface recording electrodes. EMG was done with a concentric needle electrode.     Results:  Nerve conduction studies:   1. Bilateral sural, right median-D2, right ulnar-D5 and right radial sensory responses are normal.   2. Right median-APB, ulnar-ADM, peroneal-EDB and tibial-AH motor responses are normal.     Needle EM. Fibrillation potentials and positive sharp waves were seen in the right  gastrocnemius and LS paraspinal muscles. CRDs were seen in the right PL muscle.   2. Large amplitude and/or long duration motor unit potentials (MUP) were seen in the bilateral gastrocnemius and right PL muscles. MUPs with increased polyphasia were seen in the right glut max muscle.  3. Recruitment patterns are normal.     Interpretation:  This is an abnormal study. There is electrophysiologic evidence of chronic bilateral S1 radiculopathies. There is no evidence of a large-fiber polyneuropathy affecting the upper or lower limbs on the basis of this study. These findings are essentially  unchanged when compared to a study dated 10/12/2020.     Garry Cheung MD  Department of Neurology          Nerve Conduction Studies  Motor Sites      Latency Amplitude Neg. Amp Diff Segment Distance Velocity Neg. Dur Neg Area Diff Temperature Comment   Site (ms) Norm (mV) Norm %  cm m/s Norm ms %  C    Right Fibular (EDB) Motor   Ankle 3.7  < 6.0 3.0  > 2.0  Ankle-EDB 8   5.4  31.7    Bel Fib Head 9.4 - 2.3 - -23.3 Bel Fib Head-Ankle 25.5 45  > 38 6.0 -16.5 31.6    Pop Fossa 11.4 - 2.5 - 8.7 Pop Fossa-Bel Fib Head 9.7 49  > 38 6.1 14.1 31.5    Right Median (APB) Motor   Wrist 3.6  < 4.4 6.1  > 5.0  Wrist-APB 8   4.1  33.9    Elbow 7.4 - 6.0  > 5.0 -1.64 Elbow-Wrist 19.5 51  > 48 4.1 -3.9 34    Right Tibial (AHB) Motor   Ankle 3.1  < 6.5 10.1  > 5.0  Ankle-AHB 8   4.8  31.8    Knee 11.4 - 7.4 - -26.7 Knee-Ankle 33 40  > 38 5.5 -15.7 31.5    Right Ulnar (ADM) Motor   Wrist 3.1  < 3.5 8.1  > 5.0  Wrist-ADM 8   3.6  33.5    Bel Elbow 6.3 - 7.6 - -6.2 Bel Elbow-Wrist 17.5 55  > 48 3.6 -2.8 33.3    Abv Elbow 8.1 - 7.4 - -2.6 Abv Elbow-Bel Elbow 12.5 69  > 48 3.7 -2.9 33.2      Sensory Sites      Onset Lat Peak Lat Amp (O-P) Amp (P-P) Segment Distance Velocity Temperature Comment   Site ms ms  V Norm  V  cm m/s Norm  C    Right Median Sensory   Wrist-Dig II 2.6 3.3 18  > 10 44 Wrist-Dig II 14 54  > 48 32.3    Right Median-Ulnar Palmar Sensory        Median   Palm-Wrist 1.63 2.2 46 - 61 Palm-Wrist 8 49 - 33.2         Ulnar   Palm-Wrist 1.25 1.83 15 - 15 Palm-Wrist 8 64 - 33.3    Right Radial Sensory   Forearm-Wrist 1.58 2.1 19  > 15 26 Forearm-Wrist 10 63 - 32.9    Left Sural Sensory   Calf-Lat Mall 2.5 3.1 13  > 5 15 Calf-Lat Mall 14 56  > 38 32.2    Right Sural Sensory   Calf-Lat Mall 2.8 3.5 12  > 5 15 Calf-Lat Mall 14 50  > 38 31.8    Right Ulnar Sensory   Wrist-Dig V 2.2 2.9 11  > 8 62 Wrist-Dig V 12.5 57  > 48 32.6      Inter-Nerve Comparisons     Nerve 1 Value 1 Nerve 2 Value 2 Parameter Result Normal   Sensory  Sites   R Median Palm-Wrist 2.2 ms R Ulnar Palm-Wrist 1.8 ms Peak Lat Diff 0.37 ms <0.40       Electromyography     Side Muscle Ins Act Fibs/PSW Fasc HF Amp Dur Poly Recrt Int Pat   Right Tib Anterior Nml None Nml 0 Nml Nml 0 Nml Nml   Right Vastus Lat Nml None Nml 0 Nml Nml 0 Nml Nml   Right Gastroc Incr None Nml 0 1+ Nml 1+ Nml Nml   Right Fib Longus Nml None Nml CRD 1+ Nml 0 Nml Nml   Right Gluteus Max Nml None Nml 0 Nml Nml 1+ Nml Nml   Right Lumbo Parasp (Lower) Incr 1+ Nml 0        Left Vastus Lat Nml None Nml 0 Nml Nml 0 Nml Nml   Left Tib Anterior Nml None Nml 0 Nml Nml 0 Nml Nml   Left Gastroc Nml None Nml 0 Nml 1+ 1+ Nml Nml         NCS Waveforms:    Motor                  Sensory                                   Sincerely,    Garry Cheung MD

## 2023-02-14 NOTE — TELEPHONE ENCOUNTER
Third attempt for pre-assessment prior to upcoming colonoscopy/upper endoscopy     No answer.  Left message to return call [phone] #4. LM on husbands cell phone number to call back as patient was very overwhelmed when going through the instructions.     Need to verify patient held nato De Leon, CARLO  Endoscopy Procedure Pre Assessment RN

## 2023-02-20 ENCOUNTER — TELEPHONE (OUTPATIENT)
Dept: INTERNAL MEDICINE | Facility: CLINIC | Age: 78
End: 2023-02-20
Payer: COMMERCIAL

## 2023-02-20 NOTE — TELEPHONE ENCOUNTER
[facility] Call Center    Phone Message    May a detailed message be left on voicemail: yes     Reason for Call: Other: Patients  calling stating the patient is feeling ill and wants to speak with a nurse regarding this, was hard to understand patient, please contact back to discuss their concerns.      Action Taken: Message routed to:  [facility] pcc    Travel Screening: Not Applicable

## 2023-02-20 NOTE — TELEPHONE ENCOUNTER
RN called patient's spouse let him know Dr. Gillespie had sent a result note about patient having a high A1C and needing to follow up.  Patient has neuropathy, and RN offered him an appt for patient to see Dr. Wilkins on 2/22/23, and he was agreeable.  Appt was made.    Kayla Gambino RN on 2/20/2023 at 4:34 PM

## 2023-02-22 NOTE — PROGRESS NOTES
Bobby is a very pleasant 77 year old female who was seen today for follow up.  She endorses two symptoms/concerns:    1.  A burning type sensation in both feet.  Based on the description, I entertained peripheral neuropathy.  Will check EMG as well as labs to screen for thyroid dysfunction, diabetes.    2.  Itchy skin on both hands.  Worse in the past few months possibly due to dry winter air.    I also noted a history of hypercholesterolemia on atorvastatin; due for a lipid panel.    On exam  /70 (BP Location: Right arm, Patient Position: Sitting, Cuff Size: Adult Regular)   Pulse 110   Wt 67.1 kg (148 lb)   SpO2 97%   BMI 32.03 kg/m     Foot exam: no skin breakdown or ulceration.  Strong pedal pulses.  Diminished sensation to light touch noted over distal 1/3 of feet bilaterally.  Skin: faint erythema with overlying scale in between the digits of both hands    A/P 77 year old here for multiple follow up issues    Peripheral polyneuropathy  -    Start gabapentin (NEURONTIN) 300 MG capsule; Take 1 capsule (300 mg) by mouth At Bedtime  -     EMG; Future    Hypothyroidism, unspecified type  -     Hemoglobin A1c; Future    Hyperlipidemia, unspecified hyperlipidemia type  -     TSH with free T4 reflex; Future  -     Hemoglobin A1c; Future  -     Lipid Profile FASTING; Future    Other specified abnormal findings of blood chemistry  -     Hemoglobin A1c; Future    Atopic dermatitis, unspecified type  -     triamcinolone (KENALOG) 0.1 % external cream; Apply topically 2 times daily    She is hoping to establish care with Dr. Salvador Bain for follow up.    Aishwarya Spencer MD

## 2023-03-06 ENCOUNTER — TELEPHONE (OUTPATIENT)
Dept: NEUROLOGY | Facility: CLINIC | Age: 78
End: 2023-03-06
Payer: COMMERCIAL

## 2023-03-06 NOTE — TELEPHONE ENCOUNTER
[facility]    Phone Message    May a detailed message be left on voicemail: yes     Reason for Call: Requesting Results   Name/type of test: EMG  Date of test: 2/13/2023  Was test done at a location other than [facility] (Please fill in the location if not [facility])?: No      Action Taken: Message routed to:  [facility] Neurology    Travel Screening: Not Applicable

## 2023-03-07 ENCOUNTER — TELEPHONE (OUTPATIENT)
Dept: INTERNAL MEDICINE | Facility: CLINIC | Age: 78
End: 2023-03-07
Payer: COMMERCIAL

## 2023-03-07 ENCOUNTER — VIRTUAL VISIT (OUTPATIENT)
Dept: INTERNAL MEDICINE | Facility: CLINIC | Age: 78
End: 2023-03-07
Payer: COMMERCIAL

## 2023-03-07 DIAGNOSIS — G62.9 PERIPHERAL POLYNEUROPATHY: ICD-10-CM

## 2023-03-07 DIAGNOSIS — M54.17 LUMBOSACRAL RADICULOPATHY AT S1: Primary | ICD-10-CM

## 2023-03-07 PROCEDURE — 99215 OFFICE O/P EST HI 40 MIN: CPT | Mod: VID | Performed by: NURSE PRACTITIONER

## 2023-03-07 RX ORDER — GABAPENTIN 300 MG/1
600 CAPSULE ORAL AT BEDTIME
Qty: 60 CAPSULE | Refills: 4 | Status: SHIPPED | OUTPATIENT
Start: 2023-03-07 | End: 2023-09-26

## 2023-03-07 NOTE — TELEPHONE ENCOUNTER
[facility] Call Center    Phone Message    May a detailed message be left on voicemail: yes     Reason for Call: Other: Patient's daughter calling regarding the patients EMG test done at the beginning of February. She states Dr. Jose Miguel Spencer wanted to go over the results with them, but the patient does not speak English so it has to be done with her daughter as well. Her daughter leaves tomorrow to return to [place].      Action Taken: Message routed to:  [facility] pcc    Travel Screening: Not Applicable

## 2023-03-07 NOTE — PATIENT INSTRUCTIONS
Thank you for visiting the Primary Care Center today at the [facility]! The following is some information about our clinic:     Primary Care Center Frequently-Asked Questions    (1) How do I schedule appointments at the [facility]?     Primary Care--to schedule or make changes to an existing appointment, please call our primary care line at [phone].    Labs--to schedule a lab appointment at the [facility] you can use [employer] or call [phone]. If you have a [place] location that is closer to home, you can reach out to that location for scheduling options.     Imaging--if you need to schedule a CT, X-ray, MRI, ultrasound, or other imaging study you can call [phone] to schedule at the [facility] or any other [facility] imaging location.     Referrals--if a referral to another specialty was ordered you can expect a phone call from their scheduling team. If you have not heard from them in a week, please call us or send us a [employer] message to check the status or get a scheduling number. Please note that this only applies to internal [facility] referrals. If the referral is external you would need to contact their office for scheduling.     (2) I have a question about my visit, who do I contact?     You can call us at the primary care line at [phone] to ask questions about your visit. You can also send a secure message through [employer], which is reviewed by clinic staff. Please note that [employer] messages have a twenty-four to forty-eight business hour turnaround time and should not be used for urgent concerns.    (3) How will I get the results of my tests?    If you are signed up for [employer] all tests will be released to you within twenty-four hours of resulting. Please allow three to five days for your doctor to review your results and place a note interpreting the results. If you do not have [employer]hart you will receive your  results through mail seven to ten business days following the return of the tests. Please note that if there should be any urgent or concerning results that your doctor or their registered nurse will reach out to you the same day as the tests come back. If you have follow up questions about your results or would like to discuss the results in detail please schedule a follow up with your provider either in person or virtually.     (4) How do I get refills of my prescriptions?     You should always first contact your pharmacy for refills of your medications. If submitting a refill request on Well, please be sure to submit the request only once--repeat requests can cause delays in refill. If you are requesting a NEW medication or a medication related to new symptoms you will need to schedule an appointment with a provider prior to approval. Please note: Routine medication refills have up to one to three business day turnaround whereas controlled substances refills have up to five to seven business day turnaround.    (5) I have new symptoms, what do I do?     If you are having an immediate medical emergency, you should dial 911 for assistance.   For anything urgent that needs to be seen within a few hours to one day you should visit a local urgent care for assistance.  For non-urgent symptoms that need to be seen within a few days to a week you can schedule with an available provider in primary care by going to [email or web address] or calling [phone].   If you are not sure how serious your symptoms are or you would like to receive medical advice you can always call [phone] to speak with a triage nurse.

## 2023-03-07 NOTE — TELEPHONE ENCOUNTER
RN left voice message letting patient's daughter know that at the appt tomorrow with Dr. Villanueva, the results can be relayed with help from an , and that Dr. Gillespie is not available today as she is not seeing patients in clinic.    Kayla Gambino RN on 3/7/2023 at 10:47 AM

## 2023-03-07 NOTE — TELEPHONE ENCOUNTER
[facility] Call [facility]    Phone Message    May a detailed message be left on voicemail: yes     Reason for Call: Other: Patient daughter calling about the test results and she would like to have the dr call today as she is leaving town tomorrow and would like to speak to the dr as well with her mom the patient.     Action Taken: Message routed to:  [facility] PCC    Travel Screening: Not Applicable

## 2023-03-07 NOTE — NURSING NOTE
Is the patient currently in the state of [place]? YES    Visit mode:VIDEO    If the visit is dropped, the patient can be reconnected by: VIDEO VISIT: Text to cell phone: [phone]    Will anyone else be joining the visit? Yes, daughter will be part of visit      How would you like to obtain your AVS? MyChart    Are changes needed to the allergy or medication list? NO    Reason for visit: EKG results    Ana Falk VF

## 2023-03-07 NOTE — PROGRESS NOTES
Bobby is a 77 year old who is being evaluated via a billable video visit.      How would you like to obtain your AVS? Mail a copy  If the video visit is dropped, the invitation should be resent by: Text to cell phone: [phone]  Will anyone else be joining your video visit? No          Assessment & Plan     Peripheral polyneuropathy  Has not noticed improvement with Gabapentin 300 mg at bedtime. Will increase dose to 600 mg at bedtime. Reviewed potential side effects, monitor for sedation. Previously had gabapentin listed as an allergy in 2020--she feels this was more related to her thyroid level at that time, and is now on Sertraline for her mood. Will continue to monitor for any s/s worsening depression.   - gabapentin (NEURONTIN) 300 MG capsule; Take 2 capsules (600 mg) by mouth At Bedtime    Lumbosacral radiculopathy at S1  Reviewed EMG results showing radiculopathy at S1. Will repeat MRI and refer to spine team as well as physical therapy for further management.  A1c was recently found to be elevated at 6.7 (previously in pre-diabetic range), but given EMG and prior MRI findings suspect nerve pain less likely to be related to diabetes at this point.   - MR Lumbar Spine w/o Contrast; Future  - Spine  Referral; Future      42 minutes spent on the date of the encounter doing chart review, history and exam, documentation and further activities per the note      Follow-up in 2-3 weeks--reschedule establish care visit with Dr. Rich Adorno, [facility]   Bobby is a 77 year old accompanied by her daughter, presenting for the following health issues:  No chief complaint on file.      HPI       Presents to review EMG results. Saw Dr. Gillespie on 2/8/23 for longstanding burning sensation in both feet and was referred for EMG; advised to start Gabapentin 300 mg at bedtime. TSH was WNL, A1c 6.7.    EMG (2/13/23) results, per   "[facility] in Neurology:  \"Interpretation:  This is an abnormal study. There is electrophysiologic evidence of chronic bilateral S1 radiculopathies. There is no evidence of a large-fiber polyneuropathy affecting the upper or lower limbs on the basis of this study. These findings are essentially unchanged when compared to a study dated 10/12/2020.\"    MR Lumbar Spine (11/27/2020), per radiology read:   \"Impression:   1. 4 lumbar-type vertebra counting from C2 with sacralized L5. For standardization, lowermost well-defined disc space is designated as L5.  2. Multilevel degenerative changes of the lumbar spine. Most significant findings are at L4-5, where there is bilateral moderate/severe foraminal stenosis and mild spinal canal narrowing. Disc bulge/facet hypertrophy abuts the descending bilateral S1 nerve roots in the lateral recess and exiting L5 nerve roots. Recommend clinical correlation for L5/S1 radiculopathy. Findings are more conspicuous since 2019, likely due to technical advancements, although true worsening of the degenerative findings is possibility.\"    Daughter lives in [place] and is returning home tomorrow morning. Bobby's  is currently in the hospital for a new pacemaker, was just discharged from ICU and will be unable to drive her to the clinic tomorrow, needs to reschedule establish care visit with Dr. Villanueva    Today, Bobby reports that she has a lot of pain in the legs, with tingling and burning sensation in bilateral thighs that travels to the toes. Seems to be equal on both sides. Feet burn a lot, has wrapped toes in bandages, become so tender.  Has had wounds on toes, sometimes on hands, improved with Kenalog cream from last visit.   Started gabapentin at bedtime, but no noted improvement. Can feel the pain all night. No SEs.   Notes she had a fall when in [place] over the summer.     [phone]--Yessi daughter    Review of Systems   Constitutional, HEENT, cardiovascular, pulmonary, gi and gu " "systems are negative, except as otherwise noted.      Objective    Vitals - Patient Reported  Weight (Patient Reported): 61.2 kg (135 lb)  Height (Patient Reported): 144.8 cm (4' 9\")  BMI (Based on Pt Reported Ht/Wt): 29.21  Pain Score:  (at times about 10 and during the day about a 5)  Pain Loc: Other - see comment (leg cramps, pain and burning, has toes wrapped in bandages from pain and very tender, mostly in thighs.)        Physical Exam   GENERAL: Healthy, alert and no distress  EYES: Eyes grossly normal to inspection.  No discharge or erythema, or obvious scleral/conjunctival abnormalities.  RESP: No audible wheeze, cough, or visible cyanosis.  No visible retractions or increased work of breathing.    SKIN: Visible skin clear. No significant rash, abnormal pigmentation or lesions.  NEURO: Cranial nerves grossly intact.  Mentation and speech appropriate for age.  PSYCH: Mentation appears normal, affect normal/bright, judgement and insight intact, normal speech and appearance well-groomed.                  Type of service:  Video Visit    Video Start Time (time video started): 1:10 PM    Video End Time (time video stopped): 1:29 PM    Originating Location (pt. Location): Home        Distant Location (provider location):  Off-site    Mode of Communication:  Video Conference via [employer]        "

## 2023-03-07 NOTE — Clinical Note
Hi, couple things--can you call them today--Bobby needs to reschedule her appointment with Dr. Villanueva tomorrow--her  is currently in the hospital and her daughter is flying home to [place] tomorrow morning, so she won't be able to make it into the clinic. Can you also help her schedule the MRI? Requested that you call her daughter Yessi at [phone]. Thank you! -Daria

## 2023-03-13 ENCOUNTER — ANCILLARY PROCEDURE (OUTPATIENT)
Dept: MRI IMAGING | Facility: CLINIC | Age: 78
End: 2023-03-13
Attending: NURSE PRACTITIONER
Payer: COMMERCIAL

## 2023-03-13 DIAGNOSIS — M54.17 LUMBOSACRAL RADICULOPATHY AT S1: ICD-10-CM

## 2023-03-13 PROCEDURE — 72148 MRI LUMBAR SPINE W/O DYE: CPT | Mod: TC | Performed by: RADIOLOGY

## 2023-03-16 ENCOUNTER — TELEPHONE (OUTPATIENT)
Dept: INTERNAL MEDICINE | Facility: CLINIC | Age: 78
End: 2023-03-16
Payer: COMMERCIAL

## 2023-03-16 NOTE — TELEPHONE ENCOUNTER
RN called patient with help of Edward  to relay the below results message to the patient via .     Hi, Bobby,     Your lumbar spine MRI shows wearing down and a disc bulge at L5-S1, with narrowed space in the nerve roots; this hasn't substantially changed compared to your last MRI. There is also mild-moderate degeneration  at L3-L4 and L4-L5.     Please work with physical therapy as we discussed during your appointment and schedule a visit with the spine specialist to help with your ongoing symptoms (call [phone] to schedule).   Thank you,   FAITH Ralph CNP     KIERSTEN CARBAJAL RN on 3/16/2023 at 9:16 AM

## 2023-04-26 ENCOUNTER — OFFICE VISIT (OUTPATIENT)
Dept: PHYSICAL MEDICINE AND REHAB | Facility: CLINIC | Age: 78
End: 2023-04-26
Attending: NURSE PRACTITIONER
Payer: COMMERCIAL

## 2023-04-26 VITALS
DIASTOLIC BLOOD PRESSURE: 75 MMHG | SYSTOLIC BLOOD PRESSURE: 146 MMHG | WEIGHT: 146 LBS | HEART RATE: 95 BPM | HEIGHT: 57 IN | BODY MASS INDEX: 31.5 KG/M2

## 2023-04-26 DIAGNOSIS — M48.061 STENOSIS OF LATERAL RECESS OF LUMBAR SPINE: ICD-10-CM

## 2023-04-26 DIAGNOSIS — M51.369 DDD (DEGENERATIVE DISC DISEASE), LUMBAR: ICD-10-CM

## 2023-04-26 DIAGNOSIS — M54.16 LUMBAR RADICULAR PAIN: Primary | ICD-10-CM

## 2023-04-26 PROCEDURE — 99204 OFFICE O/P NEW MOD 45 MIN: CPT | Performed by: PHYSICAL MEDICINE & REHABILITATION

## 2023-04-26 RX ORDER — PREGABALIN 25 MG/1
25 CAPSULE ORAL AT BEDTIME
Qty: 30 CAPSULE | Refills: 1 | Status: SHIPPED | OUTPATIENT
Start: 2023-04-26 | End: 2024-01-05

## 2023-04-26 ASSESSMENT — PAIN SCALES - GENERAL: PAINLEVEL: NO PAIN (1)

## 2023-04-26 NOTE — LETTER
4/26/2023         RE: Bobby Ignacio  [address] [facility] [address]        Dear Colleague,    Thank you for referring your patient, Bobby Ignacio, to the [facility]. Please see a copy of my visit note below.      Assessment/Plan:      Bobby was seen today for back pain.    Diagnoses and all orders for this visit:    Lumbar radicular pain  -     pregabalin (LYRICA) 25 MG capsule; Take 1 capsule (25 mg) by mouth At Bedtime  -     Physical Therapy Referral; Future    Stenosis of lateral recess of lumbar spine  -     pregabalin (LYRICA) 25 MG capsule; Take 1 capsule (25 mg) by mouth At Bedtime  -     Physical Therapy Referral; Future    DDD (degenerative disc disease), lumbar  -     pregabalin (LYRICA) 25 MG capsule; Take 1 capsule (25 mg) by mouth At Bedtime  -     Physical Therapy Referral; Future    Other orders  -     Spine  Referral         Assessment: Pleasant 77 year old female with a history of thyroid disease depression, gastritis with:    1.  2 to 3-year history of progressive bilateral lower extremity pain and dysesthesias.  Consistent with lumbar radicular pain and paresthesias.  Bilateral S1 chronic radiculopathy and EMG with mild lateral recess stenosis left greater than right L5-S1 related to degenerative disc disease and broad-based disc bulge along with facet arthropathy.    2.  Poor sleep      Discussion:    1.  I discussed the diagnosis and treatment options.  Her symptoms have some characteristics consistent with a chronic radiculopathy but there are MRI findings are not as impressive when viewing the images as 1 would expect given her symptoms.  She does not have polyneuropathy on EMG but does have the chronic radiculopathy and therefore we will treat as S1  radiculopathy bilaterally.  We discussed that she has not had therapy we discussed medications injections and surgery.  She is not interested in surgical opinion or injections at  this time.    2.  Start physical therapy for lumbar core strengthening stabilization nerve glides.    3.  She has failed gabapentin.  Recommend trial of Lyrica 25 mg at bedtime.   checked and appropriate.  Order placed.    4.  Follow-up with me in 2 to 3 months.      It was our pleasure caring for your patient today, if there any questions or concerns please do not hesitate to contact us.      Subjective:   Patient ID: Bobby Ignacio is a 77 year old female.    History of Present Illness: Patient presents at the request of Daria Adorno CNP for evaluation of bilateral gluteal and lower extremity paresthesias and cramping through the lateral calves and dysesthesias of the bottom of her feet.  We have a telephone  today but she does much of the communication on her own.  She has had the symptoms bilaterally right equal to left for the past 2 to 3 years without any injury.  Waxes and wanes in intensity.  Worse in the winter months and with prolonged sitting better with standing or moving.  Her legs do feel weak at times.  She has cramping through her legs as well.  Pain is a 1/10 today.  She initially reports no prior treatment.  We then discussed things that have been done and she has had an EMG and MRI of the lumbar spine.  She has tried gabapentin initially 100 mg but then took 300 mg at bedtime for 1 month and that was not helpful and that was discontinued.  She reports no physical therapy has been done.  I did review  and she has had several prescriptions for gabapentin dating back to July 2022.        MRI lumbar spine images and report personally reviewed with the patient for medical decision-making purposes.  Moderate disc height loss L2-3 through L5-S1 mild at other levels.  At L5-S1 moderate disc bulge with facet arthropathy results in lateral recess stenosis bilaterally contact of the S1 nerves with moderate to severe bilateral foraminal stenosis.  L4-5 moderate disc height loss with facet  arthropathy no central canal or foraminal stenosis of significance.  L3-4 moderate disc height loss mild facet arthropathy mild foraminal stenosis.  Moderate disc height loss L2-3 with mild facet arthropathy no stenosis centrally or in the neuroforamen.  T12-L1 also mild disc height loss no significant stenosis or facet arthropathy.  On my review the most significant disc height loss is L5-S1 which I would consider moderate broad-based disc bulge resulting in mild lateral recess stenosis and contact of the left greater than right S1 nerves.  Moderate facet arthropathy.  There is a synovial cyst of the left L5-S1 facet mildly narrowing the left lateral recess.       Review of Systems: Denies fever, weight loss, waking, headache, change in vision, chest pain, shortness of breath, abdominal pain, nausea vomiting, bowel or bladder incontinence, skin issues, balance issues, lightheadedness, sleep issues. Remainder of 12 point review systems negative unless listed above.    Past Medical History:   Diagnosis Date     Chest pain 4/2014     Depression      Fatigue      Gastritis      Unspecified hypothyroidism     Hypothyroidism       Family History   Problem Relation Age of Onset     Cardiovascular Father         CHF     C.A.D. Father      Cardiovascular Brother         CHF     C.A.D. Brother      Cardiovascular Brother      Cardiovascular Brother      Glaucoma No family hx of      Macular Degeneration No family hx of          Social History     Socioeconomic History     Marital status:      Spouse name: None     Number of children: None     Years of education: None     Highest education level: None   Tobacco Use     Smoking status: Never     Smokeless tobacco: Never   Substance and Sexual Activity     Alcohol use: No     Drug use: No     Sexual activity: Yes     Partners: Male   Other Topics Concern     Exercise Yes     Social history: No tobacco or alcohol.      The following portions of the patient's history were  "reviewed and updated as appropriate: allergies, current medications, past family history, past medical history, past social history, past surgical history and problem list.    Oswestry (VEE) Questionnaire         View : No data to display.                Neck Disability Index:       View : No data to display.                   PHQ-2 Score:         3/9/2022     3:04 PM 9/30/2021     1:04 PM   PHQ-2 ( 1999 [employer])   Q1: Little interest or pleasure in doing things 0 0   Q2: Feeling down, depressed or hopeless 0 0   PHQ-2 Score 0 0   PHQ-2 Total Score (12-17 Years)- Positive if 3 or more points; Administer PHQ-A if positive  0                  Objective:   Physical Exam:    Ht 4' 9\" (1.448 m)   Wt 146 lb (66.2 kg)   BMI 31.59 kg/m    Body mass index is 31.59 kg/m .    General:  Well-appearing female in no acute distress.  Pleasant, cooperative, and interactive throughout the examination and interview.  CV: No lower extremity edema on inspection or paltation.  2+ posterior tibial pulses bilaterally.  Lymphatics: No cervical lymphadenopathy palpated. Eyes: sclera clear. Skin: No rashes or lesions seen over the head/neck, hairline, arms, legs, trunk.  Respirations unlabored.  MSK: Gait is nonantalgic.  Able to heel-toe stand without difficulty.  Negative Romberg.  Spine: normal AP curves of the C, T, and L spine.  Full range of motion in the Lumbar spine in all flexion and extension.  Palpation: No tenderness over the lumbar spine or gluteal tissues.  Extremities: Full range of motion of the elbows, and wrists with no effusions or tenderness to palpation.   Full range of motion of the hips, knees, and ankles with no effusions or tenderness to palpation.    No hypermobility of the upper or lower extremities.  Neurologic exam: Mental status: Patient is alert and oriented with normal affect.  Attention, knowledge, memory, and language are intact.  Normal coordination throughout the examination.  Reflexes are 2+ and " symmetric biceps, triceps, brachioradialis, patellar, and trace Achilles with down-going toes and Negative Rob's.  Sensation is intact to light touch throughout the upper and lower extremities bilaterally.  Manual muscle testing reveals 5 out of 5 in the hip flexors, knee flexors/extensors, ankle plantar flexors, ankle  dorsiflexors, and EHL.  Upper extremities: Grossly normal strength . Normal muscle bulk and tone in the arms and legs.    Negative seated   straight leg raise bilaterally.          Again, thank you for allowing me to participate in the care of your patient.        Sincerely,        Darwin Cruz, DO

## 2023-04-26 NOTE — PROGRESS NOTES
Assessment/Plan:      Bobby was seen today for back pain.    Diagnoses and all orders for this visit:    Lumbar radicular pain  -     pregabalin (LYRICA) 25 MG capsule; Take 1 capsule (25 mg) by mouth At Bedtime  -     Physical Therapy Referral; Future    Stenosis of lateral recess of lumbar spine  -     pregabalin (LYRICA) 25 MG capsule; Take 1 capsule (25 mg) by mouth At Bedtime  -     Physical Therapy Referral; Future    DDD (degenerative disc disease), lumbar  -     pregabalin (LYRICA) 25 MG capsule; Take 1 capsule (25 mg) by mouth At Bedtime  -     Physical Therapy Referral; Future    Other orders  -     Spine  Referral         Assessment: Pleasant 77 year old female with a history of thyroid disease depression, gastritis with:    1.  2 to 3-year history of progressive bilateral lower extremity pain and dysesthesias.  Consistent with lumbar radicular pain and paresthesias.  Bilateral S1 chronic radiculopathy and EMG with mild lateral recess stenosis left greater than right L5-S1 related to degenerative disc disease and broad-based disc bulge along with facet arthropathy.    2.  Poor sleep      Discussion:    1.  I discussed the diagnosis and treatment options.  Her symptoms have some characteristics consistent with a chronic radiculopathy but there are MRI findings are not as impressive when viewing the images as 1 would expect given her symptoms.  She does not have polyneuropathy on EMG but does have the chronic radiculopathy and therefore we will treat as S1  radiculopathy bilaterally.  We discussed that she has not had therapy we discussed medications injections and surgery.  She is not interested in surgical opinion or injections at this time.    2.  Start physical therapy for lumbar core strengthening stabilization nerve glides.    3.  She has failed gabapentin.  Recommend trial of Lyrica 25 mg at bedtime.   checked and appropriate.  Order placed.    4.  Follow-up with me in 2 to 3  months.      It was our pleasure caring for your patient today, if there any questions or concerns please do not hesitate to contact us.      Subjective:   Patient ID: Bobby Ignacio is a 77 year old female.    History of Present Illness: Patient presents at the request of Daria Adorno CNP for evaluation of bilateral gluteal and lower extremity paresthesias and cramping through the lateral calves and dysesthesias of the bottom of her feet.  We have a telephone  today but she does much of the communication on her own.  She has had the symptoms bilaterally right equal to left for the past 2 to 3 years without any injury.  Waxes and wanes in intensity.  Worse in the winter months and with prolonged sitting better with standing or moving.  Her legs do feel weak at times.  She has cramping through her legs as well.  Pain is a 1/10 today.  She initially reports no prior treatment.  We then discussed things that have been done and she has had an EMG and MRI of the lumbar spine.  She has tried gabapentin initially 100 mg but then took 300 mg at bedtime for 1 month and that was not helpful and that was discontinued.  She reports no physical therapy has been done.  I did review  and she has had several prescriptions for gabapentin dating back to July 2022.        MRI lumbar spine images and report personally reviewed with the patient for medical decision-making purposes.  Moderate disc height loss L2-3 through L5-S1 mild at other levels.  At L5-S1 moderate disc bulge with facet arthropathy results in lateral recess stenosis bilaterally contact of the S1 nerves with moderate to severe bilateral foraminal stenosis.  L4-5 moderate disc height loss with facet arthropathy no central canal or foraminal stenosis of significance.  L3-4 moderate disc height loss mild facet arthropathy mild foraminal stenosis.  Moderate disc height loss L2-3 with mild facet arthropathy no stenosis centrally or in the neuroforamen.   T12-L1 also mild disc height loss no significant stenosis or facet arthropathy.  On my review the most significant disc height loss is L5-S1 which I would consider moderate broad-based disc bulge resulting in mild lateral recess stenosis and contact of the left greater than right S1 nerves.  Moderate facet arthropathy.  There is a synovial cyst of the left L5-S1 facet mildly narrowing the left lateral recess.       Review of Systems: Denies fever, weight loss, waking, headache, change in vision, chest pain, shortness of breath, abdominal pain, nausea vomiting, bowel or bladder incontinence, skin issues, balance issues, lightheadedness, sleep issues. Remainder of 12 point review systems negative unless listed above.    Past Medical History:   Diagnosis Date     Chest pain 4/2014     Depression      Fatigue      Gastritis      Unspecified hypothyroidism     Hypothyroidism       Family History   Problem Relation Age of Onset     Cardiovascular Father         CHF     C.A.D. Father      Cardiovascular Brother         CHF     C.A.D. Brother      Cardiovascular Brother      Cardiovascular Brother      Glaucoma No family hx of      Macular Degeneration No family hx of          Social History     Socioeconomic History     Marital status:      Spouse name: None     Number of children: None     Years of education: None     Highest education level: None   Tobacco Use     Smoking status: Never     Smokeless tobacco: Never   Substance and Sexual Activity     Alcohol use: No     Drug use: No     Sexual activity: Yes     Partners: Male   Other Topics Concern     Exercise Yes     Social history: No tobacco or alcohol.      The following portions of the patient's history were reviewed and updated as appropriate: allergies, current medications, past family history, past medical history, past social history, past surgical history and problem list.    Oswestry (VEE) Questionnaire         View : No data to display.           "      Neck Disability Index:       View : No data to display.                   PHQ-2 Score:         3/9/2022     3:04 PM 9/30/2021     1:04 PM   PHQ-2 ( 1999 [employer])   Q1: Little interest or pleasure in doing things 0 0   Q2: Feeling down, depressed or hopeless 0 0   PHQ-2 Score 0 0   PHQ-2 Total Score (12-17 Years)- Positive if 3 or more points; Administer PHQ-A if positive  0                  Objective:   Physical Exam:    Ht 4' 9\" (1.448 m)   Wt 146 lb (66.2 kg)   BMI 31.59 kg/m    Body mass index is 31.59 kg/m .    General:  Well-appearing female in no acute distress.  Pleasant, cooperative, and interactive throughout the examination and interview.  CV: No lower extremity edema on inspection or paltation.  2+ posterior tibial pulses bilaterally.  Lymphatics: No cervical lymphadenopathy palpated. Eyes: sclera clear. Skin: No rashes or lesions seen over the head/neck, hairline, arms, legs, trunk.  Respirations unlabored.  MSK: Gait is nonantalgic.  Able to heel-toe stand without difficulty.  Negative Romberg.  Spine: normal AP curves of the C, T, and L spine.  Full range of motion in the Lumbar spine in all flexion and extension.  Palpation: No tenderness over the lumbar spine or gluteal tissues.  Extremities: Full range of motion of the elbows, and wrists with no effusions or tenderness to palpation.   Full range of motion of the hips, knees, and ankles with no effusions or tenderness to palpation.    No hypermobility of the upper or lower extremities.  Neurologic exam: Mental status: Patient is alert and oriented with normal affect.  Attention, knowledge, memory, and language are intact.  Normal coordination throughout the examination.  Reflexes are 2+ and symmetric biceps, triceps, brachioradialis, patellar, and trace Achilles with down-going toes and Negative Rob's.  Sensation is intact to light touch throughout the upper and lower extremities bilaterally.  Manual muscle testing reveals 5 out of 5 in " the hip flexors, knee flexors/extensors, ankle plantar flexors, ankle  dorsiflexors, and EHL.  Upper extremities: Grossly normal strength . Normal muscle bulk and tone in the arms and legs.    Negative seated   straight leg raise bilaterally.

## 2023-04-26 NOTE — PATIENT INSTRUCTIONS
A physical therapy order was provided for you today.  You will be contacted by physical therapy.  If nobody contacts you within 3 to 5 days, please contact the clinic at [phone].  It will be very important for you to do your physical therapy exercises on a regular basis to decrease your pain and prevent future pain flares.     Start Lyrica 25 mg at bedtime to help with your leg pain

## 2023-05-15 ENCOUNTER — OFFICE VISIT (OUTPATIENT)
Dept: OPHTHALMOLOGY | Facility: CLINIC | Age: 78
End: 2023-05-15
Attending: STUDENT IN AN ORGANIZED HEALTH CARE EDUCATION/TRAINING PROGRAM
Payer: COMMERCIAL

## 2023-05-15 DIAGNOSIS — H52.13 MYOPIA OF BOTH EYES WITH ASTIGMATISM AND PRESBYOPIA: ICD-10-CM

## 2023-05-15 DIAGNOSIS — Z96.1 PSEUDOPHAKIA, BOTH EYES: ICD-10-CM

## 2023-05-15 DIAGNOSIS — H02.886 MEIBOMIAN GLAND DYSFUNCTION (MGD) OF BOTH EYES: ICD-10-CM

## 2023-05-15 DIAGNOSIS — H04.123 DRY EYES, BILATERAL: ICD-10-CM

## 2023-05-15 DIAGNOSIS — H02.883 MEIBOMIAN GLAND DYSFUNCTION (MGD) OF BOTH EYES: ICD-10-CM

## 2023-05-15 DIAGNOSIS — H43.813 POSTERIOR VITREOUS DETACHMENT OF BOTH EYES: ICD-10-CM

## 2023-05-15 DIAGNOSIS — H52.203 MYOPIA OF BOTH EYES WITH ASTIGMATISM AND PRESBYOPIA: ICD-10-CM

## 2023-05-15 DIAGNOSIS — H52.4 MYOPIA OF BOTH EYES WITH ASTIGMATISM AND PRESBYOPIA: ICD-10-CM

## 2023-05-15 DIAGNOSIS — H10.13 ALLERGIC CONJUNCTIVITIS OF BOTH EYES: Primary | ICD-10-CM

## 2023-05-15 PROCEDURE — 99213 OFFICE O/P EST LOW 20 MIN: CPT | Performed by: STUDENT IN AN ORGANIZED HEALTH CARE EDUCATION/TRAINING PROGRAM

## 2023-05-15 PROCEDURE — G0463 HOSPITAL OUTPT CLINIC VISIT: HCPCS | Performed by: STUDENT IN AN ORGANIZED HEALTH CARE EDUCATION/TRAINING PROGRAM

## 2023-05-15 RX ORDER — OLOPATADINE HYDROCHLORIDE 2 MG/ML
1 SOLUTION/ DROPS OPHTHALMIC DAILY
Qty: 2.5 ML | Refills: 11 | Status: SHIPPED | OUTPATIENT
Start: 2023-05-15

## 2023-05-15 ASSESSMENT — CONF VISUAL FIELD
OD_NORMAL: 1
OS_SUPERIOR_NASAL_RESTRICTION: 0
OS_INFERIOR_NASAL_RESTRICTION: 0
OS_NORMAL: 1
OS_INFERIOR_TEMPORAL_RESTRICTION: 0
OD_SUPERIOR_NASAL_RESTRICTION: 0
OD_INFERIOR_NASAL_RESTRICTION: 0
OS_SUPERIOR_TEMPORAL_RESTRICTION: 0
OD_SUPERIOR_TEMPORAL_RESTRICTION: 0
OD_INFERIOR_TEMPORAL_RESTRICTION: 0
METHOD: COUNTING FINGERS

## 2023-05-15 ASSESSMENT — TONOMETRY
OS_IOP_MMHG: 15
OD_IOP_MMHG: 13
IOP_METHOD: ICARE

## 2023-05-15 ASSESSMENT — VISUAL ACUITY
OS_SC: 20/25
OS_SC+: +1
METHOD: SNELLEN - LINEAR
OD_SC: 20/40
OD_SC+: +3

## 2023-05-15 ASSESSMENT — REFRACTION_MANIFEST
OS_AXIS: 005
OD_SPHERE: -0.75
OD_CYLINDER: +0.50
OS_CYLINDER: +0.50
OD_AXIS: 160
OS_ADD: +2.50
OS_SPHERE: -1.00
OD_ADD: +2.50

## 2023-05-15 ASSESSMENT — CUP TO DISC RATIO
OS_RATIO: 0.3
OD_RATIO: 0.3

## 2023-05-15 NOTE — PROGRESS NOTES
HPI     COMPREHENSIVE EYE EXAM    In both eyes.  Associated symptoms include redness, tearing and itching.  Treatments tried include eye drops.  Response to treatment was no improvement. Additional comments: Comprehensive eye exam.  IOL surgery each eye almost 1 year ago           Comments    Eye meds: previously prednislone TID each eye, not using regularly now  Itching and watery eyes since IOL surgery  Told to use prednisolone, not working for itch per patient.    ROLLY Sutherland 5/15/2023 1:59 PM                  Last edited by Isa Gallagher MD on 5/21/2023 12:57 PM.          Review of systems for the eyes was negative other than the pertinent positives/negatives listed in the HPI.    Ocular Meds: ATs prn OU    Ocular Hx: pseudophakia OU; PVD OU; allergic conjunctivitis OU    FOHx: no family history of glaucoma or blindness    PMHx:   Past Medical History:   Diagnosis Date     Chest pain 4/2014     Depression      Fatigue      Gastritis      Unspecified hypothyroidism     Hypothyroidism       Assessment & Plan      Bobby Ignacio is a 77 year old female with the following diagnoses: family here to help interpret     Allergic Conjunctivitis OU  MGD/Dry Eyes OU  - warm compresses BID OU x 5-10 min each time  - olopatadine 0.2% daily OU  - PFATs QID and prn OU  - artificial tear gel or ointment at bedtime OU    Pseudophakia OU  - observe    PVD OU  - previously noted  - no holes, tears, or detachment on exam OU  - RD precautions reviewed    Dermatochalasis, bilateral upper eyelids  - referral to oculoplastics    Myopia of both eyes with astigmatism and presbyopia  - refraction reviewed and dispensed today per patient request    Return/RD precautions reviewed    Patient disposition:   Return for 1 year general; next available oculoplastics . or sooner changes      Attending Physician Attestation:  Complete documentation of historical and exam elements from today's encounter can be found in the full encounter  summary report (not reduplicated in this progress note).  I personally obtained the chief complaint(s) and history of present illness.  I confirmed and edited as necessary the review of systems, past medical/surgical history, family history, social history, and examination findings as documented by others; and I examined the patient myself.  I personally reviewed the relevant tests, images, and reports as documented above.  I formulated and edited as necessary the assessment and plan and discussed the findings and management plan with the patient and family. . - Isa Gallagher MD

## 2023-05-15 NOTE — NURSING NOTE
Chief Complaints and History of Present Illnesses   Patient presents with     COMPREHENSIVE EYE EXAM     Comprehensive eye exam.  IOL surgery each eye 2-3 months ago at clinic in [place]      Chief Complaint(s) and History of Present Illness(es)     COMPREHENSIVE EYE EXAM            Laterality: both eyes    Associated symptoms: redness, tearing and itching    Treatments tried: eye drops    Response to treatment: no improvement    Comments: Comprehensive eye exam.  IOL surgery each eye 2-3 months ago at clinic in [place]          Comments    Eye meds: prednislone TID each eye  Itching and watery eyes since IOL surgery in December 2022.  Told to use prednisolone, not working for itch per patient.    ROLLY Sutherland 5/15/2023 1:59 PM

## 2023-05-15 NOTE — PATIENT INSTRUCTIONS
Use preservative free artificial tears one drop four times a day and as needed for comfort to both eyes; some brands include: refresh, systane, thera tears, blink, etc    Use artificial tear gel or ointment, one small amount to both eyes at bedtime; some brands include: genteal gel tears, refresh PM, systane nighttime ointment, etc    Use Olopatadine 0.2% one drop to both eyes daily    DO NOT use eye drops that say 'take the red out' including Visine or Clear Eyes    Do warm compresses at least two times a day to both eyelids for 5-10 min each time

## 2023-05-21 ASSESSMENT — EXTERNAL EXAM - LEFT EYE: OS_EXAM: BROW PTOSIS

## 2023-05-21 ASSESSMENT — EXTERNAL EXAM - RIGHT EYE: OD_EXAM: BROW PTOSIS

## 2023-07-13 ENCOUNTER — TELEPHONE (OUTPATIENT)
Dept: CARDIOLOGY | Facility: CLINIC | Age: 78
End: 2023-07-13
Payer: COMMERCIAL

## 2023-07-13 NOTE — TELEPHONE ENCOUNTER
[facility] Call Center    Phone Message    May a detailed message be left on voicemail: yes     Reason for Call: Other: Yessi called on her mother's behalf requesting to schedule a cardioversion and echo AZAR for her mother based on Bobby's office visit with Dr. Barrera back on 9/30/2021. Please reach out to Yessi to discuss getting these set up and scheduled for her mother. Thank you!     Action Taken: Other: Cardiology    Travel Screening: Not Applicable     Thank you!  [facility]

## 2023-07-18 ENCOUNTER — OFFICE VISIT (OUTPATIENT)
Dept: CARDIOLOGY | Facility: CLINIC | Age: 78
End: 2023-07-18
Payer: COMMERCIAL

## 2023-07-18 VITALS — SYSTOLIC BLOOD PRESSURE: 136 MMHG | DIASTOLIC BLOOD PRESSURE: 85 MMHG | OXYGEN SATURATION: 96 % | HEART RATE: 83 BPM

## 2023-07-18 DIAGNOSIS — I48.19 PERSISTENT ATRIAL FIBRILLATION (H): Primary | ICD-10-CM

## 2023-07-18 PROCEDURE — 99215 OFFICE O/P EST HI 40 MIN: CPT

## 2023-07-18 PROCEDURE — 93000 ELECTROCARDIOGRAM COMPLETE: CPT

## 2023-07-18 RX ORDER — LIDOCAINE 40 MG/G
CREAM TOPICAL
Status: CANCELLED | OUTPATIENT
Start: 2023-07-18

## 2023-07-18 RX ORDER — POTASSIUM CHLORIDE 1500 MG/1
40 TABLET, EXTENDED RELEASE ORAL
Status: CANCELLED | OUTPATIENT
Start: 2023-07-18

## 2023-07-18 RX ORDER — POTASSIUM CHLORIDE 1500 MG/1
20 TABLET, EXTENDED RELEASE ORAL
Status: CANCELLED | OUTPATIENT
Start: 2023-07-18

## 2023-07-18 NOTE — NURSING NOTE
Patient to have cardioversion in 4 weeks. Orders already placed by provider.    Patient left clinic before instructions could be reviewed with patient. RN to review instructions when patient is scheduled for cardioversion.    Annel Manley RN, BSN  Cardiology RN Care Coordinator   [place]   Phone: [phone]  Fax: [phone] ([place]) [phone] ([place])

## 2023-07-18 NOTE — NURSING NOTE
Bobby Ignacio's goals for this visit include:   Chief Complaint   Patient presents with     Follow Up     Have not seen Dr. Barrera since 2021  Discuss getting Cardioversion and AZAR  recently saw PCP and was told that heart is not doing very well, has been waking up with headaches        She requests these members of her care team be copied on today's visit information: yes     PCP: Sherie Barrera    Referring Provider:  No referring provider defined for this encounter.    /85 (BP Location: Right arm, Patient Position: Chair, Cuff Size: Adult Regular)   Pulse 83   SpO2 96%     Do you need any medication refills at today's visit? No       LUCIANA Hartman   Cardiology Team  [facility]

## 2023-07-18 NOTE — PROGRESS NOTES
ELECTROPHYSIOLOGY CLINIC VISIT    Assessment/Recommendations   Assessment/Plan:    Bobby Ignacio is a 78 year old female with past medical history signifcant for hypothyroidism, hyperlipidemia, type II diabetes (diet controlled) and atrial fibrillation.       Persistent Atrial Fibrillation   We discussed in detail with the patient management/treatment options for A.fib includin. Stroke Prophylaxis:  CHADSVASC= +female ++age, +/-DM (diet controlled)  3, corresponding to a 3.2% annual stroke / systemic emolism event rate. indicating need for long term oral anticoagulation.  Continue eliquis 5 mg twice daily (77 yo, Cr 0.67, 146 lbs). Reports she has only been taking it once a day. Discussed importance of compliance with eliquis for appropriate reduction of stroke risk. She voices understanding that she needs to take it twice daily, with no missed doses for 4 weeks prior to cardioversion.   2. Rate Control: Continue Toprol XL 25 mg daily.   3. Rhythm Control: Cardioversion, Antiarrhythmics and/or ablation are options for rhythm control. Discussed treatment options with patient. She has likely been in A fib now since 2021. Daughter voices concern that her activity level has been more limited in A fib and she is unable to walk as far, however, pt doesn't noticed a difference. Denies any associated peripheral edema, palpitations, chest pressure or lightheadedness. Overall symptom burden unclear. I think it is reasonable to pursue cardioversion, if no improvement in dyspnea on exertion following cardioversion, would favor rate control strategy if echo demonstrates preserved LVEF.   4. Risk Factor Management: Statin, BP control, and LUCI evaluation.        Schedule cardioversion, no AZAR, in 4 weeks.   Follow up 1 mo after DCCV.   Echo after cardioversion.      History of Present Illness/Subjective    MsJyothi Ignacio is a 78 year old female who comes in today for EP follow-up of atrial  fibrillation.    Initially seen by Dr Barrera 9/30/21 after A fib was discovered in ER. She reported chest pain and left shoulder pain which promopted ER visit. Troponin was negative x2 and her chest pain, EKG demosntrated rate controlled A fib. She was started on eliquis and discharged home with cardiology referral. When she saw Dr Barrera on 9/30, she was not having lightheadedness, dizziness of palptations. She did have chronic shortness of breath, able to walk 1-2 flights of starts or 1-2 blocks before stopping to rest. Unsure of how long she had been in A fib, likely from August to September 2021. Toprol XL 25 mg started. They discussed cardioversion to discuss symptoms burden of A fib.     She presents today for follow up, last seen by EP in 2021. She was seen by PCP 7/16/23 for diabetes follow up, had stopped taking eliquis and metoprolol 2 months ago, both were restarted at this visit. States she stopped it because of a rash, she thought it may be associated with one of the medications but it was not. Per daughter provider noted irregular rhythm on exam. She reports chronic dyspnea on exertion, daughter feels it has worsened over the past couple of years. Able to walk around the block. She denies chest discomfort, palpitations, abdominal fullness/bloating or peripheral edema, orthopnea, lightheadedness, dizziness or pre-syncope. Presenting 12 lead ECG shows A fib Vent Rate 80 bpm,  ms, QTc 461 ms. Current cardiac medications include: Eliquis and metoprolol.     I have reviewed and updated the patient's Past Medical History, Social History, Family History and Medication List.     Cardiographics (Personally Reviewed) :     NM Stress Test: 7/23/2015  sinus rhythm, RBBB, no ischemia    EKG   TODAY 7/18/23: A fib 80 bpm  9/30/2021:  bpm  8/10/2021:   11/20/2020: HR 85       Physical Examination   There were no vitals taken for this visit.  Wt Readings from Last 3 Encounters:   04/26/23 66.2 kg (146  lb)   02/08/23 67.1 kg (148 lb)   05/03/22 68.4 kg (150 lb 11.2 oz)   /85 (BP Location: Right arm, Patient Position: Chair, Cuff Size: Adult Regular)   Pulse 83   SpO2 96%     General Appearance:   Alert, well-appearing and in no acute distress.   HEENT: Atraumatic, normocephalic. MMM.   Chest/Lungs:   Respirations unlabored.  Lungs are clear to auscultation.   Cardiovascular:   Irregular rhythm.  S1/S2. No murmur.    Abdomen:  Soft, nontender, nondistended.   Extremities: No cyanosis or clubbing. No edema.    Musculoskeletal: Moves all extremities.  Gait appropriate.   Skin: Warm, dry, intact.    Neurologic: Mood and affect are appropriate.  Alert and oriented to person, place, time, and situation.          Medications  Allergies   Eliquis 5 mg twice daily   Toprol XL 25 mg daily     Gabapentin   Synthroid   Zoloft   Lyrica  Allergies   Allergen Reactions     Aspirin      GI UPSET  States she can tolerate single doses of enteric coated      Aspirin-Butalbital-Caffeine [Butalbital-Aspirin-Caffeine]      sensatitive stomach     Codeine      UPSET STOMACHE     Gabapentin Other (See Comments)     Depression         Lab Results (Personally Reviewed)    Chemistry/lipid CBC Cardiac Enzymes/BNP/TSH/INR   Lab Results   Component Value Date    BUN 17 05/03/2022     05/03/2022    CO2 30 05/03/2022     Creatinine   Date Value Ref Range Status   05/03/2022 0.65 0.52 - 1.04 mg/dL Final   03/27/2013 0.60 0.52 - 1.04 mg/dL Final       Lab Results   Component Value Date    CHOL 122 02/08/2023    HDL 45 (L) 02/08/2023    LDL 59 02/08/2023      Lab Results   Component Value Date    WBC 7.6 05/03/2022    HGB 13.6 05/03/2022    HCT 40.5 05/03/2022    MCV 88 05/03/2022     05/03/2022    Lab Results   Component Value Date    TSH 0.65 02/08/2023        The patient states understanding and is agreeable with the plan.     Theresa Ross PA-C  [facility]  Electrophysiology Consult  Service  Pager: [phone]    I spent a total of 30 minutes face to face with  Bobby Ignacio during today's office visit. I have spent an additional 30 minutes today on chart review and documentation.

## 2023-07-18 NOTE — PATIENT INSTRUCTIONS
Take your medicines every day, as directed     Changes made today:  Cardioversion in 4 weeks       Cardiology Care Coordinators:      Beatriz BROOKE RN     Cardiology Rooming staff:  Chase CHOWDHURY    Phone  [phone]      Fax [phone]    To Contact us     During Business Hours:  [phone]     If you are needing refills please contact your pharmacy.     For urgent after hour care please call the [facility] at [phone] or the [facility] at [phone] and ask to speak to the cardiologist on call.            HOW TO CHECK YOUR BLOOD PRESSURE AT HOME:     Avoid eating, smoking, and exercising for at least 30 minutes before taking a reading.     Be sure you have taken your BP medication at least 2-3 hours before you check it.      Sit quietly for 10 minutes before a reading.      Sit in a chair with your feet flat on the floor. Rest your  arm on a table so that the arm cuff is at the same level as your heart.     Remain still during the reading.  Record your blood pressure and pulse in a log and bring to your next appointment.       Use [employer] allows you to communicate directly with your heart team through secure messaging.  [place] can be accessed any time on your phone, computer, or tablet.  If you need assistance, we'd be happy to help!             Keep your Heart Appointments:     Cardioversion

## 2023-07-19 ENCOUNTER — TELEPHONE (OUTPATIENT)
Dept: CARDIOLOGY | Facility: CLINIC | Age: 78
End: 2023-07-19
Payer: COMMERCIAL

## 2023-07-19 DIAGNOSIS — I48.19 PERSISTENT ATRIAL FIBRILLATION (H): Primary | ICD-10-CM

## 2023-07-19 NOTE — TELEPHONE ENCOUNTER
EP  spoke to patient with  but the patient did respond in english. She informed the  that she's shopping and would like a call back, the EP  did inform her that she will call again on Monday and the patient agreed that would work.     Nancy Duque  [facility] Electrophysiology   [phone]

## 2023-07-24 NOTE — TELEPHONE ENCOUNTER
Another message was left for the patient with the call back number.     Nancy Duque  [facility]   [phone]

## 2023-07-25 NOTE — TELEPHONE ENCOUNTER
Another message left. Patient isn't responding to any of the messages left. Will send out a letter today.     Nancy Duque  [facility]   [phone]

## 2023-07-26 ENCOUNTER — HOSPITAL ENCOUNTER (OUTPATIENT)
Facility: CLINIC | Age: 78
End: 2023-07-26
Payer: COMMERCIAL

## 2023-07-27 ENCOUNTER — TELEPHONE (OUTPATIENT)
Dept: CARDIOLOGY | Facility: CLINIC | Age: 78
End: 2023-07-27
Payer: COMMERCIAL

## 2023-07-27 NOTE — TELEPHONE ENCOUNTER
Patient is tentatively scheduled for cardioversion on 8/22/23. Below are instructions to be reviewed with patient.  Patient is to also be scheduled for an echo after cardioversion.  Attempted to call patient with Edward .  answered phone but patient was not present. No consent on file to relay information to .  Informed patient's  that an attempt would be made when patient is present.    Cardioversion instructions    You are scheduled for a Cardioversion at the [facility] ([address], [facility] [address], [phone]).         Visitor Policy: Two visitors.    Follow these instructions:    1. Report to the GOLD waiting room in the [facility] hospital on    2. Please do not eat anything for 8 hours prior to your procedure. You may have sips of water up until 2 hours prior to your arrival.    3. The morning of your procedure you may take your scheduled medications with a SIP of water. If you take diabetic medications or a diuretic, you may hold these.     4. You will receive medication that makes you sleepy; you will need a .    You should not make any legal decisions for 24 hours following discharge.       If your question concerns the schedule/appointment times, contact:  LETICIA Khalil Procedure   [phone]

## 2023-08-04 NOTE — TELEPHONE ENCOUNTER
Called patient with Edward .  answered, speaking English. He asked for date and time of cardioversion which was given, and address was also given to the [facility]. He asked for a phone number to reach us and then hung up the phone.     Will send a letter since it has been difficult to reach the patient by phone.     Beatriz Peace RN  Cardiology Care Coordinator  [facility]  Phone: [phone]

## 2023-08-17 ENCOUNTER — TELEPHONE (OUTPATIENT)
Dept: CARDIOLOGY | Facility: CLINIC | Age: 78
End: 2023-08-17
Payer: COMMERCIAL

## 2023-08-17 NOTE — TELEPHONE ENCOUNTER
Attempted to call patient for pre-procedure call for cardioversion on 8/22. Unable to reach patient with Edward .  Letter with below instructions already mailed to patient.      You are scheduled for a Cardioversion at the [facility] ([address], [facility] [address], [phone]).         Visitor Policy: Two visitors.    Follow these instructions:    1. Report to the GOLD waiting room in the [facility] hospital on: 8/22/23 at 11:30am    2. Please do not eat anything for 8 hours prior to your procedure. You may have sips of water up until 2 hours prior to your arrival.    3. The morning of your procedure you may take your scheduled medications with a SIP of water. If you take diabetic medications or a diuretic, you may hold these.     4. You will receive medication that makes you sleepy; you will need a .    You should not make any legal decisions for 24 hours following discharge.       If you have further questions, please utilize [employer] to contact us.   If your question concerns the above instructions, contact:  Nurse Coordinator.  [phone]    If your question concerns the schedule/appointment times, contact:  LETICIA Khalil Procedure   [phone]       Annel Manley, RN, BSN  Cardiology RN Care Coordinator   [place]   Phone: [phone]  Fax: [phone] ([place]) [phone] ([place])

## 2023-08-17 NOTE — CONFIDENTIAL NOTE
You are scheduled for a Cardioversion at the [facility] ([address], [facility] [address], [phone]).         Visitor Policy: Two visitors.    Follow these instructions:    1. Report to the GOLD waiting room in the [facility] on: __________    2. Please do not eat anything for 8 hours prior to your procedure. You may have sips of water up until 2 hours prior to your arrival.    3. The morning of your procedure you may take your scheduled medications with a SIP of water. If you take diabetic medications or a diuretic, you may hold these.     4. You will receive medication that makes you sleepy; you will need a .    You should not make any legal decisions for 24 hours following discharge.       If you have further questions, please utilize [email or web address] to contact us.   If your question concerns the above instructions, contact:  Jessica Jiménez RN   Electrophysiology Nurse Coordinator.  [phone]    If your question concerns the schedule/appointment times, contact:  LETICIA Khalil Procedure   [phone]

## 2023-08-21 ENCOUNTER — TELEPHONE (OUTPATIENT)
Dept: CARDIOLOGY | Facility: CLINIC | Age: 78
End: 2023-08-21

## 2023-08-21 NOTE — TELEPHONE ENCOUNTER
Called with Edward  to discuss procedure scheduled for tomorrow. Patient's  answered the phone and stated that patient is out of town. He states that patient will not be back in time for tomorrow's appointment and wishes to cancel tomorrow appointment as patient will not make it. [facility]  stated they would call when patient is back in town. Informed patient that EP  and provider would be updated.    Annel Manley RN, BSN  Cardiology RN Care Coordinator   [place]   Phone: [phone]  Fax: [phone] ([place]) [phone] ([place])

## 2023-08-31 ENCOUNTER — ANCILLARY PROCEDURE (OUTPATIENT)
Dept: CARDIOLOGY | Facility: CLINIC | Age: 78
End: 2023-08-31
Payer: COMMERCIAL

## 2023-08-31 DIAGNOSIS — I48.19 PERSISTENT ATRIAL FIBRILLATION (H): ICD-10-CM

## 2023-08-31 LAB — LVEF ECHO: NORMAL

## 2023-08-31 PROCEDURE — 93306 TTE W/DOPPLER COMPLETE: CPT | Performed by: INTERNAL MEDICINE

## 2023-09-01 ENCOUNTER — DOCUMENTATION ONLY (OUTPATIENT)
Dept: CARDIOLOGY | Facility: CLINIC | Age: 78
End: 2023-09-01
Payer: COMMERCIAL

## 2023-09-01 DIAGNOSIS — R93.1 ABNORMAL ECHOCARDIOGRAM: Primary | ICD-10-CM

## 2023-09-02 ENCOUNTER — HOSPITAL ENCOUNTER (EMERGENCY)
Facility: CLINIC | Age: 78
Discharge: HOME | End: 2023-09-02
Attending: STUDENT IN AN ORGANIZED HEALTH CARE EDUCATION/TRAINING PROGRAM | Admitting: STUDENT IN AN ORGANIZED HEALTH CARE EDUCATION/TRAINING PROGRAM
Payer: COMMERCIAL

## 2023-09-02 ENCOUNTER — APPOINTMENT (OUTPATIENT)
Dept: CT IMAGING | Facility: CLINIC | Age: 78
End: 2023-09-02
Attending: STUDENT IN AN ORGANIZED HEALTH CARE EDUCATION/TRAINING PROGRAM
Payer: COMMERCIAL

## 2023-09-02 VITALS
DIASTOLIC BLOOD PRESSURE: 72 MMHG | HEART RATE: 73 BPM | TEMPERATURE: 97.7 F | RESPIRATION RATE: 16 BRPM | SYSTOLIC BLOOD PRESSURE: 133 MMHG | OXYGEN SATURATION: 96 %

## 2023-09-02 DIAGNOSIS — I50.812 CHRONIC RIGHT-SIDED HEART FAILURE (H): ICD-10-CM

## 2023-09-02 LAB
ANION GAP SERPL CALCULATED.3IONS-SCNC: 9 MMOL/L (ref 7–15)
APTT PPP: 32 SECONDS (ref 22–38)
ATRIAL RATE - MUSE: 48 BPM
BASOPHILS # BLD AUTO: 0 10E3/UL (ref 0–0.2)
BASOPHILS NFR BLD AUTO: 1 %
BUN SERPL-MCNC: 23 MG/DL (ref 8–23)
CALCIUM SERPL-MCNC: 9.4 MG/DL (ref 8.8–10.2)
CHLORIDE SERPL-SCNC: 106 MMOL/L (ref 98–107)
CREAT SERPL-MCNC: 0.68 MG/DL (ref 0.51–0.95)
DEPRECATED HCO3 PLAS-SCNC: 25 MMOL/L (ref 22–29)
DIASTOLIC BLOOD PRESSURE - MUSE: NORMAL MMHG
EOSINOPHIL # BLD AUTO: 0.2 10E3/UL (ref 0–0.7)
EOSINOPHIL NFR BLD AUTO: 3 %
ERYTHROCYTE [DISTWIDTH] IN BLOOD BY AUTOMATED COUNT: 13.2 % (ref 10–15)
FIBRINOGEN PPP-MCNC: 326 MG/DL (ref 170–490)
GFR SERPL CREATININE-BSD FRML MDRD: 89 ML/MIN/1.73M2
GLUCOSE SERPL-MCNC: 114 MG/DL (ref 70–99)
HCT VFR BLD AUTO: 36.5 % (ref 35–47)
HGB BLD-MCNC: 12.2 G/DL (ref 11.7–15.7)
HOLD SPECIMEN: NORMAL
IMM GRANULOCYTES # BLD: 0 10E3/UL
IMM GRANULOCYTES NFR BLD: 0 %
INR PPP: 1.22 (ref 0.85–1.15)
INTERPRETATION ECG - MUSE: NORMAL
LYMPHOCYTES # BLD AUTO: 1.7 10E3/UL (ref 0.8–5.3)
LYMPHOCYTES NFR BLD AUTO: 26 %
MCH RBC QN AUTO: 29.1 PG (ref 26.5–33)
MCHC RBC AUTO-ENTMCNC: 33.4 G/DL (ref 31.5–36.5)
MCV RBC AUTO: 87 FL (ref 78–100)
MONOCYTES # BLD AUTO: 0.6 10E3/UL (ref 0–1.3)
MONOCYTES NFR BLD AUTO: 10 %
NEUTROPHILS # BLD AUTO: 4 10E3/UL (ref 1.6–8.3)
NEUTROPHILS NFR BLD AUTO: 60 %
NRBC # BLD AUTO: 0 10E3/UL
NRBC BLD AUTO-RTO: 0 /100
NT-PROBNP SERPL-MCNC: 813 PG/ML (ref 0–1800)
P AXIS - MUSE: NORMAL DEGREES
PLATELET # BLD AUTO: 134 10E3/UL (ref 150–450)
POTASSIUM SERPL-SCNC: 3.6 MMOL/L (ref 3.4–5.3)
PR INTERVAL - MUSE: NORMAL MS
QRS DURATION - MUSE: 142 MS
QT - MUSE: 418 MS
QTC - MUSE: 502 MS
R AXIS - MUSE: -16 DEGREES
RBC # BLD AUTO: 4.19 10E6/UL (ref 3.8–5.2)
SODIUM SERPL-SCNC: 140 MMOL/L (ref 136–145)
SYSTOLIC BLOOD PRESSURE - MUSE: NORMAL MMHG
T AXIS - MUSE: -5 DEGREES
TROPONIN T SERPL HS-MCNC: 8 NG/L
TSH SERPL DL<=0.005 MIU/L-ACNC: 2.55 UIU/ML (ref 0.3–4.2)
VENTRICULAR RATE- MUSE: 87 BPM
WBC # BLD AUTO: 6.7 10E3/UL (ref 4–11)

## 2023-09-02 PROCEDURE — 93010 ELECTROCARDIOGRAM REPORT: CPT | Performed by: STUDENT IN AN ORGANIZED HEALTH CARE EDUCATION/TRAINING PROGRAM

## 2023-09-02 PROCEDURE — 250N000011 HC RX IP 250 OP 636: Performed by: STUDENT IN AN ORGANIZED HEALTH CARE EDUCATION/TRAINING PROGRAM

## 2023-09-02 PROCEDURE — 71275 CT ANGIOGRAPHY CHEST: CPT | Mod: 26 | Performed by: STUDENT IN AN ORGANIZED HEALTH CARE EDUCATION/TRAINING PROGRAM

## 2023-09-02 PROCEDURE — 99284 EMERGENCY DEPT VISIT MOD MDM: CPT | Mod: 25 | Performed by: STUDENT IN AN ORGANIZED HEALTH CARE EDUCATION/TRAINING PROGRAM

## 2023-09-02 PROCEDURE — 36415 COLL VENOUS BLD VENIPUNCTURE: CPT | Performed by: STUDENT IN AN ORGANIZED HEALTH CARE EDUCATION/TRAINING PROGRAM

## 2023-09-02 PROCEDURE — 85730 THROMBOPLASTIN TIME PARTIAL: CPT | Performed by: STUDENT IN AN ORGANIZED HEALTH CARE EDUCATION/TRAINING PROGRAM

## 2023-09-02 PROCEDURE — 85025 COMPLETE CBC W/AUTO DIFF WBC: CPT | Performed by: STUDENT IN AN ORGANIZED HEALTH CARE EDUCATION/TRAINING PROGRAM

## 2023-09-02 PROCEDURE — 99285 EMERGENCY DEPT VISIT HI MDM: CPT | Mod: 25 | Performed by: STUDENT IN AN ORGANIZED HEALTH CARE EDUCATION/TRAINING PROGRAM

## 2023-09-02 PROCEDURE — 83880 ASSAY OF NATRIURETIC PEPTIDE: CPT | Performed by: STUDENT IN AN ORGANIZED HEALTH CARE EDUCATION/TRAINING PROGRAM

## 2023-09-02 PROCEDURE — 93005 ELECTROCARDIOGRAM TRACING: CPT | Performed by: STUDENT IN AN ORGANIZED HEALTH CARE EDUCATION/TRAINING PROGRAM

## 2023-09-02 PROCEDURE — 85610 PROTHROMBIN TIME: CPT | Performed by: STUDENT IN AN ORGANIZED HEALTH CARE EDUCATION/TRAINING PROGRAM

## 2023-09-02 PROCEDURE — 85384 FIBRINOGEN ACTIVITY: CPT | Performed by: STUDENT IN AN ORGANIZED HEALTH CARE EDUCATION/TRAINING PROGRAM

## 2023-09-02 PROCEDURE — 96374 THER/PROPH/DIAG INJ IV PUSH: CPT | Mod: 59 | Performed by: STUDENT IN AN ORGANIZED HEALTH CARE EDUCATION/TRAINING PROGRAM

## 2023-09-02 PROCEDURE — 71275 CT ANGIOGRAPHY CHEST: CPT

## 2023-09-02 PROCEDURE — 84443 ASSAY THYROID STIM HORMONE: CPT | Performed by: STUDENT IN AN ORGANIZED HEALTH CARE EDUCATION/TRAINING PROGRAM

## 2023-09-02 PROCEDURE — 250N000011 HC RX IP 250 OP 636: Mod: JZ | Performed by: STUDENT IN AN ORGANIZED HEALTH CARE EDUCATION/TRAINING PROGRAM

## 2023-09-02 PROCEDURE — 84484 ASSAY OF TROPONIN QUANT: CPT | Performed by: STUDENT IN AN ORGANIZED HEALTH CARE EDUCATION/TRAINING PROGRAM

## 2023-09-02 PROCEDURE — 80048 BASIC METABOLIC PNL TOTAL CA: CPT | Performed by: STUDENT IN AN ORGANIZED HEALTH CARE EDUCATION/TRAINING PROGRAM

## 2023-09-02 RX ORDER — IOPAMIDOL 755 MG/ML
54 INJECTION, SOLUTION INTRAVASCULAR ONCE
Status: COMPLETED | OUTPATIENT
Start: 2023-09-02 | End: 2023-09-02

## 2023-09-02 RX ORDER — ONDANSETRON 2 MG/ML
4 INJECTION INTRAMUSCULAR; INTRAVENOUS ONCE
Status: COMPLETED | OUTPATIENT
Start: 2023-09-02 | End: 2023-09-02

## 2023-09-02 RX ADMIN — IOPAMIDOL 54 ML: 755 INJECTION, SOLUTION INTRAVENOUS at 18:18

## 2023-09-02 RX ADMIN — ONDANSETRON 4 MG: 2 INJECTION INTRAMUSCULAR; INTRAVENOUS at 18:41

## 2023-09-02 ASSESSMENT — ACTIVITIES OF DAILY LIVING (ADL)
ADLS_ACUITY_SCORE: 35
ADLS_ACUITY_SCORE: 35

## 2023-09-02 NOTE — ED PROVIDER NOTES
[place] EMERGENCY DEPARTMENT ([facility])    9/02/23         History     Chief Complaint   Patient presents with    Referral     HPI  Bobby Ignacio is a 78 year old female who with PMH of type 2 DM, hypothyroidism, degenerative joint disease, and hyperlipidemia presents to the ED for a referral.     Patient notes that she has been referred by her cardiologist as she was supposed to have a routine echocardiogram in the setting of her atrial fibrillation.  This was done on 8/31, and notes that she had significantly poor right ventricular dysfunction which is new compared to her previous echocardiogram.  Was told to come in for evaluation for pulmonary embolism.    From the standpoint of symptoms, patient denies any significant symptoms.  Has been having occasional left shoulder and hand pain but none in the last couple of days.  Denies any pain or shortness of breath at this time.  No fevers or chills.    Per Epic review:   Echocardiogram on 8/31/23:   Interpretation Summary  Left ventricular size, wall motion and function are normal. The ejection  fraction is 55-60%.  The right ventricle is normal size.Global right ventricular function is  moderately to severely reduced.  Pulmonary artery systolic pressure is normal.  No pericardial effusion is present.  The inferior vena cava is normal.  There is no prior study for direct comparison.     Past Medical History  Past Medical History:   Diagnosis Date    Chest pain 4/2014    Depression     Fatigue     Gastritis     Unspecified hypothyroidism     Hypothyroidism     Past Surgical History:   Procedure Laterality Date    CHOLECYSTECTOMY, OPEN       apixaban ANTICOAGULANT (ELIQUIS) 5 MG tablet  atorvastatin (LIPITOR) 20 MG tablet  bisacodyl (DULCOLAX) 5 MG EC tablet  bisacodyl (DULCOLAX) 5 MG EC tablet  bisacodyl (DULCOLAX) 5 MG EC tablet  Cholecalciferol (VITAMIN D) 2000 UNITS CAPS  gabapentin (NEURONTIN) 300 MG capsule  ketotifen (ZADITOR) 0.025 % ophthalmic  solution  levothyroxine (SYNTHROID/LEVOTHROID) 125 MCG tablet  metoprolol succinate ER (TOPROL-XL) 25 MG 24 hr tablet  olopatadine (PATADAY) 0.2 % ophthalmic solution  polyethylene glycol (GOLYTELY) 236 g suspension  polyethylene glycol (GOLYTELY) 236 g suspension  polyethylene glycol (GOLYTELY) 236 g suspension  pregabalin (LYRICA) 25 MG capsule  sertraline (ZOLOFT) 100 MG tablet  sucralfate (CARAFATE) 1 GM tablet  triamcinolone (KENALOG) 0.1 % external cream      Allergies   Allergen Reactions    Aspirin      GI UPSET  States she can tolerate single doses of enteric coated     Aspirin-Butalbital-Caffeine [Butalbital-Aspirin-Caffeine]      sensatitive stomach    Codeine      UPSET STOMACHE    Gabapentin Other (See Comments)     Depression     Family History  Family History   Problem Relation Age of Onset    Cardiovascular Father         CHF    C.A.D. Father     Cardiovascular Brother         CHF    C.A.D. Brother     Cardiovascular Brother     Cardiovascular Brother     Glaucoma No family hx of     Macular Degeneration No family hx of      Social History   Social History     Tobacco Use    Smoking status: Never    Smokeless tobacco: Never   Substance Use Topics    Alcohol use: No    Drug use: No         A medically appropriate review of systems was performed with pertinent positives and negatives noted in the HPI, and all other systems negative.    Physical Exam   BP: 134/72  Pulse: 88  Temp: 98  F (36.7  C)  Resp: 20  SpO2: 97 %  Physical Exam  GEN: Well appearing, non toxic, cooperative  HEENT: normocephalic and atraumatic, PERRLA, EOMI  CV: well-perfused, normal skin color for ethnicity, regular rate, slightly irregular rhythm  PULM: breathing comfortably, in no respiratory distress, clear to auscultation upper and lower lung fields  ABD: nondistended, soft, nontender  EXT: Full range of motion.  No edema.  NEURO: awake, conversant, grossly normal bilateral upper and lower extremity strength & ROM   SKIN: No  rashes, ecchymosis, or lacerations  PSYCH: Calm and cooperative, interactive      ED Course, Procedures, & Data      Procedures            EKG Interpretation:      Interpreted by Jovanna Dixon MD  Time reviewed: 1624  Symptoms at time of EKG: none   Rhythm: Atrial fibrillation  Rate: normal  Axis: normal  Ectopy: none  Conduction: Right bundle branch block  ST Segments/ T Waves: No ST-T wave changes  Q Waves: none  Comparison to prior: Unchanged    Clinical Impression: Rate controlled A-fib with right bundle branch block, baseline                 Results for orders placed or performed during the hospital encounter of 09/02/23   INR     Status: Abnormal   Result Value Ref Range    INR 1.22 (H) 0.85 - 1.15   Partial thromboplastin time     Status: Normal   Result Value Ref Range    aPTT 32 22 - 38 Seconds   Fibrinogen activity     Status: Normal   Result Value Ref Range    Fibrinogen Activity 326 170 - 490 mg/dL   Basic metabolic panel     Status: Abnormal   Result Value Ref Range    Sodium 140 136 - 145 mmol/L    Potassium 3.6 3.4 - 5.3 mmol/L    Chloride 106 98 - 107 mmol/L    Carbon Dioxide (CO2) 25 22 - 29 mmol/L    Anion Gap 9 7 - 15 mmol/L    Urea Nitrogen 23.0 8.0 - 23.0 mg/dL    Creatinine 0.68 0.51 - 0.95 mg/dL    Calcium 9.4 8.8 - 10.2 mg/dL    Glucose 114 (H) 70 - 99 mg/dL    GFR Estimate 89 >60 mL/min/1.73m2   Troponin T, High Sensitivity     Status: Normal   Result Value Ref Range    Troponin T, High Sensitivity 8 <=14 ng/L   Nt probnp inpatient (BNP)     Status: Normal   Result Value Ref Range    N terminal Pro BNP Inpatient 813 0 - 1,800 pg/mL   TSH with free T4 reflex     Status: Normal   Result Value Ref Range    TSH 2.55 0.30 - 4.20 uIU/mL   [place] Draw     Status: None    Narrative    The following orders were created for panel order [place] Draw.  Procedure                               Abnormality         Status                     ---------                               -----------         ------                      Extra Red Top Tube[[id number]]                               Final result                 Please view results for these tests on the individual orders.   CBC with platelets and differential     Status: Abnormal   Result Value Ref Range    WBC Count 6.7 4.0 - 11.0 10e3/uL    RBC Count 4.19 3.80 - 5.20 10e6/uL    Hemoglobin 12.2 11.7 - 15.7 g/dL    Hematocrit 36.5 35.0 - 47.0 %    MCV 87 78 - 100 fL    MCH 29.1 26.5 - 33.0 pg    MCHC 33.4 31.5 - 36.5 g/dL    RDW 13.2 10.0 - 15.0 %    Platelet Count 134 (L) 150 - 450 10e3/uL    % Neutrophils 60 %    % Lymphocytes 26 %    % Monocytes 10 %    % Eosinophils 3 %    % Basophils 1 %    % Immature Granulocytes 0 %    NRBCs per 100 WBC 0 <1 /100    Absolute Neutrophils 4.0 1.6 - 8.3 10e3/uL    Absolute Lymphocytes 1.7 0.8 - 5.3 10e3/uL    Absolute Monocytes 0.6 0.0 - 1.3 10e3/uL    Absolute Eosinophils 0.2 0.0 - 0.7 10e3/uL    Absolute Basophils 0.0 0.0 - 0.2 10e3/uL    Absolute Immature Granulocytes 0.0 <=0.4 10e3/uL    Absolute NRBCs 0.0 10e3/uL   Extra Red Top Tube     Status: None   Result Value Ref Range    Hold Specimen JI    EKG 12-lead, tracing only     Status: None   Result Value Ref Range    Systolic Blood Pressure  mmHg    Diastolic Blood Pressure  mmHg    Ventricular Rate 87 BPM    Atrial Rate 48 BPM    GA Interval  ms    QRS Duration 142 ms     ms    QTc 502 ms    P Axis  degrees    R AXIS -16 degrees    T Axis -5 degrees    Interpretation ECG       Undetermined rhythm  Right bundle branch block  Abnormal ECG  Unconfirmed report - interpretation of this ECG is computer generated - see medical record for final interpretation  Confirmed by - EMERGENCY ROOM, PHYSICIAN ([id number]),  TAE HUYNH ([id number]) on 9/2/2023 6:24:09 PM     CBC with platelets differential     Status: Abnormal    Narrative    The following orders were created for panel order CBC with platelets differential.  Procedure                               Abnormality          Status                     ---------                               -----------         ------                     CBC with platelets and d...[[id number]]  Abnormal            Final result                 Please view results for these tests on the individual orders.     Medications   iopamidol (ISOVUE-370) solution 54 mL (54 mLs Intravenous $Given 9/2/23 1818)   sodium chloride (PF) 0.9% PF flush 84 mL (84 mLs Intravenous $Given 9/2/23 1818)   ondansetron (ZOFRAN) injection 4 mg (4 mg Intravenous $Given 9/2/23 1841)     Labs Ordered and Resulted from Time of ED Arrival to Time of ED Departure   INR - Abnormal       Result Value    INR 1.22 (*)    BASIC METABOLIC PANEL - Abnormal    Sodium 140      Potassium 3.6      Chloride 106      Carbon Dioxide (CO2) 25      Anion Gap 9      Urea Nitrogen 23.0      Creatinine 0.68      Calcium 9.4      Glucose 114 (*)     GFR Estimate 89     CBC WITH PLATELETS AND DIFFERENTIAL - Abnormal    WBC Count 6.7      RBC Count 4.19      Hemoglobin 12.2      Hematocrit 36.5      MCV 87      MCH 29.1      MCHC 33.4      RDW 13.2      Platelet Count 134 (*)     % Neutrophils 60      % Lymphocytes 26      % Monocytes 10      % Eosinophils 3      % Basophils 1      % Immature Granulocytes 0      NRBCs per 100 WBC 0      Absolute Neutrophils 4.0      Absolute Lymphocytes 1.7      Absolute Monocytes 0.6      Absolute Eosinophils 0.2      Absolute Basophils 0.0      Absolute Immature Granulocytes 0.0      Absolute NRBCs 0.0     PARTIAL THROMBOPLASTIN TIME - Normal    aPTT 32     FIBRINOGEN ACTIVITY - Normal    Fibrinogen Activity 326     TROPONIN T, HIGH SENSITIVITY - Normal    Troponin T, High Sensitivity 8     NT PROBNP INPATIENT - Normal    N terminal Pro BNP Inpatient 813     TSH WITH FREE T4 REFLEX - Normal    TSH 2.55       CT Chest Pulmonary Embolism w Contrast    (Results Pending)          Critical care was not performed.     Medical Decision Making  The patient's presentation was of  moderate complexity (an undiagnosed new problem with uncertain diagnosis).    The patient's evaluation involved:  an assessment requiring an independent historian (see separate area of note for details)  review of external note(s) from 3+ sources (see separate area of note for details)  review of 3+ test result(s) ordered prior to this encounter (see separate area of note for details)  ordering and/or review of 3+ test(s) in this encounter (see separate area of note for details)    The patient's management necessitated high risk (a decision regarding hospitalization).    Assessment & Plan    78-year-old female with a past medical history of paroxysmal atrial fibrillation, hypothyroidism presents emergency department at the request of cardiology after she had a routine echocardiogram which demonstrated significant new right ventricular dysfunction with no symptoms at this time.    Otherwise hemodynamically stable.  Concern for possible underlying causes of right heart strain including PE, pulmonary hypertension, other causes of cardiomyopathy including ischemia, as well as nonischemic infiltrative process.    EKG without any focal findings concerning for acute infarct at this time.  Troponin within normal limits, patient not having any chest pain at this time although she has had occasional chest pain recently.  Will obtain CTPA to evaluate for underlying pulmonary embolism although lower suspicion based on her clinical picture.    Patient and her  provide the history and note that if she is otherwise not found to have anything emergent today they would request to follow-up with her cardiologist that she has an appointment with this coming week.      7:01 PM labs wnl. EKG without ischemia. No PE on CTA. Pt did vomit once after CT, but no anaphylaxis, no rash/dyspnea/etc. Doing well now. Will DC to home    I have reviewed the nursing notes. I have reviewed the findings, diagnosis, plan and need for follow up  with the patient.    New Prescriptions    No medications on file       Final diagnoses:   Chronic right-sided heart failure (H)       Jovanna Dixon MD  [facility] located within [facility] EMERGENCY DEPARTMENT  9/2/2023     Jovanna Dixon MD  09/02/23 2627

## 2023-09-02 NOTE — ED TRIAGE NOTES
Referred for PE r/o after recent echo  Denies SOB, CP  On eliquis   Triage Assessment       Row Name 09/02/23 9131       Triage Assessment (Adult)    Airway WDL WDL       Respiratory WDL    Respiratory WDL WDL       Skin Circulation/Temperature WDL    Skin Circulation/Temperature WDL WDL       Cardiac WDL    Cardiac WDL WDL       Peripheral/Neurovascular WDL    Peripheral Neurovascular WDL WDL       Cognitive/Neuro/Behavioral WDL    Cognitive/Neuro/Behavioral WDL WDL

## 2023-09-03 NOTE — DISCHARGE INSTRUCTIONS
You were evaluated due to problems seen on your outpatient echocardiogram including problems with the right side of your heart.  You had labs here, EKGs, and evaluation to evaluate for signs of a pulmonary embolism/blood clot in your lungs.  There is no sign of at this time.    We do recommend that you continue to have your follow-up appointment with cardiology this coming week.    Return to the emergency room with any severe difficulty breathing, chest pain, feeling like you are to pass out or any other concerning symptoms.

## 2023-09-03 NOTE — ED NOTES
Pt departed alert and responsive in a wheelchair accompanied by . Pt reviewed with all discharge instruction. Pt verbalized understanding. PIV removed on discharge. Pt advised to follow up with primary care team.

## 2023-09-06 NOTE — PROGRESS NOTES
I am delighted to see Bobbyezio Ignacio for follow up of atrial fibrillation and evaluation of RV dysfunction.      The patient is a 78 year old  female who is here with her . I met her 9/30/2021 when she was diagnosed with AF, incidentally discovered when she presented with non cardiac chest pain. Duration of AF was unknown, she was asymptomatic with ventricular rates 100-110 bpm. Apixaban started and Metoprolol was added. I offered a cardioversion - I had spoken with her son who is an ENT surgeon in [place] (at patient's request). She never scheduled the cardioversion and did not return for any follow up.    She was sent back from PCP office and saw EP JONO on 7/18/2023. Her daughter was with her at that time and reported noticing that patient was more limited in activities. The patient however did not report any symptoms. Her AF rate was 80 bpm during that visit. An echocardiogram was obtained with the plan that if EF was low, then a trial of cardioversion would be offered. It was also noted that patient stopped apixaban a few months prior due to concern for rash, but was asked to resume which she did and had no rash. Echo showed normal LV but RV function was severely reduced. She was sent for a chest PE CT which did not show PE.    Today, she is here with her . Both states that she feels fine. She has no palpitations, no shortness of breath. She feels her activity levels are the same. She is taking apixaban only once a day and wanted to know if that was ok.    Past Medical History:  Atrial fibrillation, incidentally found 9/2021; no antiarrhythmic drugs or cardioversions  Hypothyroidism  Chronic RBBB    Allergies:    Allergies   Allergen Reactions    Aspirin      GI UPSET  States she can tolerate single doses of enteric coated     Aspirin-Butalbital-Caffeine [Butalbital-Aspirin-Caffeine]      sensatitive stomach    Codeine      UPSET STOMACHE    Gabapentin Other (See Comments)     Depression        Medications:   Apixaban 5 mg - takes once a day  Levothyroxine 125 mcg every day  Metoprolol succinate 25 mg every day    Gabapentin  Sertraline  Vitamins      Physical examination  Vitals: 121/74, HR 96  BMI= 32 (66 kg)    Constitutional: In general, the patient is a pleasant female in no acute distress.    Targeted cardiovascular exam:  Irregularly irregular. Normal S1, S2. No murmur, rub, click, or gallop.   Extremities:Pulses are normal bilaterally throughout. No peripheral edema.  Respiratory: Clear to asculation.      I have personally and independently reviewed the following:  Labs:  2023: chol 122, HDL 45, LDL 59, TG 90  2023: cr 0.68, hgb 12, plt 134K, TSH 2.55    Echo:  2023:LVEF 55-60%, normal RV size with moderate to severely reduced RV function, normal IVC, moderate TR, RVSP 22.8, RA 3, KAUSHIK 54  2015: LVEF 55-60%, normal RV, RVSP ~ 22    Chest CT PE protocol 2023: neg for PE; bilateral pulmonary nodules; RV/LV ratio 0.96    Nuclear lexiscan stress test 2015: negative for ischemia    EK2023: AF 87 bpm, RBBB; no change from prior  10/16/2017: sinus 87 RBBB      Assessment :  Atrial fibrillation, permanent, discovered 2021 but duration unknown. Last known sinus EKG was . [id number] score is 3 for age and female, she should be on apixaban 5 bid - I advised her that taking less than therapeutic doses of apixaban is actually more dangerous because it does not offer stroke protection but still increases bleeding risk. Her rate is controlled and LVEF is normal. With her enlarged left atrium, maintenance of sinus rhythm would be challenging. She appears asymptomatic, and thus, rate control and anticoagulation is a reasonable strategy going forward.  Isolated RV dysfunction, new problem. No LV dysfunction or LVH or pulmonary hypertension. No clinical signs of right side heart failure. Unclear cause. Her prior echo from  showed normal RV function. No pulmonary  embolism. I recommend evaluation in PH/RV dysfunction clinic.    Plan:  Continue apixaban 5 bid and metoprolol succinate 25 every day.   No indication for cardioversion.  Refer for RV dysfunction evaluation.      I spent a total of 20 minutes face to face with  Bobby Ignacio during today's office visit. I have spent an additional 20 minutes today on chart review and documentation.    The patient is to return  as needed. The patient understood the treatment plan as outlined above.  There were no barriers to learning.      Sherie Barrera MD

## 2023-09-07 ENCOUNTER — OFFICE VISIT (OUTPATIENT)
Dept: CARDIOLOGY | Facility: CLINIC | Age: 78
End: 2023-09-07
Attending: INTERNAL MEDICINE
Payer: COMMERCIAL

## 2023-09-07 VITALS
WEIGHT: 145.6 LBS | BODY MASS INDEX: 31.51 KG/M2 | DIASTOLIC BLOOD PRESSURE: 74 MMHG | SYSTOLIC BLOOD PRESSURE: 121 MMHG | OXYGEN SATURATION: 94 % | HEART RATE: 96 BPM

## 2023-09-07 DIAGNOSIS — I51.9 RIGHT VENTRICULAR DYSFUNCTION: Primary | ICD-10-CM

## 2023-09-07 DIAGNOSIS — I48.21 PERMANENT ATRIAL FIBRILLATION (H): ICD-10-CM

## 2023-09-07 PROCEDURE — 93005 ELECTROCARDIOGRAM TRACING: CPT

## 2023-09-07 PROCEDURE — G0463 HOSPITAL OUTPT CLINIC VISIT: HCPCS | Performed by: INTERNAL MEDICINE

## 2023-09-07 PROCEDURE — 99215 OFFICE O/P EST HI 40 MIN: CPT | Performed by: INTERNAL MEDICINE

## 2023-09-07 PROCEDURE — 93010 ELECTROCARDIOGRAM REPORT: CPT | Performed by: INTERNAL MEDICINE

## 2023-09-07 ASSESSMENT — PAIN SCALES - GENERAL: PAINLEVEL: NO PAIN (0)

## 2023-09-07 NOTE — NURSING NOTE
Chief Complaint   Patient presents with    Follow Up     2 mo follow up for PAF to discuss rhythm vs rate control     Vitals were taken, medications reconciled, and EKG was performed.    Isiah Cabezas, EMT  4:34 PM

## 2023-09-07 NOTE — LETTER
9/7/2023      RE: Bobby Ignacio  [address] [id number]       Dear Colleague,    Thank you for the opportunity to participate in the care of your patient, Bobby Ignacio, at the [facility] [place] at [facility]. Please see a copy of my visit note below.    I am delighted to see Bobby Ignacio for follow up of atrial fibrillation and evaluation of RV dysfunction.      The patient is a 78 year old  female who is here with her . I met her 9/30/2021 when she was diagnosed with AF, incidentally discovered when she presented with non cardiac chest pain. Duration of AF was unknown, she was asymptomatic with ventricular rates 100-110 bpm. Apixaban started and Metoprolol was added. I offered a cardioversion - I had spoken with her son who is an ENT surgeon in [place] (at patient's request). She never scheduled the cardioversion and did not return for any follow up.    She was sent back from PCP office and saw EP JONO on 7/18/2023. Her daughter was with her at that time and reported noticing that patient was more limited in activities. The patient however did not report any symptoms. Her AF rate was 80 bpm during that visit. An echocardiogram was obtained with the plan that if EF was low, then a trial of cardioversion would be offered. It was also noted that patient stopped apixaban a few months prior due to concern for rash, but was asked to resume which she did and had no rash. Echo showed normal LV but RV function was severely reduced. She was sent for a chest PE CT which did not show PE.    Today, she is here with her . Both states that she feels fine. She has no palpitations, no shortness of breath. She feels her activity levels are the same. She is taking apixaban only once a day and wanted to know if that was ok.    Past Medical History:  Atrial fibrillation, incidentally found 9/2021; no antiarrhythmic drugs  or cardioversions  Hypothyroidism  Chronic RBBB    Allergies:    Allergies   Allergen Reactions     Aspirin      GI UPSET  States she can tolerate single doses of enteric coated      Aspirin-Butalbital-Caffeine [Butalbital-Aspirin-Caffeine]      sensatitive stomach     Codeine      UPSET STOMACHE     Gabapentin Other (See Comments)     Depression       Medications:   Apixaban 5 mg - takes once a day  Levothyroxine 125 mcg every day  Metoprolol succinate 25 mg every day    Gabapentin  Sertraline  Vitamins      Physical examination  Vitals: 121/74, HR 96  BMI= 32 (66 kg)    Constitutional: In general, the patient is a pleasant female in no acute distress.    Targeted cardiovascular exam:  Irregularly irregular. Normal S1, S2. No murmur, rub, click, or gallop.   Extremities:Pulses are normal bilaterally throughout. No peripheral edema.  Respiratory: Clear to asculation.      I have personally and independently reviewed the following:  Labs:  2023: chol 122, HDL 45, LDL 59, TG 90  2023: cr 0.68, hgb 12, plt 134K, TSH 2.55    Echo:  2023:LVEF 55-60%, normal RV size with moderate to severely reduced RV function, normal IVC, moderate TR, RVSP 22.8, RA 3, KAUSHIK 54  2015: LVEF 55-60%, normal RV, RVSP ~ 22    Chest CT PE protocol 2023: neg for PE; bilateral pulmonary nodules; RV/LV ratio 0.96    Nuclear lexiscan stress test 2015: negative for ischemia    EK2023: AF 87 bpm, RBBB; no change from prior  10/16/2017: sinus 87 RBBB      Assessment :  Atrial fibrillation, permanent, discovered 2021 but duration unknown. Last known sinus EKG was . [id number] score is 3 for age and female, she should be on apixaban 5 bid - I advised her that taking less than therapeutic doses of apixaban is actually more dangerous because it does not offer stroke protection but still increases bleeding risk. Her rate is controlled and LVEF is normal. With her enlarged left atrium, maintenance of sinus rhythm  would be challenging. She appears asymptomatic, and thus, rate control and anticoagulation is a reasonable strategy going forward.  Isolated RV dysfunction, new problem. No LV dysfunction or LVH or pulmonary hypertension. No clinical signs of right side heart failure. Unclear cause. Her prior echo from 2015 showed normal RV function. No pulmonary embolism. I recommend evaluation in PH/RV dysfunction clinic.    Plan:  Continue apixaban 5 bid and metoprolol succinate 25 every day.   No indication for cardioversion.  Refer for RV dysfunction evaluation.      I spent a total of 20 minutes face to face with  Bobby Ignacio during today's office visit. I have spent an additional 20 minutes today on chart review and documentation.    The patient is to return  as needed. The patient understood the treatment plan as outlined above.  There were no barriers to learning.      Sherie Barrera MD          Please do not hesitate to contact me if you have any questions/concerns.     Sincerely,     Sherie Barrera MD

## 2023-09-09 LAB
ATRIAL RATE - MUSE: 75 BPM
DIASTOLIC BLOOD PRESSURE - MUSE: NORMAL MMHG
INTERPRETATION ECG - MUSE: NORMAL
P AXIS - MUSE: NORMAL DEGREES
PR INTERVAL - MUSE: NORMAL MS
QRS DURATION - MUSE: 140 MS
QT - MUSE: 404 MS
QTC - MUSE: 457 MS
R AXIS - MUSE: -6 DEGREES
SYSTOLIC BLOOD PRESSURE - MUSE: NORMAL MMHG
T AXIS - MUSE: -15 DEGREES
VENTRICULAR RATE- MUSE: 77 BPM

## 2023-09-18 ENCOUNTER — TELEPHONE (OUTPATIENT)
Dept: INTERNAL MEDICINE | Facility: CLINIC | Age: 78
End: 2023-09-18

## 2023-09-18 NOTE — TELEPHONE ENCOUNTER
[facility] Call Center    Phone Message    May a detailed message be left on voicemail: yes     Reason for Call: Other: Patient spouse Daren would like to schedule New PH appt. Please reach out to spouse for scheduling. Thank you     Action Taken: Other: Cardiology     Travel Screening: Not Applicable    Thank you!  [facility]

## 2023-09-22 ENCOUNTER — PRE VISIT (OUTPATIENT)
Dept: CARDIOLOGY | Facility: CLINIC | Age: 78
End: 2023-09-22
Payer: COMMERCIAL

## 2023-09-22 NOTE — TELEPHONE ENCOUNTER
RECORDS RECEIVED FROM:    DATE RECEIVED:    GENERAL RECORDS STATUS DETAILS   OFFICE NOTE from cardiologists Internal 9-7-23 [place]   EKG (STRIPS & REPORTS) Internal 9-7-23   ECHOS (IMAGES AND REPORTS) Internal 8-31-23   PULMONARY HYPERTENSION      6 MINUTE WALK TEST N/A    PULMONARY FUNCTION TESTS N/A    RIGHT HEART CATH (IMAGES) N/A    SLEEP STUDY / OVERNIGHT OXIMETRY N/A    XR CHEST   (IMAGES AND REPORTS) N/A    CHEST CT  (IMAGES AND REPORTS) Internal 9-2-23   V/Q SCAN (IMAGES) N/A    LIVER US  (IMAGES AND REPORTS) N/A    ANGIOGRAMS (IMAGES) N/A    STRESS TEST   (IMAGES AND REPORTS) N/A

## 2023-09-25 DIAGNOSIS — R06.09 DOE (DYSPNEA ON EXERTION): ICD-10-CM

## 2023-09-25 DIAGNOSIS — I27.20 PULMONARY HTN (H): Primary | ICD-10-CM

## 2023-09-26 ENCOUNTER — HOSPITAL ENCOUNTER (EMERGENCY)
Facility: CLINIC | Age: 78
Discharge: HOME | End: 2023-09-26
Attending: EMERGENCY MEDICINE | Admitting: EMERGENCY MEDICINE
Payer: COMMERCIAL

## 2023-09-26 ENCOUNTER — APPOINTMENT (OUTPATIENT)
Dept: GENERAL RADIOLOGY | Facility: CLINIC | Age: 78
End: 2023-09-26
Payer: COMMERCIAL

## 2023-09-26 VITALS
RESPIRATION RATE: 18 BRPM | OXYGEN SATURATION: 92 % | TEMPERATURE: 98.3 F | HEART RATE: 87 BPM | DIASTOLIC BLOOD PRESSURE: 79 MMHG | SYSTOLIC BLOOD PRESSURE: 124 MMHG

## 2023-09-26 DIAGNOSIS — M25.512 CHRONIC LEFT SHOULDER PAIN: ICD-10-CM

## 2023-09-26 DIAGNOSIS — Z79.01 LONG TERM (CURRENT) USE OF ANTICOAGULANTS: ICD-10-CM

## 2023-09-26 DIAGNOSIS — I48.91 ATRIAL FIBRILLATION (H): Primary | ICD-10-CM

## 2023-09-26 DIAGNOSIS — M19.012 DEGENERATIVE ARTHRITIS OF LEFT SHOULDER REGION: ICD-10-CM

## 2023-09-26 DIAGNOSIS — G89.29 CHRONIC LEFT SHOULDER PAIN: ICD-10-CM

## 2023-09-26 LAB
ALBUMIN SERPL BCG-MCNC: 4.6 G/DL (ref 3.5–5.2)
ALP SERPL-CCNC: 112 U/L (ref 35–104)
ALT SERPL W P-5'-P-CCNC: 13 U/L (ref 0–50)
ANION GAP SERPL CALCULATED.3IONS-SCNC: 13 MMOL/L (ref 7–15)
AST SERPL W P-5'-P-CCNC: 23 U/L (ref 0–45)
ATRIAL RATE - MUSE: 85 BPM
BILIRUB SERPL-MCNC: 0.7 MG/DL
BUN SERPL-MCNC: 17.9 MG/DL (ref 8–23)
CA-I BLD-MCNC: 5 MG/DL (ref 4.4–5.2)
CALCIUM SERPL-MCNC: 10 MG/DL (ref 8.8–10.2)
CALCIUM SERPL-MCNC: 10 MG/DL (ref 8.8–10.2)
CHLORIDE SERPL-SCNC: 102 MMOL/L (ref 98–107)
CREAT SERPL-MCNC: 0.7 MG/DL (ref 0.51–0.95)
DEPRECATED HCO3 PLAS-SCNC: 23 MMOL/L (ref 22–29)
DIASTOLIC BLOOD PRESSURE - MUSE: NORMAL MMHG
EGFRCR SERPLBLD CKD-EPI 2021: 88 ML/MIN/1.73M2
ERYTHROCYTE [DISTWIDTH] IN BLOOD BY AUTOMATED COUNT: 13 % (ref 10–15)
GLUCOSE SERPL-MCNC: 99 MG/DL (ref 70–99)
HCT VFR BLD AUTO: 43.1 % (ref 35–47)
HGB BLD-MCNC: 14.3 G/DL (ref 11.7–15.7)
INTERPRETATION ECG - MUSE: NORMAL
MCH RBC QN AUTO: 29.4 PG (ref 26.5–33)
MCHC RBC AUTO-ENTMCNC: 33.2 G/DL (ref 31.5–36.5)
MCV RBC AUTO: 89 FL (ref 78–100)
P AXIS - MUSE: NORMAL DEGREES
PLATELET # BLD AUTO: 171 10E3/UL (ref 150–450)
POTASSIUM SERPL-SCNC: 4.1 MMOL/L (ref 3.4–5.3)
PR INTERVAL - MUSE: NORMAL MS
PROT SERPL-MCNC: 8 G/DL (ref 6.4–8.3)
QRS DURATION - MUSE: 134 MS
QT - MUSE: 372 MS
QTC - MUSE: 442 MS
R AXIS - MUSE: -12 DEGREES
RBC # BLD AUTO: 4.86 10E6/UL (ref 3.8–5.2)
SODIUM SERPL-SCNC: 138 MMOL/L (ref 135–145)
SYSTOLIC BLOOD PRESSURE - MUSE: NORMAL MMHG
T AXIS - MUSE: 107 DEGREES
TROPONIN T SERPL HS-MCNC: 6 NG/L
VENTRICULAR RATE- MUSE: 85 BPM
WBC # BLD AUTO: 9.1 10E3/UL (ref 4–11)

## 2023-09-26 PROCEDURE — 93010 ELECTROCARDIOGRAM REPORT: CPT | Performed by: EMERGENCY MEDICINE

## 2023-09-26 PROCEDURE — 36415 COLL VENOUS BLD VENIPUNCTURE: CPT

## 2023-09-26 PROCEDURE — 85027 COMPLETE CBC AUTOMATED: CPT

## 2023-09-26 PROCEDURE — 73030 X-RAY EXAM OF SHOULDER: CPT | Mod: 26 | Performed by: RADIOLOGY

## 2023-09-26 PROCEDURE — 99284 EMERGENCY DEPT VISIT MOD MDM: CPT | Mod: 25 | Performed by: EMERGENCY MEDICINE

## 2023-09-26 PROCEDURE — 93005 ELECTROCARDIOGRAM TRACING: CPT

## 2023-09-26 PROCEDURE — 82330 ASSAY OF CALCIUM: CPT

## 2023-09-26 PROCEDURE — 73030 X-RAY EXAM OF SHOULDER: CPT | Mod: LT

## 2023-09-26 PROCEDURE — 80053 COMPREHEN METABOLIC PANEL: CPT

## 2023-09-26 PROCEDURE — 250N000013 HC RX MED GY IP 250 OP 250 PS 637

## 2023-09-26 PROCEDURE — 99285 EMERGENCY DEPT VISIT HI MDM: CPT

## 2023-09-26 PROCEDURE — 84484 ASSAY OF TROPONIN QUANT: CPT

## 2023-09-26 RX ORDER — ACETAMINOPHEN 500 MG
500 TABLET ORAL 2 TIMES DAILY PRN
Qty: 30 TABLET | Refills: 0 | Status: SHIPPED | OUTPATIENT
Start: 2023-09-26

## 2023-09-26 RX ORDER — ACETAMINOPHEN 325 MG/1
650 TABLET ORAL ONCE
Status: COMPLETED | OUTPATIENT
Start: 2023-09-26 | End: 2023-09-26

## 2023-09-26 RX ORDER — GABAPENTIN 300 MG/1
300 CAPSULE ORAL AT BEDTIME
Qty: 30 CAPSULE | Refills: 1 | Status: SHIPPED | OUTPATIENT
Start: 2023-09-26 | End: 2024-07-31

## 2023-09-26 RX ADMIN — ACETAMINOPHEN 650 MG: 325 TABLET, FILM COATED ORAL at 18:16

## 2023-09-26 ASSESSMENT — ACTIVITIES OF DAILY LIVING (ADL)
ADLS_ACUITY_SCORE: 35
ADLS_ACUITY_SCORE: 35

## 2023-09-26 NOTE — ED PROVIDER NOTES
ED Provider Note  [facility]      History     Chief Complaint   Patient presents with    Shoulder Pain     HPI  Bobby Ignacio is a 78 year old female with significant medical history hypothyroidism on levothyroxine, A-fib on Eliquis, anxiety and depression, osteopenia, chronic left shoulder pain, type 2 diabetes mellitus, degenerative joint disease presented to ED with left shoulder pain and bilateral lower extremity cramps.  Patient endorses that pain started yesterday night on her left forearm which is crampy in nature, radiating to left shoulder and then to left neck. She states that her pain exaggerates during nights when she lies down but gets better when she sits back up.  She endorses that her left shoulder pain is not associated with numbness or tingling or weakness.  No complaints of shortness of breath, chest pain, palpitations.  No complaints of dizziness. Patient also complained of chronic muscle cramps in both lower extremities especially during nights. No tingling or numbness or weakness associated with cramps.  No history of cancer.                 Physical Exam   BP: 121/79  Pulse: 96  Temp: 98.3  F (36.8  C)  Resp: 18  SpO2: 92 %  Physical Exam  Constitutional:       Appearance: Normal appearance.   HENT:      Head: Normocephalic and atraumatic.      Nose: Nose normal.   Eyes:      Extraocular Movements: Extraocular movements intact.      Pupils: Pupils are equal, round, and reactive to light.   Cardiovascular:      Rate and Rhythm: Normal rate. Rhythm irregular.      Pulses: Normal pulses.   Pulmonary:      Effort: Pulmonary effort is normal.      Breath sounds: Normal breath sounds.   Abdominal:      General: Abdomen is flat.      Palpations: Abdomen is soft.   Musculoskeletal:      Cervical back: Normal range of motion. Tenderness present.   Neurological:      General: No focal deficit present.      Mental Status: She is alert and oriented to person, place, and time.    Psychiatric:         Mood and Affect: Mood normal.          Results for orders placed or performed during the hospital encounter of 09/26/23   XR Shoulder Left G/E 3 Views     Status: None    Narrative    EXAM: XR SHOULDER LEFT G/E 3 VIEWS  LOCATION: [facility]  DATE: 9/26/2023    INDICATION: Left shoulder pain.  COMPARISON: None.      Impression    IMPRESSION:   1.  No fracture or joint malalignment.  2.  Moderate/advanced left glenohumeral degenerative arthrosis.  3.  Mild acromioclavicular arthrosis.  4.  Hypertrophic changes in the thoracic spine.    CBC with platelets     Status: Normal   Result Value Ref Range    WBC Count 9.1 4.0 - 11.0 10e3/uL    RBC Count 4.86 3.80 - 5.20 10e6/uL    Hemoglobin 14.3 11.7 - 15.7 g/dL    Hematocrit 43.1 35.0 - 47.0 %    MCV 89 78 - 100 fL    MCH 29.4 26.5 - 33.0 pg    MCHC 33.2 31.5 - 36.5 g/dL    RDW 13.0 10.0 - 15.0 %    Platelet Count 171 150 - 450 10e3/uL   Comprehensive metabolic panel     Status: Abnormal   Result Value Ref Range    Sodium 138 135 - 145 mmol/L    Potassium 4.1 3.4 - 5.3 mmol/L    Carbon Dioxide (CO2) 23 22 - 29 mmol/L    Anion Gap 13 7 - 15 mmol/L    Urea Nitrogen 17.9 8.0 - 23.0 mg/dL    Creatinine 0.70 0.51 - 0.95 mg/dL    GFR Estimate 88 >60 mL/min/1.73m2    Calcium 10.0 8.8 - 10.2 mg/dL    Chloride 102 98 - 107 mmol/L    Glucose 99 70 - 99 mg/dL    Alkaline Phosphatase 112 (H) 35 - 104 U/L    AST 23 0 - 45 U/L    ALT 13 0 - 50 U/L    Protein Total 8.0 6.4 - 8.3 g/dL    Albumin 4.6 3.5 - 5.2 g/dL    Bilirubin Total 0.7 <=1.2 mg/dL   Troponin T, High Sensitivity     Status: Normal   Result Value Ref Range    Troponin T, High Sensitivity 6 <=14 ng/L   Calcium     Status: Normal   Result Value Ref Range    Calcium 10.0 8.8 - 10.2 mg/dL   Ionized Calcium     Status: Normal   Result Value Ref Range    Calcium Ionized Whole Blood 5.0 4.4 - 5.2 mg/dL   EKG 12 lead     Status: None   Result Value Ref Range     Systolic Blood Pressure  mmHg    Diastolic Blood Pressure  mmHg    Ventricular Rate 85 BPM    Atrial Rate 85 BPM    NV Interval  ms    QRS Duration 134 ms     ms    QTc 442 ms    P Axis  degrees    R AXIS -12 degrees    T Axis 107 degrees    Interpretation ECG       Atrial fibrillation  Right bundle branch block  Abnormal ECG  Unconfirmed report - interpretation of this ECG is computer generated - see medical record for final interpretation  Confirmed by - EMERGENCY ROOM, PHYSICIAN ([id number]),  TAE HUYNH ([id number]) on 9/26/2023 6:08:16 PM       Medications   acetaminophen (TYLENOL) tablet 650 mg (650 mg Oral $Given 9/26/23 1816)     Labs Ordered and Resulted from Time of ED Arrival to Time of ED Departure   COMPREHENSIVE METABOLIC PANEL - Abnormal       Result Value    Sodium 138      Potassium 4.1      Carbon Dioxide (CO2) 23      Anion Gap 13      Urea Nitrogen 17.9      Creatinine 0.70      GFR Estimate 88      Calcium 10.0      Chloride 102      Glucose 99      Alkaline Phosphatase 112 (*)     AST 23      ALT 13      Protein Total 8.0      Albumin 4.6      Bilirubin Total 0.7     CBC WITH PLATELETS - Normal    WBC Count 9.1      RBC Count 4.86      Hemoglobin 14.3      Hematocrit 43.1      MCV 89      MCH 29.4      MCHC 33.2      RDW 13.0      Platelet Count 171     TROPONIN T, HIGH SENSITIVITY - Normal    Troponin T, High Sensitivity 6     CALCIUM - Normal    Calcium 10.0     IONIZED CALCIUM - Normal    Calcium Ionized Whole Blood 5.0       XR Shoulder Left G/E 3 Views   Final Result   IMPRESSION:    1.  No fracture or joint malalignment.   2.  Moderate/advanced left glenohumeral degenerative arthrosis.   3.  Mild acromioclavicular arthrosis.   4.  Hypertrophic changes in the thoracic spine.                Assessment & Plan    Bobby Ignacio is a 78 year old female with significant medical history hypothyroidism on levothyroxine, A-fib on Eliquis, anxiety and depression, osteopenia, chronic left  shoulder pain, type 2 diabetes mellitus, peripheral neuropathy, degenerative joint disease presented to ED with left shoulder pain radiating to left neck likely secondary to degenerative changes in the left shoulder joint. Patient was prescribed Gabapentin 300 mg, tylenol before which she hasn't been taking. In the ED, she was treated with tylenol 650 mg for her pain.  Considered myocardial ischemia.  Ordered EKG which is not notable for any acute ischemic changes.  However, notable for A fib and chronic R BBB. Low concern for PE as she is very compliant with Eliquis and no complaints of chest pain/shortness of breath/ palpitations.  x-ray of left shoulder notable for moderate/advanced left glenohumeral degenerative arthrosis, mild acromioclavicular arthrosis. Her pain is likely secondary to degenerative changes of her left shoulder joint and non-compliance of her pain meds for chronic left shoulder pain.  Her bilateral lower leg cramps could possibly be due to restless leg syndrome.  Hence, sending her home with gabapentin 300 mg nightly and Tylenol 650 mg as needed and encouraged complains of pain meds and importance of physical therapy during discharge.    I have reviewed the nursing notes. I have reviewed the findings, diagnosis, plan and need for follow up with the patient.    Discharge Medication List as of 9/26/2023  7:10 PM        START taking these medications    Details   acetaminophen (TYLENOL) 500 MG tablet Take 1 tablet (500 mg) by mouth 2 times daily as needed for mild pain or pain, Disp-30 tablet, R-0, Local Print             Final diagnoses:   Chronic left shoulder pain   Degenerative arthritis of left shoulder region     Mt Rice MD  PGY-1, Internal Medicine  [facility] EMERGENCY DEPARTMENT  9/26/2023      --    ED Attending Physician Attestation    I Shea Middleton MD, cared for this patient with the Medical Student. I performed, or re-performed, the physical exam and  medical decision-making. I have verified the accuracy of all the medical student findings and documentation above, and have edited as necessary.      Medical Decision Making  The patient's presentation was of moderate complexity (a chronic illness mild to moderate exacerbation, progression, or side effect of treatment).    The patient's evaluation involved:  review of external note(s) from 2 sources (see separate area of note for details)  review of 3+ test result(s) ordered prior to this encounter (see separate area of note for details)  ordering and/or review of 3+ test(s) in this encounter (see separate area of note for details)  independent interpretation of testing performed by another health professional (see separate area of note for details)    The patient's management necessitated moderate risk (prescription drug management including medications given in the ED).          Shea Middleton MD  Emergency Medicine        Shea Middleton MD  09/26/23 2017

## 2023-09-26 NOTE — ED TRIAGE NOTES
Patient presents to triage for evaluation of pain in shoulder. Patient states she has been having pain in L shoulder x1 week. Pain radiates across to other shoulder and down her L arm. Patient also reports cramps in bilat LE. Patient denies chest pain/SOB. Patient takes eliquis for a fib and has been taking as prescribed.      Triage Assessment       Row Name 09/26/23 5853       Triage Assessment (Adult)    Airway WDL WDL       Respiratory WDL    Respiratory WDL WDL       Skin Circulation/Temperature WDL    Skin Circulation/Temperature WDL WDL       Cardiac WDL    Cardiac WDL WDL       Peripheral/Neurovascular WDL    Peripheral Neurovascular WDL WDL       Cognitive/Neuro/Behavioral WDL    Cognitive/Neuro/Behavioral WDL WDL

## 2023-10-02 ENCOUNTER — TELEPHONE (OUTPATIENT)
Dept: INTERNAL MEDICINE | Facility: CLINIC | Age: 78
End: 2023-10-02

## 2023-10-02 NOTE — TELEPHONE ENCOUNTER
----- Message from Ernestina Ott sent at 9/29/2023 10:05 AM CDT -----  She was scheduled without us knowing but is there anyway we can add labs, 6 min walk , PFT's ? Either prior or same day as what she has.       These are basic testing for us to see the big picture on why she got referred to us. No need for NM lung scan since she is allergic at this moment. Here is a description on what a walk/ PFT does and helps us with diagnosing.           6 min walk test (6MWT)- is an assessment to measure the distance a person is capable of walking on a flat, hard surface in 6 minutes. It is a useful test to measure the response of all the bodily systems that a person uses while exercising. These include the respiratory and cardiovascular systems, blood circulation, and muscle movement.       PFT test- Pulmonary function tests (PFTs) are noninvasive tests that show how well the lungs are working. The tests measure lung volume, capacity, rates of flow, and gas exchange.

## 2023-10-27 ENCOUNTER — PRE VISIT (OUTPATIENT)
Dept: CARDIOLOGY | Facility: CLINIC | Age: 78
End: 2023-10-27
Payer: COMMERCIAL

## 2023-10-27 NOTE — TELEPHONE ENCOUNTER
Patient Name: Bobby Ignacio Age: 78 year old, female, 1945 MRN: [id number]   Referring Provider:  Dr. Barrera Appt:  10/30/23       PH Medications:  none      Patient History: Pt has a history of PAF, hypothyroidism, degenerative joint disease, HLD      Recent Testing:       Date Test Epic Media/Scan Care Everywhere     Angio/Stress []  []   []     8/31/23 Echo             Tricuspid Valve  The tricuspid valve is normal. Moderate tricuspid insufficiency is present.  The right ventricular systolic pressure is approximated at 22.8 mmHg plus the  right atrial pressure. Pulmonary artery systolic pressure is normal.     Pulmonic Valve  The valve leaflets are not well visualized. Mild pulmonic insufficiency is  present.                       [x]  []   []     9/26/23 EKG        a.fib 85 [x]   []   []     9/2/23 Chest CTPE    IMPRESSION:   1. Exam is negative for acute pulmonary embolism.  2. Bilateral sub-6 mm pulmonary nodules. According to Fleischner  criteria, if patient is low risk for lung cancer, no follow-up  required. If high risk, consider optional CT at 12 months.  3. Mildly enlarged precarinal lymph node, may be reactive. Recommend  attention on follow-up.      [x]  []   []

## 2023-10-30 ENCOUNTER — TELEPHONE (OUTPATIENT)
Dept: INTERNAL MEDICINE | Facility: CLINIC | Age: 78
End: 2023-10-30

## 2023-10-30 NOTE — TELEPHONE ENCOUNTER
NEW PH VIKTORIYA    No showed, will have to call and reschedule     Ernestina Ott  Clinic    Pulmonary Hypertension   [facility]  (p) [phone]

## 2023-11-05 ENCOUNTER — HEALTH MAINTENANCE LETTER (OUTPATIENT)
Age: 78
End: 2023-11-05

## 2023-11-10 NOTE — TELEPHONE ENCOUNTER
Talked with the  and he stated that she is out of town for a month and try again in a month.       Ernestina Ott  Clinic    Pulmonary Hypertension   [facility]  (P) [phone]

## 2023-12-08 ENCOUNTER — TELEPHONE (OUTPATIENT)
Dept: CARDIOLOGY | Facility: CLINIC | Age: 78
End: 2023-12-08
Payer: COMMERCIAL

## 2023-12-08 NOTE — TELEPHONE ENCOUNTER
Called patient to schedule with Dr. Blue with labs prior. LVM and direct call back number for clinic    Aaliyah Al RN on 12/8/2023 at 12:08 PM

## 2024-01-04 NOTE — TELEPHONE ENCOUNTER
Called and LVM / Sent letter to patient. Blake attempts. Closing the loop.  Patient has had over 3 messages , canceled and no showed to her appointments.      Ernestina Ott  Clinic    Pulmonary Hypertension   [facility]  (p) [phone]

## 2024-02-26 ENCOUNTER — TELEPHONE (OUTPATIENT)
Dept: OPHTHALMOLOGY | Facility: CLINIC | Age: 79
End: 2024-02-26
Payer: COMMERCIAL

## 2024-02-26 NOTE — TELEPHONE ENCOUNTER
[facility] Call Center    Phone Message    May a detailed message be left on voicemail: yes     Reason for Call: Symptoms or Concerns     If patient has red-flag symptoms, warm transfer to triage line    Current symptom or concern: Ongoing irritation, itchy and redness in both eyes    Symptoms have been present for:  3-4 week(s)    Has patient previously been seen for this? No    By : n/a    Date: n/a    Are there any new or worsening symptoms? Yes: Increase in symptoms    Action Taken: Message routed to:  [facility] eye    Travel Screening: Not Applicable

## 2024-03-24 ENCOUNTER — HEALTH MAINTENANCE LETTER (OUTPATIENT)
Age: 79
End: 2024-03-24

## 2024-06-27 ENCOUNTER — TELEPHONE (OUTPATIENT)
Dept: CARDIOLOGY | Facility: CLINIC | Age: 79
End: 2024-06-27
Payer: COMMERCIAL

## 2024-06-27 NOTE — TELEPHONE ENCOUNTER
6/27 Left Voicemail (1st Attempt) and Sent Mychart (1st Attempt) for the patient to call back and schedule the following:    Appointment type: Return EP  Provider: Brayden Cage (7/17) or Cody (7/16)  Return date: 7/17/2024 or 7/16/2024 schedule manually   Specialty phone number: [phone] opt 1  Additional appointment(s) needed: n/a  Additonal Notes: n/a

## 2024-07-03 ENCOUNTER — TELEPHONE (OUTPATIENT)
Dept: PULMONOLOGY | Facility: CLINIC | Age: 79
End: 2024-07-03
Payer: COMMERCIAL

## 2024-07-03 NOTE — TELEPHONE ENCOUNTER
Left Voicemail (1st Attempt) for the patient to call back and schedule the following:    Appointment type:  rtn ep   Provider: jacky   Return date: 08/29/24  Specialty phone number: [phone] opt 1   Additional appointment(s) needed: n/a   Additonal Notes:PLEASE RESCEDULE WITH JONO OR ANOTHER EP PROVIDER

## 2024-07-05 NOTE — TELEPHONE ENCOUNTER
Left Voicemail (2nd Attempt) for the patient to call back and schedule the following:    Appointment type: Return EP  Provider: Brayden Cage (7/17) or Cody (7/16)  Return date: 7/17/2024 or 7/16/2024 schedule manually   Specialty phone number: [phone] opt 1  Additional appointment(s) needed: n/a  Additonal Notes: n/a

## 2024-07-29 ENCOUNTER — NURSE TRIAGE (OUTPATIENT)
Dept: CARDIOLOGY | Facility: CLINIC | Age: 79
End: 2024-07-29

## 2024-07-29 ENCOUNTER — TELEPHONE (OUTPATIENT)
Dept: CARDIOLOGY | Facility: CLINIC | Age: 79
End: 2024-07-29
Payer: COMMERCIAL

## 2024-07-29 NOTE — TELEPHONE ENCOUNTER
7/30/2024 10:43AM Jennie Pereyra  Patient's , Daren confirmed scheduled appointment:  Date: 7/31/2024  Time: 3:30PM  Visit type: New Cardiology  Provider: Dr. Madrigal  Location: [address], [place] [address]  Testing/imaging: NA  Additional notes: 7/30 Scheduled New Cardiology w/ Dr. Madrigal 7/31. OLGA Pereyra 7/30/2024 10:43AM        7/30/2024 9:52AM Jennie Pereyra  Left Voicemail (1st Attempt), Left Voicemail with Family Member (1st Attempt), and Sent Mychart (1st Attempt) for the patient to call back and schedule the following:    Appointment type: Return EP  Provider: FAITH Sams CNP  Return date: 8/8/2024 at 4:15PM (spot is on hold- ok per Jeffrey Aptera)  Specialty phone number: [phone] option 1  Additional appointment(s) needed: NA  Additonal Notes: 7/30 LVM and MYC to offer Return EP w/ Moira Blackman 8/8 at 4:15PM. [facility] is on hold. Ok per Jeffrey Proehl. OLGA Pereyra 7/30/2024 9:52AM        7/29/2024 3:52PM Jennie Pereyra  Patient Contacted Pt's spouse, Daren contacted for the patient to call back and schedule the following:    Appointment type: New Cardiology  Provider: Any gen card MD  Return date: Next available  Specialty phone number: [phone] option 1  Additional appointment(s) needed: NA  Additonal Notes: 7/29 Called pt's  to schedule New Cardiology next available. Offered next available 9/4 New Cardiology w/ Dr. Pike in [place], but pt's  did not want to wait that long, even being offered the waitlist. Pt's  hung up. Added pt to high priority waitlist for [facility], [facility], and [place]. OLGA Pereyra 7/29/2024 3:52PM    Applied

## 2024-07-29 NOTE — TELEPHONE ENCOUNTER
"Received a call from the [facility] to discuss chest pain.  Patient has an appointment 10/31/24 with Dr. Pipo Pike.    HX: permanent A fib, unspecified chest pain, RBBB, hypothyroidsm    Spoke to spouse Daren who is with Bobby in the background.   He says for the last 3-4 days, she has had left sided \"heart pain\" that goes to her shoulder and hand. He says she has had this same chest pain in the past.  He says the intensity waxes and wanes but is \"always there\". He rates the pain #6-7 all of the time for the last 2 days. He denies she has any palpitations, dizziness or shortness of breath. He says she is taking eliquis and metoprolol. She does not check VS at home.   Daren became upset and agitated with all of the triage questions. He raised his tone, and is demanding a sooner appointment.  Advised a message will be sent to [place] [facility] for follow up.  Please call mobile number on file.    1. LOCATION: \"Where does it hurt?\" heart  2. RADIATION: \"Does the pain go anywhere else?\" (e.g., into neck, jaw, arms, back)  3. ONSET: \"When did the chest pain begin?\" (Minutes, hours or days) left arm/hand  4. PATTERN: \"Does the pain come and go, or has it been constant since it started?\" \"Does it get worse with exertion?\" Constant for 3-4 days  5. DURATION: \"How long does it last\" (e.g., seconds, minutes, hours)  6. SEVERITY: \"How bad is the pain?\" (e.g., Scale 1-10; mild, moderate, or severe) #6-7 last 2 days. Moderate  - MILD (1-3): doesn't interfere with normal activities  - MODERATE (4-7): interferes with normal activities or awakens from sleep  - SEVERE (8-10): excruciating pain, unable to do any normal activities  7. CARDIAC RISK FACTORS: \"Do you have any history of heart problems or risk factors for heart disease?\" (e.g., angina, prior heart attack; diabetes, high blood pressure, high cholesterol, smoker, or strong family history of heart disease)  persistent A fib  8. PULMONARY RISK FACTORS: \"Do you have any history of lung " "disease?\" (e.g., blood clots in lung, asthma, emphysema, birth control pills)  9. CAUSE: \"What do you think is causing the chest pain?\"  10. OTHER SYMPTOMS: \"Do you have any other symptoms?\" (e.g., dizziness, nausea, vomiting, sweating, fever, difficulty breathing, cough) does not endorse other symptoms besides arm and hand   11. PREGNANCY: \"Is there any chance you are pregnant?\" \"When was your last menstrual period?\"  "

## 2024-07-31 ENCOUNTER — OFFICE VISIT (OUTPATIENT)
Dept: ORTHOPEDICS | Facility: CLINIC | Age: 79
End: 2024-07-31
Payer: COMMERCIAL

## 2024-07-31 ENCOUNTER — OFFICE VISIT (OUTPATIENT)
Dept: CARDIOLOGY | Facility: CLINIC | Age: 79
End: 2024-07-31
Attending: INTERNAL MEDICINE
Payer: COMMERCIAL

## 2024-07-31 ENCOUNTER — ANCILLARY PROCEDURE (OUTPATIENT)
Dept: GENERAL RADIOLOGY | Facility: CLINIC | Age: 79
End: 2024-07-31
Attending: FAMILY MEDICINE
Payer: COMMERCIAL

## 2024-07-31 VITALS
DIASTOLIC BLOOD PRESSURE: 72 MMHG | WEIGHT: 144.1 LBS | BODY MASS INDEX: 31.18 KG/M2 | OXYGEN SATURATION: 96 % | SYSTOLIC BLOOD PRESSURE: 114 MMHG | HEART RATE: 91 BPM

## 2024-07-31 DIAGNOSIS — M25.522 LEFT ELBOW PAIN: ICD-10-CM

## 2024-07-31 DIAGNOSIS — M77.02 MEDIAL EPICONDYLITIS OF ELBOW, LEFT: Primary | ICD-10-CM

## 2024-07-31 DIAGNOSIS — I48.20 CHRONIC ATRIAL FIBRILLATION (H): Primary | ICD-10-CM

## 2024-07-31 DIAGNOSIS — R07.9 ACUTE CHEST PAIN: ICD-10-CM

## 2024-07-31 DIAGNOSIS — M25.522 LEFT ELBOW PAIN: Primary | ICD-10-CM

## 2024-07-31 PROCEDURE — 73080 X-RAY EXAM OF ELBOW: CPT | Mod: LT | Performed by: RADIOLOGY

## 2024-07-31 PROCEDURE — 93005 ELECTROCARDIOGRAM TRACING: CPT

## 2024-07-31 PROCEDURE — 99214 OFFICE O/P EST MOD 30 MIN: CPT | Performed by: FAMILY MEDICINE

## 2024-07-31 PROCEDURE — G0463 HOSPITAL OUTPT CLINIC VISIT: HCPCS | Performed by: INTERNAL MEDICINE

## 2024-07-31 PROCEDURE — 93010 ELECTROCARDIOGRAM REPORT: CPT | Mod: GC | Performed by: INTERNAL MEDICINE

## 2024-07-31 PROCEDURE — 99213 OFFICE O/P EST LOW 20 MIN: CPT | Performed by: INTERNAL MEDICINE

## 2024-07-31 RX ORDER — PANTOPRAZOLE SODIUM 20 MG/1
1 TABLET, DELAYED RELEASE ORAL DAILY
COMMUNITY
Start: 2024-05-03

## 2024-07-31 ASSESSMENT — PAIN SCALES - GENERAL: PAINLEVEL: MODERATE PAIN (5)

## 2024-07-31 NOTE — LETTER
7/31/2024      RE: Bobby Ignacio  [address] [address]     Dear Colleague,    Thank you for referring your patient, Bobby Ignacio, to the [facility] [place]. Please see a copy of my visit note below.    CHIEF COMPLAINT:  No chief complaint on file.       HISTORY OF PRESENT ILLNESS  Ms. Ignacio is a 79 year old female who presents to clinic today with left elbow pain.  Bobby reports pain for for about 1 month without an injury. Pain is intermittent and located medial elbow. She reports pain with pushing off chairs. She has been taking tylenol for the pain. She denies any numbness and tingling.         Additional history: as documented    MEDICAL HISTORY  Patient Active Problem List   Diagnosis     Cataract     PVD (posterior vitreous detachment)     Hypothyroidism     Anxiety and depression     Chest pain     Fatigue     Varicose veins of lower extremity     Status post total left knee replacement     Sensorineural hearing loss of both ears     Prediabetes     Osteopenia     Neck pain, chronic     Chronic abdominal pain     Nausea     Insomnia     IFG (impaired fasting glucose)     Hyperlipemia     FH: diabetes mellitus     Esophageal reflux     DJD (degenerative joint disease)     Cramps of lower extremity     Constipation     Burning sensation of feet     Diabetes mellitus, type 2 (H)     Chronic atrial fibrillation (H)     Left elbow pain       Current Outpatient Medications   Medication Sig Dispense Refill     acetaminophen (TYLENOL) 500 MG tablet Take 1 tablet (500 mg) by mouth 2 times daily as needed for mild pain or pain 30 tablet 0     apixaban ANTICOAGULANT (ELIQUIS) 5 MG tablet Take 1 tablet (5 mg) by mouth 2 times daily 180 tablet 3     bisacodyl (DULCOLAX) 5 MG EC tablet Take 2 tablets at 3 pm the day before your procedure. If your procedure is before 11 am, take 2 additional tablets at 11 pm. If your procedure is after 11 am, take 2  additional tablets at 6 am. For additional instructions refer to your colonoscopy prep instructions. 4 tablet 0     Cholecalciferol (VITAMIN D) 2000 UNITS CAPS Take 2,000 Units by mouth daily       ketotifen (ZADITOR) 0.025 % ophthalmic solution Place 1 drop into both eyes 2 times daily 10 mL 3     levothyroxine (SYNTHROID/LEVOTHROID) 125 MCG tablet Take 125 mcg by mouth daily       metoprolol succinate ER (TOPROL-XL) 25 MG 24 hr tablet Take 1 tablet (25 mg) by mouth At Bedtime 30 tablet 4     olopatadine (PATADAY) 0.2 % ophthalmic solution Place 0.05 mLs (1 drop) into both eyes daily 2.5 mL 11     pantoprazole (PROTONIX) 20 MG EC tablet Take 1 tablet by mouth daily       sertraline (ZOLOFT) 100 MG tablet Take 100 mg by mouth daily         Allergies   Allergen Reactions     Aspirin      GI UPSET  States she can tolerate single doses of enteric coated      Aspirin-Butalbital-Caffeine [Butalbital-Aspirin-Caffeine]      sensatitive stomach     Codeine      UPSET STOMACHE     Gabapentin Other (See Comments)     Depression       Family History   Problem Relation Age of Onset     Cardiovascular Father         CHF     C.A.D. Father      Cardiovascular Brother         CHF     Coronary Artery Disease Brother      C.A.D. Brother      Cardiovascular Brother      Cardiovascular Brother      No Known Problems Son      No Known Problems Son      No Known Problems Daughter      Glaucoma No family hx of      Macular Degeneration No family hx of        Additional medical/Social/Surgical histories reviewed in EPIC and updated as appropriate.        PHYSICAL EXAM  Musculoskeletal - left elbow  - inspection: normal joint alignment, no obvious deformity, mild soft tissue swelling medially  - palpation: tender at the origin of the common flexor tendon  - ROM:  160 deg flexion   0 deg extension   90 deg pronation   90 deg supination  - strength: 5/5 wrist flexion with elbow flexed, 4/5 with elbow extended, painful resisted flexion of  fingers with elbow flexed, worse with extension, 5/5  strength  Neuro  - no sensory or motor deficit, grossly normal coordination, normal muscle tone           ASSESSMENT & PLAN  Ms. Ignacio is a 79 year old female who presents to clinic today with left elbow pain.    I ordered and independently reviewed an xray of her left elbow, this reveals no obvious acute issues.    Her symptoms are consistent with medial epicondylitis.  We described the pathophysiology of this, we also discussed treatment strategies.  I did provide her a wrist brace today, she should wear this at all times that she is able, including sleep.  I am also recommending diclofenac gel.  If she does well with these measures we could follow-up as needed, although if she is experiencing ongoing pain despite this we could consider hand therapy or an MRI.    It was a pleasure seeing Bobby today.    Pierre Jarrett DO, [facility]  Primary Care Sports Medicine      This note was constructed using Dragon dictation software, please excuse any minor errors in spelling, grammar, or syntax.      Again, thank you for allowing me to participate in the care of your patient.      Sincerely,    Pierre Jarrett DO

## 2024-07-31 NOTE — PROGRESS NOTES
I am delighted to see Bobby Ignacio in consultation.The primary encounter diagnosis was Chronic atrial fibrillation (H). A diagnosis of Left elbow pain was also pertinent to this visit.   As you know, the patient is a 79 year old [place] female. She   has a past medical history of Atrial fibrillation (H), Chest pain (04/01/2014), Depression, Fatigue, Gastritis, Hyperlipidemia, and Unspecified hypothyroidism..    On this visit, the patient states that she has left elbow pain that is worse with using her arm. She does not describe chest pain at rest or with exercise.  She has baseline dyspnea on exertion that is unchanged.  The patient denies chest pressure/discomfort, palpitations, near-syncope, syncope, orthopnea, paroxysmal nocturnal dyspnea, and lower extermity edema.    The patient's cardiovascular risk factors include arrhythmia and high cholesterol.    The following portions of the patient's history were reviewed and updated as appropriate: allergies, current medications, past family history, past medical history, past social history, past surgical history, and the problem list.    PMH: The patient's past medical history includes:    Past Medical History:   Diagnosis Date    Atrial fibrillation (H)     Chest pain 04/01/2014    Depression     Fatigue     Gastritis     Hyperlipidemia     Unspecified hypothyroidism     Hypothyroidism      Past Surgical History:   Procedure Laterality Date    CHOLECYSTECTOMY, OPEN         The patient's medications as of the current encounter are:     Current Outpatient Medications   Medication Sig Dispense Refill    acetaminophen (TYLENOL) 500 MG tablet Take 1 tablet (500 mg) by mouth 2 times daily as needed for mild pain or pain 30 tablet 0    apixaban ANTICOAGULANT (ELIQUIS) 5 MG tablet Take 1 tablet (5 mg) by mouth 2 times daily 180 tablet 3    bisacodyl (DULCOLAX) 5 MG EC tablet Take 2 tablets at 3 pm the day before your procedure. If your procedure is before 11 am, take 2  additional tablets at 11 pm. If your procedure is after 11 am, take 2 additional tablets at 6 am. For additional instructions refer to your colonoscopy prep instructions. 4 tablet 0    Cholecalciferol (VITAMIN D) 2000 UNITS CAPS Take 2,000 Units by mouth daily      ketotifen (ZADITOR) 0.025 % ophthalmic solution Place 1 drop into both eyes 2 times daily 10 mL 3    levothyroxine (SYNTHROID/LEVOTHROID) 125 MCG tablet Take 125 mcg by mouth daily      metoprolol succinate ER (TOPROL-XL) 25 MG 24 hr tablet Take 1 tablet (25 mg) by mouth At Bedtime 30 tablet 4    olopatadine (PATADAY) 0.2 % ophthalmic solution Place 0.05 mLs (1 drop) into both eyes daily 2.5 mL 11    pantoprazole (PROTONIX) 20 MG EC tablet Take 1 tablet by mouth daily      sertraline (ZOLOFT) 100 MG tablet Take 100 mg by mouth daily         Labs:     Admission on 09/26/2023, Discharged on 09/26/2023   Component Date Value Ref Range Status    Ventricular Rate 09/26/2023 85  BPM Final    Atrial Rate 09/26/2023 85  BPM Final    QRS Duration 09/26/2023 134  ms Final    QT 09/26/2023 372  ms Final    QTc 09/26/2023 442  ms Final    R AXIS 09/26/2023 -12  degrees Final    T Axis 09/26/2023 107  degrees Final    Interpretation ECG 09/26/2023    Final                    Value:Atrial fibrillation  Right bundle branch block  Abnormal ECG  Unconfirmed report - interpretation of this ECG is computer generated - see medical record for final interpretation  Confirmed by - EMERGENCY ROOM, PHYSICIAN ([id number]),  TAE HUYNH ([id number]) on 9/26/2023 6:08:16 PM      WBC Count 09/26/2023 9.1  4.0 - 11.0 10e3/uL Final    RBC Count 09/26/2023 4.86  3.80 - 5.20 10e6/uL Final    Hemoglobin 09/26/2023 14.3  11.7 - 15.7 g/dL Final    Hematocrit 09/26/2023 43.1  35.0 - 47.0 % Final    MCV 09/26/2023 89  78 - 100 fL Final    MCH 09/26/2023 29.4  26.5 - 33.0 pg Final    MCHC 09/26/2023 33.2  31.5 - 36.5 g/dL Final    RDW 09/26/2023 13.0  10.0 - 15.0 % Final    Platelet Count  09/26/2023 171  150 - 450 10e3/uL Final    Sodium 09/26/2023 138  135 - 145 mmol/L Final    Reference intervals for this test were updated on 09/26/2023 to more accurately reflect our healthy population. There may be differences in the flagging of prior results with similar values performed with this method. Interpretation of those prior results can be made in the context of the updated reference intervals.     Potassium 09/26/2023 4.1  3.4 - 5.3 mmol/L Final    Carbon Dioxide (CO2) 09/26/2023 23  22 - 29 mmol/L Final    Anion Gap 09/26/2023 13  7 - 15 mmol/L Final    Urea Nitrogen 09/26/2023 17.9  8.0 - 23.0 mg/dL Final    Creatinine 09/26/2023 0.70  0.51 - 0.95 mg/dL Final    GFR Estimate 09/26/2023 88  >60 mL/min/1.73m2 Final    Calcium 09/26/2023 10.0  8.8 - 10.2 mg/dL Final    Chloride 09/26/2023 102  98 - 107 mmol/L Final    Glucose 09/26/2023 99  70 - 99 mg/dL Final    Alkaline Phosphatase 09/26/2023 112 (H)  35 - 104 U/L Final    AST 09/26/2023 23  0 - 45 U/L Final    Reference intervals for this test were updated on 6/12/2023 to more accurately reflect our healthy population. There may be differences in the flagging of prior results with similar values performed with this method. Interpretation of those prior results can be made in the context of the updated reference intervals.    ALT 09/26/2023 13  0 - 50 U/L Final    Reference intervals for this test were updated on 6/12/2023 to more accurately reflect our healthy population. There may be differences in the flagging of prior results with similar values performed with this method. Interpretation of those prior results can be made in the context of the updated reference intervals.      Protein Total 09/26/2023 8.0  6.4 - 8.3 g/dL Final    Albumin 09/26/2023 4.6  3.5 - 5.2 g/dL Final    Bilirubin Total 09/26/2023 0.7  <=1.2 mg/dL Final    Troponin T, High Sensitivity 09/26/2023 6  <=14 ng/L Final    Either a High Sensitivity Troponin T baseline (0 hours)  value = 100 ng/L, or an increase in High Sensitivity Troponin T = 7 ng/L at 2 hours compared to 0 hours (2-0 hours), suggests myocardial injury, and urgent clinical attention is required.    If the 2-0 hours increase is <7 ng/L, a High Sensitivity Troponin T result above gender-specific reference ranges warrants further evaluation.   Recommendations for further evaluation include correlation with clinical decision-making tool (e.g., HEART), a 3rd High Sensitivity Troponin T test 2 hours after the 2nd (a 20% change from baseline would represent concern), admission for observation, close PCC/cardiology follow-up, or urgent outpatient provocative testing.    Calcium 09/26/2023 10.0  8.8 - 10.2 mg/dL Final    Calcium Ionized Whole Blood 09/26/2023 5.0  4.4 - 5.2 mg/dL Final       Allergies:    Allergies   Allergen Reactions    Aspirin      GI UPSET  States she can tolerate single doses of enteric coated     Aspirin-Butalbital-Caffeine [Butalbital-Aspirin-Caffeine]      sensatitive stomach    Codeine      UPSET STOMACHE    Gabapentin Other (See Comments)     Depression       Family History:   Family History   Problem Relation Age of Onset    Cardiovascular Father         CHF    C.A.D. Father     Cardiovascular Brother         CHF    Coronary Artery Disease Brother     C.A.D. Brother     Cardiovascular Brother     Cardiovascular Brother     No Known Problems Son     No Known Problems Son     No Known Problems Daughter     Glaucoma No family hx of     Macular Degeneration No family hx of        Psychosocial history:  reports that she has never smoked. She has never used smokeless tobacco. She reports that she does not drink alcohol and does not use drugs.    Review of systems: negative for, palpitations, exertional chest pain or pressure, paroxysmal nocturnal dyspnea, dyspnea on exertion, orthopnea, lower extremity edema, and syncope or near-syncope    In addition,   General: No change in weight, sleep or appetite.  Normal  energy.  No fever or chills  Eyes: Negative for vision changes or eye problems  ENT: No problems with ears, nose or throat.  No difficulty swallowing.  Resp: No coughing, wheezing or shortness of breath  GI: No nausea, vomiting,  heartburn, abdominal pain, diarrhea, constipation or change in bowel habits  : No urinary frequency or dysuria, bladder or kidney problems  Musculoskeletal: left elbow pain  Neurologic: No headaches, numbness, tingling, weakness, problems with balance or coordination  Psychiatric: stable anxiety/depression  Heme/immune/allergy: No history of bleeding or clotting problems or anemia.    Endocrine: Hypothyroid. She denies DM  Skin: No rashes,worrisome lesions or skin problems  Vascular:  No claudication, lifestyle limiting or otherwise; no ischemic rest pain; no non-healing ulcers. No weakness, No loss of sensation        Physical examination  Vitals: /72 (BP Location: Right arm, Patient Position: Chair, Cuff Size: Adult Regular)   Pulse 91   Wt 65.4 kg (144 lb 1.6 oz)   SpO2 96%   BMI 31.18 kg/m    BMI= Body mass index is 31.18 kg/m .    In general, the patient is a pleasant female in no apparent distress.    HEENT: Normiocephalic and atraumatic.  PERRLA.  EOMI.  Sclerae white, not injected.  Nares clear.  Pharynx without erythema or exudate.  Dentition intact.    Neck: No adenopathy.  No thyromegaly. Carotids +2/2 bilaterally without bruits.  No jugular venous distension.   Heart:  The PMI is in the 5th ICS in the midclavicular line. There is no heave. Regular rate and rhythm. Normal S1, S2 splits physiologically. No murmur, rub, click, or gallop.    Lungs: Clear to asculation.  No ronchi, wheezes, rales.  No dullness to percussion.   Abdomen: Soft, nontender, nondistended. No organomegaly. No AAA.  No bruits.   Extremities: No clubbing, cyanosis, or edema. The pulses were intact bilaterally.  There is some tenderness over bursa of left elbow.  No point tenderness or  displacement.  Neurological: The neurological examination reveal a patient who was oriented to person, place, and time.  The remainder of the examination was nonfocal.    Cardiac tests include:    ECG AF HR 82 bpm, RBBB, no significant change from previous    Assessment and Plan    Chronic AF - on beta blocker, DOAC  Left arm pain - unlikely cardiac  - check ECG  - left elbow Xray  - refer to ortho  - tylenol prn    The patient is to return  prn. The patient understood the treatment plan as outlined above.  Patient  accompanied and translated.  Translation offered.      Edwin Madrigal MD

## 2024-07-31 NOTE — PROGRESS NOTES
CHIEF COMPLAINT:  No chief complaint on file.       HISTORY OF PRESENT ILLNESS  Ms. Ignacio is a 79 year old female who presents to clinic today with left elbow pain.  Bobby reports pain for for about 1 month without an injury. Pain is intermittent and located medial elbow. She reports pain with pushing off chairs. She has been taking tylenol for the pain. She denies any numbness and tingling.         Additional history: as documented    MEDICAL HISTORY  Patient Active Problem List   Diagnosis    Cataract    PVD (posterior vitreous detachment)    Hypothyroidism    Anxiety and depression    Chest pain    Fatigue    Varicose veins of lower extremity    Status post total left knee replacement    Sensorineural hearing loss of both ears    Prediabetes    Osteopenia    Neck pain, chronic    Chronic abdominal pain    Nausea    Insomnia    IFG (impaired fasting glucose)    Hyperlipemia    FH: diabetes mellitus    Esophageal reflux    DJD (degenerative joint disease)    Cramps of lower extremity    Constipation    Burning sensation of feet    Diabetes mellitus, type 2 (H)    Chronic atrial fibrillation (H)    Left elbow pain       Current Outpatient Medications   Medication Sig Dispense Refill    acetaminophen (TYLENOL) 500 MG tablet Take 1 tablet (500 mg) by mouth 2 times daily as needed for mild pain or pain 30 tablet 0    apixaban ANTICOAGULANT (ELIQUIS) 5 MG tablet Take 1 tablet (5 mg) by mouth 2 times daily 180 tablet 3    bisacodyl (DULCOLAX) 5 MG EC tablet Take 2 tablets at 3 pm the day before your procedure. If your procedure is before 11 am, take 2 additional tablets at 11 pm. If your procedure is after 11 am, take 2 additional tablets at 6 am. For additional instructions refer to your colonoscopy prep instructions. 4 tablet 0    Cholecalciferol (VITAMIN D) 2000 UNITS CAPS Take 2,000 Units by mouth daily      ketotifen (ZADITOR) 0.025 % ophthalmic solution Place 1 drop into both eyes 2 times daily 10 mL 3     levothyroxine (SYNTHROID/LEVOTHROID) 125 MCG tablet Take 125 mcg by mouth daily      metoprolol succinate ER (TOPROL-XL) 25 MG 24 hr tablet Take 1 tablet (25 mg) by mouth At Bedtime 30 tablet 4    olopatadine (PATADAY) 0.2 % ophthalmic solution Place 0.05 mLs (1 drop) into both eyes daily 2.5 mL 11    pantoprazole (PROTONIX) 20 MG EC tablet Take 1 tablet by mouth daily      sertraline (ZOLOFT) 100 MG tablet Take 100 mg by mouth daily         Allergies   Allergen Reactions    Aspirin      GI UPSET  States she can tolerate single doses of enteric coated     Aspirin-Butalbital-Caffeine [Butalbital-Aspirin-Caffeine]      sensatitive stomach    Codeine      UPSET STOMACHE    Gabapentin Other (See Comments)     Depression       Family History   Problem Relation Age of Onset    Cardiovascular Father         CHF    C.A.D. Father     Cardiovascular Brother         CHF    Coronary Artery Disease Brother     C.A.D. Brother     Cardiovascular Brother     Cardiovascular Brother     No Known Problems Son     No Known Problems Son     No Known Problems Daughter     Glaucoma No family hx of     Macular Degeneration No family hx of        Additional medical/Social/Surgical histories reviewed in EPIC and updated as appropriate.        PHYSICAL EXAM  Musculoskeletal - left elbow  - inspection: normal joint alignment, no obvious deformity, mild soft tissue swelling medially  - palpation: tender at the origin of the common flexor tendon  - ROM:  160 deg flexion   0 deg extension   90 deg pronation   90 deg supination  - strength: 5/5 wrist flexion with elbow flexed, 4/5 with elbow extended, painful resisted flexion of fingers with elbow flexed, worse with extension, 5/5  strength  Neuro  - no sensory or motor deficit, grossly normal coordination, normal muscle tone           ASSESSMENT & PLAN  Ms. Ignacio is a 79 year old female who presents to clinic today with left elbow pain.    I ordered and independently reviewed an xray of her  left elbow, this reveals no obvious acute issues.    Her symptoms are consistent with medial epicondylitis.  We described the pathophysiology of this, we also discussed treatment strategies.  I did provide her a wrist brace today, she should wear this at all times that she is able, including sleep.  I am also recommending diclofenac gel.  If she does well with these measures we could follow-up as needed, although if she is experiencing ongoing pain despite this we could consider hand therapy or an MRI.    It was a pleasure seeing Bobby today.    Pierre Jarrett DO, [facility]  Primary Care Sports Medicine      This note was constructed using Dragon dictation software, please excuse any minor errors in spelling, grammar, or syntax.

## 2024-07-31 NOTE — LETTER
7/31/2024      RE: Bobby Ignacio  [address] [facility] [id number]       Dear Colleague,    Thank you for the opportunity to participate in the care of your patient, Bobby Ignacio, at the [facility] [place] at [facility]. Please see a copy of my visit note below.    I am delighted to see Bobby Ignacio in consultation.The primary encounter diagnosis was Chronic atrial fibrillation (H). A diagnosis of Left elbow pain was also pertinent to this visit.   As you know, the patient is a 79 year old [place] female. She   has a past medical history of Atrial fibrillation (H), Chest pain (04/01/2014), Depression, Fatigue, Gastritis, Hyperlipidemia, and Unspecified hypothyroidism..    On this visit, the patient states that she has left elbow pain that is worse with using her arm. She does not describe chest pain at rest or with exercise.  She has baseline dyspnea on exertion that is unchanged.  The patient denies chest pressure/discomfort, palpitations, near-syncope, syncope, orthopnea, paroxysmal nocturnal dyspnea, and lower extermity edema.    The patient's cardiovascular risk factors include arrhythmia and high cholesterol.    The following portions of the patient's history were reviewed and updated as appropriate: allergies, current medications, past family history, past medical history, past social history, past surgical history, and the problem list.    PMH: The patient's past medical history includes:    Past Medical History:   Diagnosis Date     Atrial fibrillation (H)      Chest pain 04/01/2014     Depression      Fatigue      Gastritis      Hyperlipidemia      Unspecified hypothyroidism     Hypothyroidism      Past Surgical History:   Procedure Laterality Date     CHOLECYSTECTOMY, OPEN         The patient's medications as of the current encounter are:     Current Outpatient Medications   Medication Sig Dispense Refill     acetaminophen  (TYLENOL) 500 MG tablet Take 1 tablet (500 mg) by mouth 2 times daily as needed for mild pain or pain 30 tablet 0     apixaban ANTICOAGULANT (ELIQUIS) 5 MG tablet Take 1 tablet (5 mg) by mouth 2 times daily 180 tablet 3     bisacodyl (DULCOLAX) 5 MG EC tablet Take 2 tablets at 3 pm the day before your procedure. If your procedure is before 11 am, take 2 additional tablets at 11 pm. If your procedure is after 11 am, take 2 additional tablets at 6 am. For additional instructions refer to your colonoscopy prep instructions. 4 tablet 0     Cholecalciferol (VITAMIN D) 2000 UNITS CAPS Take 2,000 Units by mouth daily       ketotifen (ZADITOR) 0.025 % ophthalmic solution Place 1 drop into both eyes 2 times daily 10 mL 3     levothyroxine (SYNTHROID/LEVOTHROID) 125 MCG tablet Take 125 mcg by mouth daily       metoprolol succinate ER (TOPROL-XL) 25 MG 24 hr tablet Take 1 tablet (25 mg) by mouth At Bedtime 30 tablet 4     olopatadine (PATADAY) 0.2 % ophthalmic solution Place 0.05 mLs (1 drop) into both eyes daily 2.5 mL 11     pantoprazole (PROTONIX) 20 MG EC tablet Take 1 tablet by mouth daily       sertraline (ZOLOFT) 100 MG tablet Take 100 mg by mouth daily         Labs:     Admission on 09/26/2023, Discharged on 09/26/2023   Component Date Value Ref Range Status     Ventricular Rate 09/26/2023 85  BPM Final     Atrial Rate 09/26/2023 85  BPM Final     QRS Duration 09/26/2023 134  ms Final     QT 09/26/2023 372  ms Final     QTc 09/26/2023 442  ms Final     R AXIS 09/26/2023 -12  degrees Final     T Axis 09/26/2023 107  degrees Final     Interpretation ECG 09/26/2023    Final                    Value:Atrial fibrillation  Right bundle branch block  Abnormal ECG  Unconfirmed report - interpretation of this ECG is computer generated - see medical record for final interpretation  Confirmed by - EMERGENCY ROOM, PHYSICIAN ([id number]),  TAE HUYNH ([id number]) on 9/26/2023 6:08:16 PM       WBC Count 09/26/2023 9.1  4.0 - 11.0  10e3/uL Final     RBC Count 09/26/2023 4.86  3.80 - 5.20 10e6/uL Final     Hemoglobin 09/26/2023 14.3  11.7 - 15.7 g/dL Final     Hematocrit 09/26/2023 43.1  35.0 - 47.0 % Final     MCV 09/26/2023 89  78 - 100 fL Final     MCH 09/26/2023 29.4  26.5 - 33.0 pg Final     MCHC 09/26/2023 33.2  31.5 - 36.5 g/dL Final     RDW 09/26/2023 13.0  10.0 - 15.0 % Final     Platelet Count 09/26/2023 171  150 - 450 10e3/uL Final     Sodium 09/26/2023 138  135 - 145 mmol/L Final    Reference intervals for this test were updated on 09/26/2023 to more accurately reflect our healthy population. There may be differences in the flagging of prior results with similar values performed with this method. Interpretation of those prior results can be made in the context of the updated reference intervals.      Potassium 09/26/2023 4.1  3.4 - 5.3 mmol/L Final     Carbon Dioxide (CO2) 09/26/2023 23  22 - 29 mmol/L Final     Anion Gap 09/26/2023 13  7 - 15 mmol/L Final     Urea Nitrogen 09/26/2023 17.9  8.0 - 23.0 mg/dL Final     Creatinine 09/26/2023 0.70  0.51 - 0.95 mg/dL Final     GFR Estimate 09/26/2023 88  >60 mL/min/1.73m2 Final     Calcium 09/26/2023 10.0  8.8 - 10.2 mg/dL Final     Chloride 09/26/2023 102  98 - 107 mmol/L Final     Glucose 09/26/2023 99  70 - 99 mg/dL Final     Alkaline Phosphatase 09/26/2023 112 (H)  35 - 104 U/L Final     AST 09/26/2023 23  0 - 45 U/L Final    Reference intervals for this test were updated on 6/12/2023 to more accurately reflect our healthy population. There may be differences in the flagging of prior results with similar values performed with this method. Interpretation of those prior results can be made in the context of the updated reference intervals.     ALT 09/26/2023 13  0 - 50 U/L Final    Reference intervals for this test were updated on 6/12/2023 to more accurately reflect our healthy population. There may be differences in the flagging of prior results with similar values performed with  this method. Interpretation of those prior results can be made in the context of the updated reference intervals.       Protein Total 09/26/2023 8.0  6.4 - 8.3 g/dL Final     Albumin 09/26/2023 4.6  3.5 - 5.2 g/dL Final     Bilirubin Total 09/26/2023 0.7  <=1.2 mg/dL Final     Troponin T, High Sensitivity 09/26/2023 6  <=14 ng/L Final    Either a High Sensitivity Troponin T baseline (0 hours) value = 100 ng/L, or an increase in High Sensitivity Troponin T = 7 ng/L at 2 hours compared to 0 hours (2-0 hours), suggests myocardial injury, and urgent clinical attention is required.    If the 2-0 hours increase is <7 ng/L, a High Sensitivity Troponin T result above gender-specific reference ranges warrants further evaluation.   Recommendations for further evaluation include correlation with clinical decision-making tool (e.g., HEART), a 3rd High Sensitivity Troponin T test 2 hours after the 2nd (a 20% change from baseline would represent concern), admission for observation, close PCC/cardiology follow-up, or urgent outpatient provocative testing.     Calcium 09/26/2023 10.0  8.8 - 10.2 mg/dL Final     Calcium Ionized Whole Blood 09/26/2023 5.0  4.4 - 5.2 mg/dL Final       Allergies:    Allergies   Allergen Reactions     Aspirin      GI UPSET  States she can tolerate single doses of enteric coated      Aspirin-Butalbital-Caffeine [Butalbital-Aspirin-Caffeine]      sensatitive stomach     Codeine      UPSET STOMACHE     Gabapentin Other (See Comments)     Depression       Family History:   Family History   Problem Relation Age of Onset     Cardiovascular Father         CHF     C.A.D. Father      Cardiovascular Brother         CHF     Coronary Artery Disease Brother      C.A.D. Brother      Cardiovascular Brother      Cardiovascular Brother      No Known Problems Son      No Known Problems Son      No Known Problems Daughter      Glaucoma No family hx of      Macular Degeneration No family hx of        Psychosocial history:   reports that she has never smoked. She has never used smokeless tobacco. She reports that she does not drink alcohol and does not use drugs.    Review of systems: negative for, palpitations, exertional chest pain or pressure, paroxysmal nocturnal dyspnea, dyspnea on exertion, orthopnea, lower extremity edema, and syncope or near-syncope    In addition,   General: No change in weight, sleep or appetite.  Normal energy.  No fever or chills  Eyes: Negative for vision changes or eye problems  ENT: No problems with ears, nose or throat.  No difficulty swallowing.  Resp: No coughing, wheezing or shortness of breath  GI: No nausea, vomiting,  heartburn, abdominal pain, diarrhea, constipation or change in bowel habits  : No urinary frequency or dysuria, bladder or kidney problems  Musculoskeletal: left elbow pain  Neurologic: No headaches, numbness, tingling, weakness, problems with balance or coordination  Psychiatric: stable anxiety/depression  Heme/immune/allergy: No history of bleeding or clotting problems or anemia.    Endocrine: Hypothyroid. She denies DM  Skin: No rashes,worrisome lesions or skin problems  Vascular:  No claudication, lifestyle limiting or otherwise; no ischemic rest pain; no non-healing ulcers. No weakness, No loss of sensation        Physical examination  Vitals: /72 (BP Location: Right arm, Patient Position: Chair, Cuff Size: Adult Regular)   Pulse 91   Wt 65.4 kg (144 lb 1.6 oz)   SpO2 96%   BMI 31.18 kg/m    BMI= Body mass index is 31.18 kg/m .    In general, the patient is a pleasant female in no apparent distress.    HEENT: Normiocephalic and atraumatic.  PERRLA.  EOMI.  Sclerae white, not injected.  Nares clear.  Pharynx without erythema or exudate.  Dentition intact.    Neck: No adenopathy.  No thyromegaly. Carotids +2/2 bilaterally without bruits.  No jugular venous distension.   Heart:  The PMI is in the 5th ICS in the midclavicular line. There is no heave. Regular rate and  rhythm. Normal S1, S2 splits physiologically. No murmur, rub, click, or gallop.    Lungs: Clear to asculation.  No ronchi, wheezes, rales.  No dullness to percussion.   Abdomen: Soft, nontender, nondistended. No organomegaly. No AAA.  No bruits.   Extremities: No clubbing, cyanosis, or edema. The pulses were intact bilaterally.  There is some tenderness over bursa of left elbow.  No point tenderness or displacement.  Neurological: The neurological examination reveal a patient who was oriented to person, place, and time.  The remainder of the examination was nonfocal.    Cardiac tests include:    ECG AF HR 82 bpm, RBBB, no significant change from previous    Assessment and Plan    Chronic AF - on beta blocker, DOAC  Left arm pain - unlikely cardiac  - check ECG  - left elbow Xray  - refer to ortho  - tylenol prn    The patient is to return  prn. The patient understood the treatment plan as outlined above.  Patient  accompanied and translated.  Translation offered.      Edwin Madrigal MD     Please do not hesitate to contact me if you have any questions/concerns.     Sincerely,     Edwin Madrigal MD

## 2024-07-31 NOTE — NURSING NOTE
Chief Complaint   Patient presents with    New Patient     VISIT TYPE : new - see 7-29-24 triage encounter    Pt c/o left arm pain, doesn't describe as shooting pain, more of an aching discomfort, sometimes painful to the touch. Patient reports itching and GI issues for many years, pt is not sure for how long exactly. Patient also talked about difficulties sleeping due to pain and burning feeling in legs and feet every single night, reports lesser pain during the day.         Vitals were taken and medications reconciled.    Orion Kurtz, EMT  3:40 PM

## 2024-08-02 LAB
ATRIAL RATE - MUSE: 80 BPM
DIASTOLIC BLOOD PRESSURE - MUSE: NORMAL MMHG
INTERPRETATION ECG - MUSE: NORMAL
P AXIS - MUSE: NORMAL DEGREES
PR INTERVAL - MUSE: NORMAL MS
QRS DURATION - MUSE: 136 MS
QT - MUSE: 386 MS
QTC - MUSE: 450 MS
R AXIS - MUSE: -8 DEGREES
SYSTOLIC BLOOD PRESSURE - MUSE: NORMAL MMHG
T AXIS - MUSE: -14 DEGREES
VENTRICULAR RATE- MUSE: 82 BPM

## 2024-08-11 ENCOUNTER — HEALTH MAINTENANCE LETTER (OUTPATIENT)
Age: 79
End: 2024-08-11

## 2024-08-16 ENCOUNTER — DOCUMENTATION ONLY (OUTPATIENT)
Dept: ORTHOPEDICS | Facility: CLINIC | Age: 79
End: 2024-08-16
Payer: COMMERCIAL

## 2024-08-16 DIAGNOSIS — M77.02 MEDIAL EPICONDYLITIS OF LEFT ELBOW: Primary | ICD-10-CM

## 2024-12-22 ENCOUNTER — HEALTH MAINTENANCE LETTER (OUTPATIENT)
Age: 79
End: 2024-12-22

## 2025-01-15 DIAGNOSIS — R69 DIAGNOSIS UNKNOWN: Primary | ICD-10-CM

## 2025-01-23 ENCOUNTER — TELEPHONE (OUTPATIENT)
Dept: CARDIOLOGY | Facility: CLINIC | Age: 80
End: 2025-01-23
Payer: COMMERCIAL

## 2025-01-23 NOTE — TELEPHONE ENCOUNTER
Health Call Center    Phone Message    May a detailed message be left on voicemail: yes    Reason for Call: Family called requesting to speak with a member of the patients team in regards to her blood thinner and if she should be taking it. Please call back to further discuss.    Thank you!  [facility]

## 2025-01-23 NOTE — TELEPHONE ENCOUNTER
Karin Shelley is listed by call center as person calling, but I spoke to her twice and she states she never called us, and I did find her in our system but with no relevant cardiology history or future appts.

## 2025-01-24 NOTE — TELEPHONE ENCOUNTER
Cleve : medication has been consistently prescribed by Sandeep Julian at [facility], last filled 9-12-24 and new RX sent 12-4-24 which has not been picked up.  Relayed this information to patient, they need to contact prescriber regarding questions about the Eliquis. Expressed understanding and agreement.

## 2025-02-06 ENCOUNTER — TELEPHONE (OUTPATIENT)
Dept: UROLOGY | Facility: CLINIC | Age: 80
End: 2025-02-06

## 2025-02-06 NOTE — TELEPHONE ENCOUNTER
[facility] Call Center    Phone Message    May a detailed message be left on voicemail: yes     Reason for Call: Other: Patient's spouse, Daren, called as they would like to have patient see a Gastro MD doctor - not a PA.  Please follow up with Daren.     Action Taken: Message routed to:  [facility]    Travel Screening: Not Applicable

## 2025-02-11 ENCOUNTER — TELEPHONE (OUTPATIENT)
Dept: GASTROENTEROLOGY | Facility: CLINIC | Age: 80
End: 2025-02-11

## 2025-02-11 NOTE — TELEPHONE ENCOUNTER
Left Voicemail (2nd Attempt) for the patient to call back and schedule the following:    Appointment type: MD   Provider: GEN VIRGINIA PAYTON   Return date: next available   Specialty phone number: [phone]  Additional appointment(s) needed:   Additonal Notes:

## 2025-02-27 ENCOUNTER — TELEPHONE (OUTPATIENT)
Dept: GASTROENTEROLOGY | Facility: CLINIC | Age: 80
End: 2025-02-27
Payer: COMMERCIAL

## 2025-02-27 NOTE — TELEPHONE ENCOUNTER
Left Voicemail (1st Attempt) for the patient to call back and schedule the following:    Appointment type: return   Provider: Chris or Garima   Return date: next available   Specialty phone number: [phone]  Additional appointment(s) needed:   Additonal Notes:       * Hello,   Patient has been seen by GI in 5/2022, by GI FELLOW -  Please schedule with general GI in a return slot. Patient is requesting a fellow or MD, not a PA.   Please reach out with any other questions or concerns.   Thanks,   Krista IBRAHIM RN   [facility]   Gastroenterology    [phone]

## 2025-03-23 ENCOUNTER — HEALTH MAINTENANCE LETTER (OUTPATIENT)
Age: 80
End: 2025-03-23

## 2025-07-06 ENCOUNTER — HEALTH MAINTENANCE LETTER (OUTPATIENT)
Age: 80
End: 2025-07-06